# Patient Record
Sex: FEMALE | Race: WHITE | NOT HISPANIC OR LATINO
[De-identification: names, ages, dates, MRNs, and addresses within clinical notes are randomized per-mention and may not be internally consistent; named-entity substitution may affect disease eponyms.]

---

## 2021-11-01 ENCOUNTER — TRANSCRIPTION ENCOUNTER (OUTPATIENT)
Age: 31
End: 2021-11-01

## 2021-11-17 ENCOUNTER — APPOINTMENT (OUTPATIENT)
Dept: ULTRASOUND IMAGING | Facility: CLINIC | Age: 31
End: 2021-11-17
Payer: MEDICAID

## 2021-11-17 ENCOUNTER — OUTPATIENT (OUTPATIENT)
Dept: OUTPATIENT SERVICES | Facility: HOSPITAL | Age: 31
LOS: 1 days | End: 2021-11-17

## 2021-11-17 PROCEDURE — 76856 US EXAM PELVIC COMPLETE: CPT | Mod: 26

## 2021-11-17 PROCEDURE — 76770 US EXAM ABDO BACK WALL COMP: CPT | Mod: 26

## 2021-11-17 PROCEDURE — 76830 TRANSVAGINAL US NON-OB: CPT | Mod: 26

## 2021-12-02 ENCOUNTER — LABORATORY RESULT (OUTPATIENT)
Age: 31
End: 2021-12-02

## 2021-12-03 ENCOUNTER — APPOINTMENT (OUTPATIENT)
Dept: UROLOGY | Facility: CLINIC | Age: 31
End: 2021-12-03
Payer: MEDICAID

## 2021-12-03 VITALS
WEIGHT: 190 LBS | BODY MASS INDEX: 29.82 KG/M2 | HEIGHT: 67 IN | RESPIRATION RATE: 16 BRPM | OXYGEN SATURATION: 95 % | DIASTOLIC BLOOD PRESSURE: 84 MMHG | SYSTOLIC BLOOD PRESSURE: 120 MMHG | TEMPERATURE: 97.6 F | HEART RATE: 108 BPM

## 2021-12-03 DIAGNOSIS — G43.909 MIGRAINE, UNSPECIFIED, NOT INTRACTABLE, W/OUT STATUS MIGRAINOSUS: ICD-10-CM

## 2021-12-03 DIAGNOSIS — R39.9 UNSPECIFIED SYMPTOMS AND SIGNS INVOLVING THE GENITOURINARY SYSTEM: ICD-10-CM

## 2021-12-03 DIAGNOSIS — Z87.19 PERSONAL HISTORY OF OTHER DISEASES OF THE DIGESTIVE SYSTEM: ICD-10-CM

## 2021-12-03 DIAGNOSIS — Z85.9 PERSONAL HISTORY OF MALIGNANT NEOPLASM, UNSPECIFIED: ICD-10-CM

## 2021-12-03 DIAGNOSIS — Z78.9 OTHER SPECIFIED HEALTH STATUS: ICD-10-CM

## 2021-12-03 DIAGNOSIS — Z84.1 FAMILY HISTORY OF DISORDERS OF KIDNEY AND URETER: ICD-10-CM

## 2021-12-03 PROCEDURE — 51798 US URINE CAPACITY MEASURE: CPT

## 2021-12-03 PROCEDURE — 99204 OFFICE O/P NEW MOD 45 MIN: CPT | Mod: 25

## 2021-12-03 RX ORDER — ESCITALOPRAM OXALATE 5 MG/1
TABLET, FILM COATED ORAL
Refills: 0 | Status: ACTIVE | COMMUNITY

## 2021-12-03 NOTE — LETTER BODY
[Dear  ___] : Dear  [unfilled], [Consult Letter:] : I had the pleasure of evaluating your patient, [unfilled]. [Please see my note below.] : Please see my note below. [Consult Closing:] : Thank you very much for allowing me to participate in the care of this patient.  If you have any questions, please do not hesitate to contact me. [Sincerely,] : Sincerely, [FreeTextEntry3] : Nallely Peck MD

## 2021-12-03 NOTE — HISTORY OF PRESENT ILLNESS
[FreeTextEntry1] : 31 year old with history of liver sarcoma s/p resection and chemotherapy, Crohn's disease, presents with 1-2 months of LUTS.\par \par Symptoms first began as urinary frequency and urgency. She noticed that her stream was slightly weaker at times, which may have occurred when urine volumes were lower. With larger urine volumes, stream was normal. About one month ago, she developed RLQ pressure with bladder filling that temporarily relieved with urination. She went to ED, where she received macrobid. Her symptoms improved mildly at that time. Since then, the frequency, urgency and RLQ pressure have gradually been improving, though they are still present. She had a normal renal/bladder ultrasound two weeks ago with no kidney stones or hydronephrosis. She had a pelvic ultrasound that identified a 2.5 cm right hemorrhagic ovarian cyst with no other abnormalities. No fevers, chills, dysuria, gross hematuria, flank pain. No prior kidney stones. She has had multiple negative urine cultures.\par \par PVR 9 cc

## 2021-12-03 NOTE — REVIEW OF SYSTEMS
[Feeling Tired] : feeling tired [Recent Weight Gain (___ Lbs)] : recent [unfilled] ~Ulb weight gain [Palpitations] : palpitations [Shortness Of Breath] : shortness of breath [Diarrhea] : diarrhea [Heartburn] : heartburn [Dizziness] : dizziness [Limb Weakness] : limb weakness [Anxiety] : anxiety [Feelings Of Weakness] : feelings of weakness [Easy Bruising] : a tendency for easy bruising [Negative] : Heme/Lymph

## 2021-12-03 NOTE — ASSESSMENT
[FreeTextEntry1] : 31 year old with history of liver sarcoma s/p resection and chemotherapy, Crohn's disease, presents with 1-2 months of LUTS. Symptoms primarily characterized by frequency, urgency, weak stream and incomplete emptying. She had a few weeks of RLQ pressure with full bladder that may have been caused by a right hemorrhagic ovarian cyst. The pressure has now resolved. Her post-void residual is low, so she is emptying her bladder. The weak stream seems to occur only when the voided volume is low, which can be normal, so my suspicion for detrusor underactivity or urethral obstruction is low. I suspect she does have increased frequency and urgency, leading to low voided volumes. Possible causes include extrinsic compression from the hemorrhagic cyst, overactive bladder, occult malignancy. Overall, symptoms are improving, which is reassuring. I will assess for occult malignancy with urinalysis. If there is blood in the urine, I will plan for further evaluation with cystoscopy. We discussed management options for overactive bladder. We discussed that symptoms of overactive bladder can resolve on their own or typically can be managed with behavioral modifications. If symptoms are persistent, there are medications that can help with overactive bladder symptoms.\par  - F/U UA, UCx, GC/CT\par  - Behavioral modifications: limit fluid intake prior to bedtime, limit caffeine and alcohol intake, elevate legs 2 hours prior to bedtime, double voiding to ensure complete emptying\par  - Anticholinergics if symptoms persist\par  - Possible uroflow if patient notices more consistent weak stream \par  - Possible cystoscopy if evidence of microhematuria

## 2021-12-10 ENCOUNTER — NON-APPOINTMENT (OUTPATIENT)
Age: 31
End: 2021-12-10

## 2022-03-10 NOTE — PHYSICAL EXAM
Speech Therapy    Patient not seen in therapy today.    Additional details:  Per chart review and discussion with nursing, patient was decannulated this AM is NPO for G-tube placement.  Therefore, skilled speech therapy will hold services this date and re-attempt when appropriate following G-tube insertion.                       Documented in the chart in the following areas: Assessment.       [General Appearance - Well Developed] : well developed [General Appearance - Well Nourished] : well nourished [Normal Appearance] : normal appearance [Well Groomed] : well groomed [General Appearance - In No Acute Distress] : no acute distress [Edema] : no peripheral edema [Respiration, Rhythm And Depth] : normal respiratory rhythm and effort [Exaggerated Use Of Accessory Muscles For Inspiration] : no accessory muscle use [Abdomen Soft] : soft [Abdomen Tenderness] : non-tender [Costovertebral Angle Tenderness] : no ~M costovertebral angle tenderness [Urinary Bladder Findings] : the bladder was normal on palpation [Normal Station and Gait] : the gait and station were normal for the patient's age [] : no rash [No Focal Deficits] : no focal deficits [Oriented To Time, Place, And Person] : oriented to person, place, and time [Affect] : the affect was normal [Mood] : the mood was normal [Not Anxious] : not anxious [FreeTextEntry1] : RUQ incision

## 2022-06-20 ENCOUNTER — APPOINTMENT (OUTPATIENT)
Dept: GASTROENTEROLOGY | Facility: CLINIC | Age: 32
End: 2022-06-20

## 2022-06-20 ENCOUNTER — TRANSCRIPTION ENCOUNTER (OUTPATIENT)
Age: 32
End: 2022-06-20

## 2022-06-20 ENCOUNTER — NON-APPOINTMENT (OUTPATIENT)
Age: 32
End: 2022-06-20

## 2022-06-20 VITALS
BODY MASS INDEX: 25.74 KG/M2 | HEART RATE: 102 BPM | SYSTOLIC BLOOD PRESSURE: 123 MMHG | TEMPERATURE: 97.3 F | RESPIRATION RATE: 16 BRPM | WEIGHT: 164 LBS | HEIGHT: 67 IN | OXYGEN SATURATION: 98 % | DIASTOLIC BLOOD PRESSURE: 70 MMHG

## 2022-06-20 DIAGNOSIS — K52.3 INDETERMINATE COLITIS: ICD-10-CM

## 2022-06-20 PROCEDURE — 99204 OFFICE O/P NEW MOD 45 MIN: CPT | Mod: 25

## 2022-06-20 PROCEDURE — 36415 COLL VENOUS BLD VENIPUNCTURE: CPT

## 2022-06-21 ENCOUNTER — TRANSCRIPTION ENCOUNTER (OUTPATIENT)
Age: 32
End: 2022-06-21

## 2022-06-23 LAB
G LAMBLIA AG STL QL: NORMAL
GI PCR PANEL, STOOL: NORMAL

## 2022-06-23 NOTE — ASSESSMENT
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 In the past has been on sulfasalazine, but no prednisone, biologic naive. Currently with flare symptoms (diarrhea with BMs 16-20x/day, rectal bleeding). 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)\par - continue budesonide, hydrocortisone suppostiories, and mesalamine for now\par - check stool studies including GI PCR and cdiff, if cdiff negative start prednisone \par - check fecal calprotectin\par - labwork including C-RP, pre-biologic work up, routine CBC, CMP, IBD panel, TPMT\par - will need to consider CTE/MRE for small bowel evlauation (per patient no prior small bowel dedicated imaging or vce done)\par - discussed that would consider started VDZ or UST (particiuarly in light of prior sarcoma, though it was long time ago)\par - We detailed increased risk of infections (bacterial, fungal, viral) which we will mitigate by immunizing in appropriate fashion (HBV,zoster, flu, pneumovax); risk of malignancy with time spent on lymphoma (HSTCL in young adults) and skin cancers (need for derm annual eval) ; risk of liver injury, bone marrow suppression, CNS disorders, allergic and infusion (if IV admin) reactions, idiosyncratic skin reactions, joint problems, among other rare and unusual manifestations. We discussed the need to notify if fevers or major illness prior to infusions, and the need to maintain follow up and obtain request labs and evaluations. In addition, we have already discusses resources such as [facility] and the manufacturers "patients" page to obtain additional detailed information on the medication.\par \par Follow up in 1 month

## 2022-06-23 NOTE — HISTORY OF PRESENT ILLNESS
[de-identified] : 31 yo F PMH liver cancer at age 16, history of intdeterminate colitis (initially diagnosed with CD and later was told diagnosis was UC; previously on sulfasalazine for many years, recently with flare and started on budesonide with minimal response; now on mesalamine and hydrocortisone suppositories with minimal improvement) who presents for evaluation of rectal bleeding, stool urgency and frequency, diarrhea (16-20 episodes daily). \par \par 10 yrs ago had bleeding, no urgency. Had a colonosocpy and said it was CD. \par For 10 yrs until this Feb was on sulfasalazine 800mg. (had 'chill crohns') and occasional bleeding and would doa  canasa suppository and would get better. \par \par in Feb started having blood with every BM. Was having urgency and still had bleeding every time. Had a colonoscopy on May 9th and told it was UC at [facility] with Dr. Geronimo Obrien (Dewards 2 disease activity). Was on budesonide and hydrocortisone enemas with no improvement for piror to colonoscopy. During the day bad, but could put it in at night and felt ok. Felt depleted and usually could still take subway and live normal life. After colonoscopy Switched her to mesalamine 0.375 2 pills twice a day, continued hydrocortisone enemas. But got to thep oint where put enema in and it wouldn't stay in. Stopped that but stayed on mesalamine. \par \par Urgency getting worse. For the past 2 week going 20x/day. Waking up at night. \par Has no energy. Lying down all day and is in the bathroom. Can't live normally. \par \par She has never been told she had a fistula or a stricture. \par Crohns diagnosed at [facility] with Dr. Ramires (was scattered inflammation throughout). He wrote down diagnosis of UC however. \par \par Since Feb has been having blood every time. \par Recently over the past 2 weeks having tons of BMs, even worse. \par \par Had a period waking up at night feeling feverish. Stopped getting that. Also having "sore eyes". No anterior uveitis, joint pains, rashes. \par \par Has never been on prednisone in the past. \par \par Started cholestyramine because of diarrhea. # Bms went up, did not help. \par \par no abdominal pain right now. Sometimes very uncomfortable feeling. \par Feels queasy often but never thrown up. \par Will oftenonly want to eat pretzels. \par \par Has lost 10-15 lbs since Feb 2022. the past few weeks weightloss accelerated. \par \par BMs are loose but brown. \par Sometimes a little mcuus, sometimes when she wipes sees mucus. \par \par \par PMH: \par liver sarcoma age 16 s/p chemotherapy w/ liver resection (no radiation) \par \par PSH: \par liver surgery \par \par FH: \par pancreatic cancer paternal grandmother and paternal great aunt\par No other IBD\par \par SH: \par never smoker\par social ETOH, not recent\par Works as an actor and manages puppet company\par \par MEDS:\par mesalamine\par lexapro 15mg \par citracal and vitamin D\par MVI \par klonipin 0.25mg every other day at night\par \par ALL: amoxicillin and penicillin hives\par

## 2022-06-24 ENCOUNTER — TRANSCRIPTION ENCOUNTER (OUTPATIENT)
Age: 32
End: 2022-06-24

## 2022-06-27 ENCOUNTER — NON-APPOINTMENT (OUTPATIENT)
Age: 32
End: 2022-06-27

## 2022-06-28 ENCOUNTER — TRANSCRIPTION ENCOUNTER (OUTPATIENT)
Age: 32
End: 2022-06-28

## 2022-07-05 ENCOUNTER — INPATIENT (INPATIENT)
Facility: HOSPITAL | Age: 32
LOS: 6 days | Discharge: HOME | DRG: 387 | End: 2022-07-12
Attending: INTERNAL MEDICINE | Admitting: STUDENT IN AN ORGANIZED HEALTH CARE EDUCATION/TRAINING PROGRAM
Payer: MEDICAID

## 2022-07-05 ENCOUNTER — TRANSCRIPTION ENCOUNTER (OUTPATIENT)
Age: 32
End: 2022-07-05

## 2022-07-05 VITALS
TEMPERATURE: 98 F | OXYGEN SATURATION: 98 % | RESPIRATION RATE: 16 BRPM | WEIGHT: 162.04 LBS | DIASTOLIC BLOOD PRESSURE: 77 MMHG | SYSTOLIC BLOOD PRESSURE: 116 MMHG | HEIGHT: 67 IN | HEART RATE: 100 BPM

## 2022-07-05 DIAGNOSIS — K51.90 ULCERATIVE COLITIS, UNSPECIFIED, WITHOUT COMPLICATIONS: ICD-10-CM

## 2022-07-05 DIAGNOSIS — F41.9 ANXIETY DISORDER, UNSPECIFIED: ICD-10-CM

## 2022-07-05 DIAGNOSIS — R63.8 OTHER SYMPTOMS AND SIGNS CONCERNING FOOD AND FLUID INTAKE: ICD-10-CM

## 2022-07-05 LAB
ALBUMIN SERPL ELPH-MCNC: 3.7 G/DL — SIGNIFICANT CHANGE UP (ref 3.3–5)
ALP SERPL-CCNC: 71 U/L — SIGNIFICANT CHANGE UP (ref 40–120)
ALT FLD-CCNC: 19 U/L — SIGNIFICANT CHANGE UP (ref 10–45)
ANION GAP SERPL CALC-SCNC: 11 MMOL/L — SIGNIFICANT CHANGE UP (ref 5–17)
APPEARANCE UR: ABNORMAL
AST SERPL-CCNC: 15 U/L — SIGNIFICANT CHANGE UP (ref 10–40)
BACTERIA # UR AUTO: PRESENT /HPF
BASOPHILS # BLD AUTO: 0.01 K/UL — SIGNIFICANT CHANGE UP (ref 0–0.2)
BASOPHILS NFR BLD AUTO: 0.1 % — SIGNIFICANT CHANGE UP (ref 0–2)
BILIRUB SERPL-MCNC: 0.2 MG/DL — SIGNIFICANT CHANGE UP (ref 0.2–1.2)
BILIRUB UR-MCNC: NEGATIVE — SIGNIFICANT CHANGE UP
BUN SERPL-MCNC: 20 MG/DL — SIGNIFICANT CHANGE UP (ref 7–23)
C DIFF GDH STL QL: NEGATIVE — SIGNIFICANT CHANGE UP
C DIFF GDH STL QL: SIGNIFICANT CHANGE UP
CALCIUM SERPL-MCNC: 9.1 MG/DL — SIGNIFICANT CHANGE UP (ref 8.4–10.5)
CHLORIDE SERPL-SCNC: 99 MMOL/L — SIGNIFICANT CHANGE UP (ref 96–108)
CO2 SERPL-SCNC: 28 MMOL/L — SIGNIFICANT CHANGE UP (ref 22–31)
COLOR SPEC: YELLOW — SIGNIFICANT CHANGE UP
COMMENT - URINE: SIGNIFICANT CHANGE UP
CREAT SERPL-MCNC: 0.73 MG/DL — SIGNIFICANT CHANGE UP (ref 0.5–1.3)
CRP SERPL-MCNC: 3.7 MG/L — SIGNIFICANT CHANGE UP (ref 0–4)
CULTURE RESULTS: SIGNIFICANT CHANGE UP
DIFF PNL FLD: ABNORMAL
EGFR: 112 ML/MIN/1.73M2 — SIGNIFICANT CHANGE UP
EOSINOPHIL # BLD AUTO: 0.08 K/UL — SIGNIFICANT CHANGE UP (ref 0–0.5)
EOSINOPHIL NFR BLD AUTO: 0.8 % — SIGNIFICANT CHANGE UP (ref 0–6)
EPI CELLS # UR: SIGNIFICANT CHANGE UP /HPF (ref 0–5)
GLUCOSE SERPL-MCNC: 173 MG/DL — HIGH (ref 70–99)
GLUCOSE UR QL: NEGATIVE — SIGNIFICANT CHANGE UP
HCT VFR BLD CALC: 40.9 % — SIGNIFICANT CHANGE UP (ref 34.5–45)
HGB BLD-MCNC: 13 G/DL — SIGNIFICANT CHANGE UP (ref 11.5–15.5)
IMM GRANULOCYTES NFR BLD AUTO: 1 % — SIGNIFICANT CHANGE UP (ref 0–1.5)
KETONES UR-MCNC: NEGATIVE — SIGNIFICANT CHANGE UP
LEUKOCYTE ESTERASE UR-ACNC: NEGATIVE — SIGNIFICANT CHANGE UP
LIDOCAIN IGE QN: 21 U/L — SIGNIFICANT CHANGE UP (ref 7–60)
LYMPHOCYTES # BLD AUTO: 0.9 K/UL — LOW (ref 1–3.3)
LYMPHOCYTES # BLD AUTO: 9.3 % — LOW (ref 13–44)
MCHC RBC-ENTMCNC: 25.8 PG — LOW (ref 27–34)
MCHC RBC-ENTMCNC: 31.8 GM/DL — LOW (ref 32–36)
MCV RBC AUTO: 81.3 FL — SIGNIFICANT CHANGE UP (ref 80–100)
MONOCYTES # BLD AUTO: 0.45 K/UL — SIGNIFICANT CHANGE UP (ref 0–0.9)
MONOCYTES NFR BLD AUTO: 4.6 % — SIGNIFICANT CHANGE UP (ref 2–14)
NEUTROPHILS # BLD AUTO: 8.14 K/UL — HIGH (ref 1.8–7.4)
NEUTROPHILS NFR BLD AUTO: 84.2 % — HIGH (ref 43–77)
NITRITE UR-MCNC: NEGATIVE — SIGNIFICANT CHANGE UP
NRBC # BLD: 0 /100 WBCS — SIGNIFICANT CHANGE UP (ref 0–0)
PH UR: 6 — SIGNIFICANT CHANGE UP (ref 5–8)
PLATELET # BLD AUTO: 510 K/UL — HIGH (ref 150–400)
POTASSIUM SERPL-MCNC: 3.8 MMOL/L — SIGNIFICANT CHANGE UP (ref 3.5–5.3)
POTASSIUM SERPL-SCNC: 3.8 MMOL/L — SIGNIFICANT CHANGE UP (ref 3.5–5.3)
PROT SERPL-MCNC: 6.9 G/DL — SIGNIFICANT CHANGE UP (ref 6–8.3)
PROT UR-MCNC: NEGATIVE MG/DL — SIGNIFICANT CHANGE UP
RBC # BLD: 5.03 M/UL — SIGNIFICANT CHANGE UP (ref 3.8–5.2)
RBC # FLD: 14.1 % — SIGNIFICANT CHANGE UP (ref 10.3–14.5)
RBC CASTS # UR COMP ASSIST: < 5 /HPF — SIGNIFICANT CHANGE UP
SARS-COV-2 RNA SPEC QL NAA+PROBE: NEGATIVE — SIGNIFICANT CHANGE UP
SODIUM SERPL-SCNC: 138 MMOL/L — SIGNIFICANT CHANGE UP (ref 135–145)
SP GR SPEC: 1.02 — SIGNIFICANT CHANGE UP (ref 1–1.03)
SPECIMEN SOURCE: SIGNIFICANT CHANGE UP
UROBILINOGEN FLD QL: 0.2 E.U./DL — SIGNIFICANT CHANGE UP
WBC # BLD: 9.68 K/UL — SIGNIFICANT CHANGE UP (ref 3.8–10.5)
WBC # FLD AUTO: 9.68 K/UL — SIGNIFICANT CHANGE UP (ref 3.8–10.5)
WBC UR QL: < 5 /HPF — SIGNIFICANT CHANGE UP

## 2022-07-05 PROCEDURE — 99233 SBSQ HOSP IP/OBS HIGH 50: CPT

## 2022-07-05 PROCEDURE — 99285 EMERGENCY DEPT VISIT HI MDM: CPT

## 2022-07-05 PROCEDURE — 99254 IP/OBS CNSLTJ NEW/EST MOD 60: CPT

## 2022-07-05 PROCEDURE — 99221 1ST HOSP IP/OBS SF/LOW 40: CPT | Mod: GC

## 2022-07-05 RX ORDER — SODIUM CHLORIDE 9 MG/ML
1000 INJECTION, SOLUTION INTRAVENOUS
Refills: 0 | Status: DISCONTINUED | OUTPATIENT
Start: 2022-07-05 | End: 2022-07-12

## 2022-07-05 RX ORDER — PANTOPRAZOLE SODIUM 20 MG/1
40 TABLET, DELAYED RELEASE ORAL
Refills: 0 | Status: DISCONTINUED | OUTPATIENT
Start: 2022-07-05 | End: 2022-07-12

## 2022-07-05 RX ORDER — DEXTROSE 50 % IN WATER 50 %
15 SYRINGE (ML) INTRAVENOUS ONCE
Refills: 0 | Status: DISCONTINUED | OUTPATIENT
Start: 2022-07-05 | End: 2022-07-12

## 2022-07-05 RX ORDER — INSULIN LISPRO 100/ML
VIAL (ML) SUBCUTANEOUS
Refills: 0 | Status: DISCONTINUED | OUTPATIENT
Start: 2022-07-05 | End: 2022-07-12

## 2022-07-05 RX ORDER — DEXTROSE 50 % IN WATER 50 %
25 SYRINGE (ML) INTRAVENOUS ONCE
Refills: 0 | Status: DISCONTINUED | OUTPATIENT
Start: 2022-07-05 | End: 2022-07-12

## 2022-07-05 RX ORDER — DEXTROSE 50 % IN WATER 50 %
12.5 SYRINGE (ML) INTRAVENOUS ONCE
Refills: 0 | Status: DISCONTINUED | OUTPATIENT
Start: 2022-07-05 | End: 2022-07-12

## 2022-07-05 RX ORDER — GLUCAGON INJECTION, SOLUTION 0.5 MG/.1ML
1 INJECTION, SOLUTION SUBCUTANEOUS ONCE
Refills: 0 | Status: DISCONTINUED | OUTPATIENT
Start: 2022-07-05 | End: 2022-07-12

## 2022-07-05 RX ORDER — ONDANSETRON 8 MG/1
4 TABLET, FILM COATED ORAL EVERY 8 HOURS
Refills: 0 | Status: DISCONTINUED | OUTPATIENT
Start: 2022-07-05 | End: 2022-07-12

## 2022-07-05 RX ORDER — LANOLIN ALCOHOL/MO/W.PET/CERES
3 CREAM (GRAM) TOPICAL AT BEDTIME
Refills: 0 | Status: DISCONTINUED | OUTPATIENT
Start: 2022-07-05 | End: 2022-07-12

## 2022-07-05 RX ORDER — ESCITALOPRAM OXALATE 10 MG/1
15 TABLET, FILM COATED ORAL DAILY
Refills: 0 | Status: DISCONTINUED | OUTPATIENT
Start: 2022-07-06 | End: 2022-07-12

## 2022-07-05 RX ORDER — CLONAZEPAM 1 MG
0.25 TABLET ORAL EVERY 24 HOURS
Refills: 0 | Status: DISCONTINUED | OUTPATIENT
Start: 2022-07-05 | End: 2022-07-11

## 2022-07-05 RX ORDER — ACETAMINOPHEN 500 MG
650 TABLET ORAL EVERY 6 HOURS
Refills: 0 | Status: DISCONTINUED | OUTPATIENT
Start: 2022-07-05 | End: 2022-07-12

## 2022-07-05 RX ADMIN — Medication 20 MILLIGRAM(S): at 19:35

## 2022-07-05 NOTE — ED PROVIDER NOTE - PROGRESS NOTE DETAILS
Pt seen in the ED by GI Dr Mcgee - admit to medicine. NPO after midnight. Pt for flex sig in the morning. No imaging at this time. Requesting GI PCR and stool for C Diff. Once stool sample has been obtained, can start Solumedrol 20 mg IV 8 hours. GI recommended CT a/p, however pt concerned about radiation risks. GI aware, will hold off on CT imaging at this time

## 2022-07-05 NOTE — H&P ADULT - HISTORY OF PRESENT ILLNESS
32 F with PMHx indeterminate colitis (diagnosed as Crohns in 2012 on colonoscopy, then more recently on 2022 colonoscopy with UC), sarcoma s/p resection at 15 y/o s/p chemo, presenting to ED c/o persistent bloody BMs (15-20 episodes/day) and urgency for several months, referred to the ED by GI Dr Roa. Pt reporting ~20 lb weight loss. Initial diagnosis of Crohn's disease made on colonoscopy in 2012, was on Sulfasalazine 800 mg daily x 10 years with no symptoms. However, starting in Feb 2022, noted to have frequent bloody BMs, refractory to budesonide, hydrocortisone enemas/mesalamine and more recently Prednisone 40 mg --> 60 mg daily w/o sig improvement.    In the ED:  Initial vital signs: T: XX F, HR: XX, BP: XX, R: XX, SpO2: XX% on RA  Labs: significant for  Imaging:  CXR:   EKG:   Medications:   Consults: none    32 F with PMHx indeterminate colitis (diagnosed as Crohns in 2012 on colonoscopy, then more recently on 2022 colonoscopy with UC), sarcoma s/p resection at 17 y/o s/p chemo, presenting to ED c/o persistent bloody BMs (15-20 episodes/day) and urgency for several months, referred to the ED by GI Dr Roa. Pt reporting ~20 lb weight loss. When she first developed a flare up of her symptoms in February, she was placed on Budesonide, with no response. Then tried Hydrocortisone enemas/suppositories shortly thereafter, still with no response. In May 2022, then went to see another [facility] physician. 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 Saw Dr Roa on 6/23, recommended continuation of home medications, fecal calprotectin 3449, GI PCR/Cdiff negative at the time; however given worsening symptoms, was started on Prednisone 40 mg daily starting 6/24, then increased dose to 60 mg daily on 6/28. Patient advised to proceed to the ED as patient not responding to higher dose of Prednisone and persistent bloody diarrhea (15-20 BMs/day).     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 Pt reporting ~20 lb weight loss since Feb. When she first developed a flare up of her symptoms in February, she was placed on Budesonide, with no response. Then tried Hydrocortisone enemas/suppositories shortly thereafter, still with no response. In May 2022, then went to see another [facility] physician. 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 Saw Dr Roa on 6/23, recommended continuation of home medications, fecal calprotectin 3449, GI PCR/Cdiff negative at the time; however given worsening symptoms, was started on Prednisone 40 mg daily starting 6/24, then increased dose to 60 mg daily on 6/28. Patient advised to proceed to the ED as patient not responding to higher dose of Prednisone and persistent bloody diarrhea (15-20 BMs/day).     ED:   Vitals: 98.3, 88, 109/62, 16, 99% RA   Labs: , increased % neutrophils with normal total wbc, ESR/CRP wnl, UA wnl  Interventions: 20mg methylprednisolone, 4mg zofran

## 2022-07-05 NOTE — H&P ADULT - PROBLEM SELECTOR PLAN 2
Well controlled, not currently anxious.   - c/w home escitalopram 10mg daily   - c/w home buproprion 75mg daily   - patient takes 0.5mg clonazepam prn, can administer if the patient becomes acutely anxious Well controlled, not currently anxious.   - c/w home escitalopram 10mg daily   - c/w home 0.25mg clonazepam nightly Well controlled, not currently anxious.   - c/w home escitalopram 15mg daily   - c/w home 0.25mg clonazepam nightly

## 2022-07-05 NOTE — H&P ADULT - NSHPLABSRESULTS_GEN_ALL_CORE
LABS:                        13.0   9.68  )-----------( 510      ( 05 Jul 2022 17:25 )             40.9     07-05    138  |  99  |  20  ----------------------------<  173<H>  3.8   |  28  |  0.73    Ca    9.1      05 Jul 2022 17:25    TPro  6.9  /  Alb  3.7  /  TBili  0.2  /  DBili  x   /  AST  15  /  ALT  19  /  AlkPhos  71  07-05      Fingerstick  glucose:       RADIOLOGY & ADDITIONAL TESTS: Reviewed.

## 2022-07-05 NOTE — ED ADULT NURSE NOTE - OBJECTIVE STATEMENT
33 y/o female w/ hx of ulcerative colitis and sarcoma s/p resection at 17 y/o w/ chemo presents to the ED c/o generalized abdominal pain associated w/ bloody bowel movements for several months, worsening over the past couple of days. Denies fever, chills, CP, SOB, nausea, vomiting, urinary sx.

## 2022-07-05 NOTE — H&P ADULT - NSHPPHYSICALEXAM_GEN_ALL_CORE
VITAL SIGNS:  T(C): 36.8 (07-05-22 @ 19:26), Max: 36.8 (07-05-22 @ 19:26)  T(F): 98.3 (07-05-22 @ 19:26), Max: 98.3 (07-05-22 @ 19:26)  HR: 88 (07-05-22 @ 19:26) (88 - 100)  BP: 109/62 (07-05-22 @ 19:26) (109/62 - 116/77)  BP(mean): --  RR: 16 (07-05-22 @ 19:26) (16 - 16)  SpO2: 99% (07-05-22 @ 19:26) (98% - 99%)  Wt(kg): --    PHYSICAL EXAM:  Constitutional: WDWN resting comfortably in bed; NAD  Head: NC/AT  Eyes: PERRL, EOMI, anicteric sclera  ENT: no nasal discharge; uvula midline, no oropharyngeal erythema or exudates; MMM  Neck: supple; no JVD or thyromegaly  Respiratory: CTA B/L; no W/R/R, no retractions  Cardiac: +S1/S2; RRR; no M/R/G; PMI non-displaced  Gastrointestinal: abdomen soft, NT/ND; no rebound or guarding  Back: spine midline, no bony tenderness or step-offs; no CVAT B/L  Extremities: WWP, no clubbing or cyanosis; no peripheral edema  Musculoskeletal: NROM x4; no joint swelling, tenderness or erythema  Vascular: 2+ radial, DP pulses B/L  Dermatologic: skin warm, dry and intact; no rashes, wounds, or scars  Neurologic: AAOx3; CNII-XII grossly intact; no focal deficits  Psychiatric: affect and characteristics of appearance, verbalizations, behaviors are appropriate VITAL SIGNS:  T(C): 36.8 (07-05-22 @ 19:26), Max: 36.8 (07-05-22 @ 19:26)  T(F): 98.3 (07-05-22 @ 19:26), Max: 98.3 (07-05-22 @ 19:26)  HR: 88 (07-05-22 @ 19:26) (88 - 100)  BP: 109/62 (07-05-22 @ 19:26) (109/62 - 116/77)  BP(mean): --  RR: 16 (07-05-22 @ 19:26) (16 - 16)  SpO2: 99% (07-05-22 @ 19:26) (98% - 99%)  Wt(kg): --    PHYSICAL EXAM:  Constitutional:  NAD  Head: NC/AT  Eyes: PERRL, EOMI, anicteric sclera  ENT: no nasal discharge; uvula midline, no oropharyngeal erythema or exudates; MMM  Respiratory: CTA B/L; no W/R/R, no retractions  Cardiac: +S1/S2; RRR; no M/R/G  Gastrointestinal: abdomen soft, NT/ND; no rebound or guarding    Extremities: WWP, no clubbing or cyanosis; no peripheral edema    Musculoskeletal: NROM x4; no joint swelling, tenderness or erythema   Vascular: 2+ radial, DP pulses B/L    Dermatologic: skin warm, dry and intact; no rashes, wounds, or scars   Neurologic: AAOx3; CNII-XII grossly intact; no focal deficits   Psychiatric: affect and characteristics of appearance, verbalizations, behaviors are appropriate

## 2022-07-05 NOTE — H&P ADULT - ATTENDING COMMENTS
#UC flare: c/w solumedrol per GI. Cdiff neg, GI pcr pending. Abd soft/non tender. NPO after MN for tentative plan for flexible sigmoidoscopy tomorrow.

## 2022-07-05 NOTE — CONSULT NOTE ADULT - ASSESSMENT
32F PMHx sarcoma s/p resection, hx indeterminate colitis (diagnosed as Crohns in 2012 on colonoscopy, then more recently on 2022 colonoscopy with UC) presenting to ED with complaints of persistent bloody BMs, urgency for several months, refractory to PO steroids as an outpatient,  GI consulted for flare of indeterminate colitis.     #Indeterminate Colitis Flare  Patient presenting with several month history of bloody BMs, high frequency (15-20 episodes/day), with urgency, ~20 lb weight loss. With initial diagnosis of Crohn's disease on colonoscopy in 2012, was on Sulfasalazine 800 mg daily x 10 years with no symptoms, then starting in Feb 2022, noted to have frequent bloody BMs, refractory to budesonide, hydrocortisone enemas/mesalamine and more recently Prednisone 40 mg --> 60 mg daily.  Most recent colonoscopy in may 2022 suggestive of UC, noted to have ochoa 2 disease. Fecal calprotectin 3449 (6/21/22), Cdiff/GI PCR negative as an outpatient. Unclear etiology for current flare up of symptoms.   -Recommend repeat GI PCR & Cdiff   -Start SoluMedrol 20 mg IVP q8h   -NPO after MN, except for meds with sips of water with tentative plan for flexible sigmoidoscopy tomorrow  -Obtain AM coags (7/6)  -2 tap water enemas in AM on 7/6  -Obtain CT A/P with IV/PO contrast to further evaluate extent of inflammation    Case discussed with c attending and primary team.     Amber Kaur DO  Gastroenterology Fellow  Pager: [phone]

## 2022-07-05 NOTE — ED PROVIDER NOTE - CLINICAL SUMMARY MEDICAL DECISION MAKING FREE TEXT BOX
Pt referred to the ED by GI for admission for colitis flare, further evaluation. Stool studies, NPO after mn, admit to medicine

## 2022-07-05 NOTE — ED PROVIDER NOTE - OBJECTIVE STATEMENT
32F PMHx sarcoma s/p resection at 15 y/o s/p chemo, hx indeterminate colitis (diagnosed as Crohns in 2012 on colonoscopy, then more recently on 2022 colonoscopy with UC) presenting to ED with complaints of persistent bloody BMs, urgency for several months, referred to the ED by GI Dr Roa.  Patient presenting with several month history of bloody BMs, high frequency (15-20 episodes/day), with urgency, ~20 lb weight loss. With initial diagnosis of Crohn's disease on colonoscopy in 2012, was on Sulfasalazine 800 mg daily x 10 years with no symptoms, then starting in Feb 2022, noted to have frequent bloody BMs, refractory to budesonide, hydrocortisone enemas/mesalamine and more recently Prednisone 40 mg --> 60 mg daily w/o sig improvement.  She reports stool soft to watery today w/ minimal blood. No f/c. No abd pain.

## 2022-07-05 NOTE — CONSULT NOTE ADULT - ATTENDING COMMENTS
31 yo F w/ PMHx sarcoma s/p resection and hx of indeterminate colitis, diagnosed as Crohns in 2012, then changed to UC on recent colonoscopy '22 p/w bloody BMs, urgency for several months, refractory to PO steroids as an outpatient consistent with flare of indeterminate colitis. Start solumedrol 20 mg q8h and stool studies.  Plan for flex sig tomorrow

## 2022-07-05 NOTE — ED ADULT NURSE NOTE - NS ED NURSE LEVEL OF CONSCIOUSNESS SPEECH
No Size Of Lesion In Cm: 0 Render Path Notes In Note?: No Was A Bandage Applied: Yes Post-Care Instructions: I reviewed with the patient in detail post-care instructions. Patient is to keep the biopsy site dry overnight, and then apply vaseline twice daily until healed. Electrodesiccation Text: The wound bed was treated with electrodesiccation after the biopsy was performed. Electrodesiccation And Curettage Text: The wound bed was treated with electrodesiccation and curettage after the biopsy was performed. Curettage Text: The wound bed was treated with curettage after the biopsy was performed. Consent: Verbal consent was obtained and risks were reviewed including but not limited to scarring, infection, bleeding, scabbing, incomplete removal, nerve damage and allergy to anesthesia. Biopsy Type: H and E Hemostasis: Electrocautery Anesthesia Volume In Cc: 1.2 Silver Nitrate Text: The wound bed was treated with silver nitrate after the biopsy was performed. Wound Care: Petrolatum Cryotherapy Text: The wound bed was treated with cryotherapy after the biopsy was performed. Anesthesia Type: 1% lidocaine with epinephrine Detail Level: Detailed Biopsy Method: Personna blade Type Of Destruction Used: Curettage Depth Of Biopsy: dermis Dressing: bandage Notification Instructions: Patient will be notified of biopsy results. However, patient instructed to call the office if not contacted within 2 weeks. Billing Type: Third-Party Bill Speaking Coherently

## 2022-07-05 NOTE — ED PROVIDER NOTE - PHYSICAL EXAMINATION
Constitutional: Well appearing, awake, alert, oriented to person, place, time/situation and in no apparent distress.  ENMT: Airway patent. Normal MM  Eyes: Clear bilaterally. no conjunctival pallor  Cardiac: Normal rate, regular rhythm.  Heart sounds S1, S2.  No murmurs, rubs or gallops.  Respiratory: Breaths sounds equal and clear b/l. No increased WOB, tachypnea, hypoxia, or accessory mm use. Pt speaks in full sentences.   Gastrointestinal: Abd soft, NT, ND, NABS. No guarding, rebound, or rigidity. No pulsatile abdominal masses. No organomegaly appreciated.   Musculoskeletal: Range of motion is not limited  Neuro: Alert and oriented x 3, face symmetric and speech fluent. Strength 5/5 x 4 ext and symmetric, nml gross motor movement, nml gait. No focal deficits noted.  Skin: Skin normal color for race, warm, dry and intact. No evidence of rash.  Psych: Alert and oriented to person, place, time/situation. normal mood and affect. no apparent risk to self or others.

## 2022-07-05 NOTE — CONSULT NOTE ADULT - SUBJECTIVE AND OBJECTIVE BOX
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 Notes a flare up of her symptoms starting in February 2022. At the time, had been on Sulfasalazine for almost 10 years with no problems. When she first developed a flare up of her symptoms in February, she was placed on Budesonide, with no response. Then tried Hydrocortisone enemas/suppositories shortly thereafter, still with no response. In May 2022, then went to see another [facility] physician. 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 Saw Dr Roa on 6/23, recommended continuation of home medications, fecal calprotectin [id number], GI PCR/Cdiff negative at the time; however given worsening symptoms, was started on Prednisone 40 mg daily starting 6/24, then increased dose to 60 mg daily on 6/28. Patient advised to proceed to the ED as patient not responding to higher dose of Prednisone and persistent bloody diarrhea (15-20 BMs/day).     ROS negative for rash, oral sores, arthralgias.      GI consulted for indeterminate colitis flare.     Allergies    penicillins (Hives)    Intolerances      MEDICATIONS:  MEDICATIONS  (STANDING):    MEDICATIONS  (PRN):    PAST MEDICAL & SURGICAL HISTORY:    FAMILY HISTORY:    SOCIAL HISTORY:  [place]: [ ] Current, [ ] Former, [ ] Never; Pack Years:  Alcohol:  Illicit Drugs:    REVIEW OF SYSTEMS:  Constitutional: No fever, chills, weight loss, or fatigue  ENMT:  No visual changes; No difficulty hearing, tinnitus, vertigo; No sinus or throat pain  Neck: No pain or stiffness  Respiratory: No cough, wheezing, or hemoptysis; No shortness of breath  Cardiovascular: No chest pain, palpitations, dizziness, orthopnea, PND, or leg swelling  Gastrointestinal: No abdominal or epigastric pain. No  nausea, vomiting, or hematemesis. No diarrhea, constipation, or steatorrhea. No melena or hematochezia  Genitourinary: No dysuria, urinary frequency/hesitancy, or hematuria  Skin: No itching, burning, rashes or lesions   Musculoskeletal: No joint pain or swelling; No muscle, back or extremity pain    Vital Signs Last 24 Hrs  T(C): 36.7 (05 Jul 2022 16:28), Max: 36.7 (05 Jul 2022 16:28)  T(F): 98 (05 Jul 2022 16:28), Max: 98 (05 Jul 2022 16:28)  HR: 100 (05 Jul 2022 16:28) (100 - 100)  BP: 116/77 (05 Jul 2022 16:28) (116/77 - 116/77)  BP(mean): --  RR: 16 (05 Jul 2022 16:28) (16 - 16)  SpO2: 98% (05 Jul 2022 16:28) (98% - 98%)      PHYSICAL EXAM:    General: female, sitting in a chair; in no acute distress; mother at bedside  HEENT: MMM, conjunctiva and sclera clear  Gastrointestinal: Soft, non-tender non-distended; Normal bowel sounds; No rebound or guarding; 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 No obvious rash    LABS:              RADIOLOGY & ADDITIONAL STUDIES:

## 2022-07-05 NOTE — H&P ADULT - PROBLEM SELECTOR PLAN 3
F: Tolerating PO   E: replete prn   N: NPO for flex-sig   DVT: SCD's   Dispo: F F: Tolerating PO   E: replete prn   N: NPO for flex-sig   DVT: SCD's   GI: PPI   Dispo: [facility]

## 2022-07-05 NOTE — H&P ADULT - ASSESSMENT
32 yo F w PMHx of indeterminate colitis and sarcoma*** (which type), p/w persistent bloody BMs with increased urgency and frequency, admitted for *** 32 yo F w PMHx of UC, p/w persistent bloody BMs with increased urgency and frequency, admitted for UC flair.

## 2022-07-05 NOTE — H&P ADULT - PROBLEM SELECTOR PLAN 1
Hx of UC with last colonoscopy 2022. Previously, condition was well controlled with Sulfasalazine 800 mg daily x 10 years. However, of recent, pts condition refractory to budesonide, hydrocortisone enemas/mesalamine and more recently prednisone 60 mg. Hx of UC with last colonoscopy 2022. Previously, condition was well controlled with Sulfasalazine 800 mg daily x 10 years. However, of recent, pts condition refractory to budesonide, hydrocortisone enemas/mesalamine and more recently prednisone 60 mg.  - GI following   - GI PCR & Cdiff   - c/w SoluMedrol 20 mg IVP q8h   - NPO after MN, except for meds with sips of water with tentative plan for flexible sigmoidoscopy tomorrow Hx of UC with last colonoscopy 2022. Previously, condition was well controlled with Sulfasalazine 800 mg daily x 10 years. However, of recent, pts condition refractory to budesonide, hydrocortisone enemas/mesalamine and more recently prednisone 60 mg.  - OK to hold mesalamine 2 pills of .375mg BID while inpatient   - GI following   - GI PCR & Cdiff   - c/w SoluMedrol 20 mg IVP q8h   - NPO after MN, except for meds with sips of water with tentative plan for flexible sigmoidoscopy tomorrow Hx of UC with last colonoscopy 2022. Previously, condition was well controlled with Sulfasalazine 800 mg daily x 10 years. However, of recent, pts condition refractory to budesonide, hydrocortisone enemas/mesalamine and more recently prednisone 60 mg.  - OK to hold mesalamine 2 pills of .375mg BID while inpatient   - GI following   - GI PCR & Cdiff   - c/w SoluMedrol 20 mg IVP q8h (sliding scale while on steroids)   - NPO after MN, except for meds with sips of water with tentative plan for flexible sigmoidoscopy tomorrow

## 2022-07-06 ENCOUNTER — TRANSCRIPTION ENCOUNTER (OUTPATIENT)
Age: 32
End: 2022-07-06

## 2022-07-06 ENCOUNTER — RESULT REVIEW (OUTPATIENT)
Age: 32
End: 2022-07-06

## 2022-07-06 LAB
A1C WITH ESTIMATED AVERAGE GLUCOSE RESULT: 5.5 % — SIGNIFICANT CHANGE UP (ref 4–5.6)
ALBUMIN SERPL ELPH-MCNC: 3.3 G/DL — SIGNIFICANT CHANGE UP (ref 3.3–5)
ALP SERPL-CCNC: 57 U/L — SIGNIFICANT CHANGE UP (ref 40–120)
ALT FLD-CCNC: 15 U/L — SIGNIFICANT CHANGE UP (ref 10–45)
ANION GAP SERPL CALC-SCNC: 11 MMOL/L — SIGNIFICANT CHANGE UP (ref 5–17)
APTT BLD: 27.4 SEC — LOW (ref 27.5–35.5)
AST SERPL-CCNC: 13 U/L — SIGNIFICANT CHANGE UP (ref 10–40)
BASOPHILS # BLD AUTO: 0.02 K/UL — SIGNIFICANT CHANGE UP (ref 0–0.2)
BASOPHILS NFR BLD AUTO: 0.2 % — SIGNIFICANT CHANGE UP (ref 0–2)
BILIRUB SERPL-MCNC: 0.2 MG/DL — SIGNIFICANT CHANGE UP (ref 0.2–1.2)
BUN SERPL-MCNC: 13 MG/DL — SIGNIFICANT CHANGE UP (ref 7–23)
CALCIUM SERPL-MCNC: 9 MG/DL — SIGNIFICANT CHANGE UP (ref 8.4–10.5)
CHLORIDE SERPL-SCNC: 102 MMOL/L — SIGNIFICANT CHANGE UP (ref 96–108)
CO2 SERPL-SCNC: 26 MMOL/L — SIGNIFICANT CHANGE UP (ref 22–31)
CREAT SERPL-MCNC: 0.68 MG/DL — SIGNIFICANT CHANGE UP (ref 0.5–1.3)
EGFR: 119 ML/MIN/1.73M2 — SIGNIFICANT CHANGE UP
EOSINOPHIL # BLD AUTO: 0 K/UL — SIGNIFICANT CHANGE UP (ref 0–0.5)
EOSINOPHIL NFR BLD AUTO: 0 % — SIGNIFICANT CHANGE UP (ref 0–6)
ERYTHROCYTE [SEDIMENTATION RATE] IN BLOOD: 12 MM/HR — SIGNIFICANT CHANGE UP
ESTIMATED AVERAGE GLUCOSE: 111 MG/DL — SIGNIFICANT CHANGE UP (ref 68–114)
GLUCOSE BLDC GLUCOMTR-MCNC: 128 MG/DL — HIGH (ref 70–99)
GLUCOSE BLDC GLUCOMTR-MCNC: 139 MG/DL — HIGH (ref 70–99)
GLUCOSE BLDC GLUCOMTR-MCNC: 147 MG/DL — HIGH (ref 70–99)
GLUCOSE BLDC GLUCOMTR-MCNC: 191 MG/DL — HIGH (ref 70–99)
GLUCOSE SERPL-MCNC: 139 MG/DL — HIGH (ref 70–99)
HCG UR QL: NEGATIVE — SIGNIFICANT CHANGE UP
HCT VFR BLD CALC: 35.8 % — SIGNIFICANT CHANGE UP (ref 34.5–45)
HGB BLD-MCNC: 11.6 G/DL — SIGNIFICANT CHANGE UP (ref 11.5–15.5)
IMM GRANULOCYTES NFR BLD AUTO: 1.4 % — SIGNIFICANT CHANGE UP (ref 0–1.5)
INR BLD: 1.07 — SIGNIFICANT CHANGE UP (ref 0.88–1.16)
LYMPHOCYTES # BLD AUTO: 0.82 K/UL — LOW (ref 1–3.3)
LYMPHOCYTES # BLD AUTO: 9.5 % — LOW (ref 13–44)
MCHC RBC-ENTMCNC: 26.2 PG — LOW (ref 27–34)
MCHC RBC-ENTMCNC: 32.4 GM/DL — SIGNIFICANT CHANGE UP (ref 32–36)
MCV RBC AUTO: 81 FL — SIGNIFICANT CHANGE UP (ref 80–100)
MONOCYTES # BLD AUTO: 0.48 K/UL — SIGNIFICANT CHANGE UP (ref 0–0.9)
MONOCYTES NFR BLD AUTO: 5.6 % — SIGNIFICANT CHANGE UP (ref 2–14)
NEUTROPHILS # BLD AUTO: 7.16 K/UL — SIGNIFICANT CHANGE UP (ref 1.8–7.4)
NEUTROPHILS NFR BLD AUTO: 83.3 % — HIGH (ref 43–77)
NRBC # BLD: 0 /100 WBCS — SIGNIFICANT CHANGE UP (ref 0–0)
PLATELET # BLD AUTO: 418 K/UL — HIGH (ref 150–400)
POTASSIUM SERPL-MCNC: 4.2 MMOL/L — SIGNIFICANT CHANGE UP (ref 3.5–5.3)
POTASSIUM SERPL-SCNC: 4.2 MMOL/L — SIGNIFICANT CHANGE UP (ref 3.5–5.3)
PROT SERPL-MCNC: 6.2 G/DL — SIGNIFICANT CHANGE UP (ref 6–8.3)
PROTHROM AB SERPL-ACNC: 12.8 SEC — SIGNIFICANT CHANGE UP (ref 10.5–13.4)
RBC # BLD: 4.42 M/UL — SIGNIFICANT CHANGE UP (ref 3.8–5.2)
RBC # FLD: 14 % — SIGNIFICANT CHANGE UP (ref 10.3–14.5)
SODIUM SERPL-SCNC: 139 MMOL/L — SIGNIFICANT CHANGE UP (ref 135–145)
WBC # BLD: 8.6 K/UL — SIGNIFICANT CHANGE UP (ref 3.8–10.5)
WBC # FLD AUTO: 8.6 K/UL — SIGNIFICANT CHANGE UP (ref 3.8–10.5)

## 2022-07-06 PROCEDURE — 99222 1ST HOSP IP/OBS MODERATE 55: CPT | Mod: GC

## 2022-07-06 PROCEDURE — 88305 TISSUE EXAM BY PATHOLOGIST: CPT | Mod: 26

## 2022-07-06 PROCEDURE — 99232 SBSQ HOSP IP/OBS MODERATE 35: CPT | Mod: GC

## 2022-07-06 PROCEDURE — 45331 SIGMOIDOSCOPY AND BIOPSY: CPT

## 2022-07-06 RX ADMIN — Medication 0.25 MILLIGRAM(S): at 23:31

## 2022-07-06 RX ADMIN — Medication 20 MILLIGRAM(S): at 11:07

## 2022-07-06 RX ADMIN — ESCITALOPRAM OXALATE 15 MILLIGRAM(S): 10 TABLET, FILM COATED ORAL at 17:02

## 2022-07-06 RX ADMIN — Medication 20 MILLIGRAM(S): at 02:04

## 2022-07-06 RX ADMIN — PANTOPRAZOLE SODIUM 40 MILLIGRAM(S): 20 TABLET, DELAYED RELEASE ORAL at 06:07

## 2022-07-06 RX ADMIN — Medication 0.25 MILLIGRAM(S): at 00:27

## 2022-07-06 RX ADMIN — Medication 20 MILLIGRAM(S): at 19:20

## 2022-07-06 NOTE — PROGRESS NOTE ADULT - PROBLEM SELECTOR PLAN 3
F: Tolerating PO   E: replete prn   N: NPO for flex-sig   DVT: SCD's   GI: PPI   Dispo: [facility] F: Tolerating PO   E: replete prn   N: Regular diet (no dairy)   DVT: SCD's   GI: PPI   Dispo: F

## 2022-07-06 NOTE — DISCHARGE NOTE PROVIDER - HOSPITAL COURSE
30 yo F w PMHx of indeterminate Crohn's vs UC, p/w persistent bloody BMs with increased urgency and frequency of 5 months duration, admitted for ulcerative colitis flare and found to have EDWARDS 3 UC on flex sig.    #Exacerbation of ulcerative colitis without complication  Hx of UC with last colonoscopy 2022. Previously, condition was well controlled with Sulfasalazine 800 mg daily x 10 years. However, of recent, pts condition refractory to budesonide, hydrocortisone enemas/mesalamine and more recently prednisone 60 mg. 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 Pt asymptomatic and lizz regular diet   - GI followed with recommendation for ??????????  - Resume mesalamine 2 pills of .375mg BID while inpatient ?????????  - c/w SoluMedrol 20 mg IVP q8h. Taper ?????????  - Resume regular diet (no dairy)    #Anxiety  Well controlled, not currently anxious.   - c/w home escitalopram 15mg daily   - c/w home 0.25mg clonazepam nightly.       30 yo F w PMHx of indeterminate Crohn's vs UC, p/w persistent bloody BMs with increased urgency and frequency of 5 months duration, admitted for ulcerative colitis flare and found to have EDWARDS 3 UC on flex sig.    #Exacerbation of ulcerative colitis without complication  Hx of UC with last colonoscopy 2022. Previously, condition was well controlled with Sulfasalazine 800 mg daily x 10 years. However, of recent, pts condition refractory to budesonide, hydrocortisone enemas/mesalamine and more recently prednisone 60 mg. 7/6 Flex sig revealed continuous erythematous,edematous mucosa with exudates and sponatenous bleeding most prominent in the rectum, consistent with Edwards 3 UC.    - Held mesalamine 2 pills of .375mg BID while inpatient ?????????  - c/w SoluMedrol 20 mg IVP q8h. Taper ?????????  - Was NPO for flex sig and then resumed and tolerated regular diet (no dairy)  - Was started on DVT ppx     #Anxiety  Well controlled, not currently anxious.   - c/w home escitalopram 15mg daily   - c/w home 0.25mg clonazepam nightly.       32 yo F w PMHx of indeterminate Crohn's vs UC, p/w persistent 15-20 bloody BMs with increased urgency and frequency of 5 months duration, admitted for ulcerative colitis flare.    Problem List/Main Diagnoses:     #Exacerbation of ulcerative colitis without complication  Hx of UC with last colonoscopy 2022. Previously, condition was well controlled with Sulfasalazine 800 mg daily x 10 years., now refractory to budesonide, hydrocortisone enemas/mesalamine and now prednisone 60 mg. 7/6 Flex sig revealed continuous erythematous,edematous mucosa with exudates and sponatenous bleeding most prominent in the rectum, consistent with Edwards 3 UC.  Clinically improved on Solumedrol 20 mg Q8H for 48 hours followed by one day surveillance of Prednisone PO 60 mg.   -GI followed with recommendations below   - Prednisone taper as follows:  ?????????  - Continue normal diet   -Tentative plan for initiation of Vedolizumab on outpatient follow-up   -Follow-up with PCP within 2 wks  -Follow-up with GI within 2-4 weeks     #Anxiety  Well controlled, not currently anxious.   - c/w home escitalopram 15mg daily   - c/w home 0.25mg clonazepam nightly.    New medications/therapies: Prednisone taper ???????????  New lines/hardware: None  Labs to be followed outpatient: None  Exam to be followed outpatient: None    Discharge plan: discharge to home    PHYSICAL EXAM ON DISCHARGE:           32 yo F w PMHx of indeterminate Crohn's vs UC, p/w persistent 15-20 bloody BMs with increased urgency and frequency of 5 months duration, admitted for ulcerative colitis flare.    Problem List/Main Diagnoses:     #Exacerbation of ulcerative colitis without complication  Hx of UC with last colonoscopy 2022. Previously, condition was well controlled with Sulfasalazine 800 mg daily x 10 years., now refractory to budesonide, hydrocortisone enemas/mesalamine and now prednisone 60 mg. 7/6 Flex sig revealed continuous erythematous,edematous mucosa with exudates and sponatenous bleeding most prominent in the rectum, consistent with Edwards 3 UC.  Clinically improved on Solumedrol 20 mg Q8H for 48 hours followed by one day surveillance of Prednisone PO 60 mg.   -GI followed with recommendations below   - Prednisone taper as follows:  ?????????  - Continue normal diet   - Tentative plan for initiation of Vedolizumab on outpatient follow-up   - Follow-up with PCP within 2 wks  - Follow-up with GI within 2-4 weeks     #Anxiety  Well controlled, not currently anxious.   - c/w home escitalopram 15mg daily   - c/w home 0.25mg clonazepam nightly.    New medications/therapies: Prednisone taper ???????????  New lines/hardware: None  Labs to be followed outpatient: None  Exam to be followed outpatient: None    Discharge plan: discharge to home    PHYSICAL EXAM ON DISCHARGE:           32 yo F w PMHx of indeterminate Crohn's vs UC, p/w persistent 15-20 bloody BMs with increased urgency and frequency of 5 months duration, admitted for ulcerative colitis flare.    Problem List/Main Diagnoses:     #Exacerbation of ulcerative colitis without complication  Hx of UC with last colonoscopy 2022. Previously, condition was well controlled with Sulfasalazine 800 mg daily x 10 years., now refractory to budesonide, hydrocortisone enemas/mesalamine and now prednisone 60 mg. 7/6 Flex sig revealed continuous erythematous,edematous mucosa with exudates and sponatenous bleeding most prominent in the rectum, consistent with Edwards 3 UC.  Clinically improved on Solumedrol 20 mg Q8H   -GI followed with recommendations below   - Prednisone taper as follows:  ?????????  - Continue normal diet   - Tentative plan for initiation of Vedolizumab on outpatient follow-up   - Follow-up with PCP within 2 wks  - Follow-up with GI within 2-4 weeks     #Anxiety  Well controlled, not currently anxious.   - c/w home escitalopram 15mg daily   - c/w home 0.25mg clonazepam nightly.    New medications/therapies: Prednisone taper ???????????  New lines/hardware: None  Labs to be followed outpatient: None  Exam to be followed outpatient: None    Discharge plan: discharge to home    PHYSICAL EXAM ON DISCHARGE:           30 yo F w PMHx of indeterminate Crohn's vs UC, p/w persistent 15-20 bloody BMs with increased urgency and frequency of 5 months duration, admitted for ulcerative colitis flare.    Problem List/Main Diagnoses:     #Exacerbation of ulcerative colitis without complication  Hx of UC with last colonoscopy 2022. Previously, condition was well controlled with Sulfasalazine 800 mg daily x 10 years., now refractory to budesonide, hydrocortisone enemas/mesalamine and now prednisone 60 mg. 7/6 Flex sig revealed continuous erythematous,edematous mucosa with exudates and sponatenous bleeding most prominent in the rectum, consistent with Edwards 3 UC.  Clinically improved on Solumedrol 20 mg Q8H   -GI followed with recommendations below   - Prednisone taper as follows:  60 mg PO 7 days (7/11-7/17), 55 mg PO (7/18-7/24), 50 mg PO (7/25-7/31), 45 mg PO (8/1-8/7). Continue 5 mg taper every 7 days, unless flare up symptoms recur. In this case, please return to your previous dose of PO prednisone   - Continue normal diet (no dairy)   - Follow-up with PCP within 2 wks  - Follow-up with GI on 7/15 for outpatient follow up for potential Vedolizumab infusion     #Anxiety  Well controlled, not currently anxious.   - c/w home escitalopram 15mg daily   - c/w home 0.25mg clonazepam nightly.    New medications/therapies: 60 mg PO 7 days (7/11-7/17), 55 mg PO (7/18-7/24), 50 mg PO (7/25-7/31), 45 mg PO (8/1-8/7). Continue 5 mg taper every 7 days, unless flare up symptoms recur. In this case, please return to your previous dose of PO prednisone   New lines/hardware: None  Labs to be followed outpatient: None  Exam to be followed outpatient: None    Discharge plan: discharge to home    PHYSICAL EXAM ON DISCHARGE:    General: Lying comfortably in bed; NAD; speaking in full sentences  HEENT: NC/AT; EOMI; MMM  Neck: supple  Cardiac: RRR; +S1/S2  Pulm: CTA B/L; no W/R/R  GI: Abd soft, nondistended. Very mild tenderness to deep palpation to LLQ. No tenderness to palpation to other quadrants. Large healed abdominal scar from previous hepatic resection from sarcoma when 15 y/o.  Extremities: no edema, clubbing or cyanosis  Vasc: 2+ radial pulses B/L  Neuro: AAOx3; no focal deficits       30 yo F w PMHx of indeterminate Crohn's vs UC, p/w persistent 15-20 bloody BMs with increased urgency and frequency of 5 months duration, admitted for ulcerative colitis flare.    Problem List/Main Diagnoses:     #Exacerbation of ulcerative colitis without complication  Hx of UC with last colonoscopy 2022. Previously, condition was well controlled with Sulfasalazine 800 mg daily x 10 years., now refractory to budesonide, hydrocortisone enemas/mesalamine and now prednisone 60 mg. 7/6 Flex sig revealed continuous erythematous,edematous mucosa with exudates and sponatenous bleeding most prominent in the rectum, consistent with Edwards 3 UC.  Clinically improved on Solumedrol 20 mg Q8H   -GI followed with recommendations below   - Prednisone taper as follows:  60 mg PO 7 days (7/11-7/17), 55 mg PO (7/18-7/24), 50 mg PO (7/25-7/31), 45 mg PO (8/1-8/7). Continue 5 mg taper every 7 days, unless flare up symptoms recur. In this case, please return to your previous dose of PO prednisone   - Continue normal diet (no dairy)   - Follow-up with PCP within 2 wks  - Follow-up with GI on 7/15 for outpatient follow up for potential Vedolizumab infusion     #Anxiety  Well controlled, not currently anxious.   - c/w home escitalopram 15mg daily   - c/w home 0.25mg clonazepam nightly.    New medications/therapies: 60 mg PO 7 days (7/11-7/17), 55 mg PO (7/18-7/24), 50 mg PO (7/25-7/31), 45 mg PO (8/1-8/7). Continue 5 mg taper every 7 days, unless flare up symptoms recur. In this case, please return to your previous dose of PO prednisone   New lines/hardware: None  Labs to be followed outpatient: None  Exam to be followed outpatient: None    Discharge plan: discharge to home    PHYSICAL EXAM ON DISCHARGE:    General: Lying comfortably in bed; NAD; speaking in full sentences  HEENT: NC/AT; EOMI; MMM  Neck: supple  Cardiac: RRR; +S1/S2  Pulm: CTA B/L; no W/R/R  GI: Abd soft, nondistended. Very mild tenderness to deep palpation to LLQ. No tenderness to palpation to other quadrants. 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  Extremities: no edema, clubbing or cyanosis  Vasc: 2+ radial pulses B/L  Neuro: AAOx3; no focal deficits

## 2022-07-06 NOTE — DISCHARGE NOTE PROVIDER - PROVIDER TOKENS
PROVIDER:[TOKEN:[[id number]]] PROVIDER:[TOKEN:[[id number]]],PROVIDER:[TOKEN:[[id number]]] PROVIDER:[TOKEN:[[id number]]],PROVIDER:[TOKEN:[[id number]],SCHEDULEDAPPT:[07/15/2022]] PROVIDER:[TOKEN:[[id number]],SCHEDULEDAPPT:[07/18/2022],SCHEDULEDAPPTTIME:[02:00 PM]],PROVIDER:[TOKEN:[[id number]],SCHEDULEDAPPT:[07/15/2022]]

## 2022-07-06 NOTE — PROGRESS NOTE ADULT - SUBJECTIVE AND OBJECTIVE BOX
** INCOMPLETE **    OVERNIGHT EVENTS:     SUBJECTIVE:    VITAL SIGNS:  Vital Signs Last 24 Hrs  T(C): 36.6 (06 Jul 2022 05:47), Max: 36.9 (05 Jul 2022 22:20)  T(F): 97.8 (06 Jul 2022 05:47), Max: 98.4 (05 Jul 2022 22:20)  HR: 85 (06 Jul 2022 05:47) (74 - 100)  BP: 102/64 (06 Jul 2022 05:47) (102/64 - 116/77)  BP(mean): --  RR: 18 (06 Jul 2022 05:47) (16 - 19)  SpO2: 96% (06 Jul 2022 05:47) (95% - 99%)    PHYSICAL EXAM:  General: NAD; speaking in full sentences  HEENT: NC/AT; PERRL; EOMI; MMM  Neck: supple; no JVD  Cardiac: RRR; +S1/S2  Pulm: CTA B/L; no W/R/R  GI: soft, NT/ND, +BS  Extremities: WWP; no edema, clubbing or cyanosis  Vasc: 2+ radial, DP pulses B/L  Neuro: AAOx3; no focal deficits    MEDICATIONS:  MEDICATIONS  (STANDING):  clonazePAM  Tablet 0.25 milliGRAM(s) Oral every 24 hours  dextrose 5%. 1000 milliLiter(s) (100 mL/Hr) IV Continuous <Continuous>  dextrose 5%. 1000 milliLiter(s) (50 mL/Hr) IV Continuous <Continuous>  dextrose 50% Injectable 25 Gram(s) IV Push once  dextrose 50% Injectable 12.5 Gram(s) IV Push once  dextrose 50% Injectable 25 Gram(s) IV Push once  escitalopram 15 milliGRAM(s) Oral daily  glucagon  Injectable 1 milliGRAM(s) IntraMuscular once  insulin lispro (ADMELOG) corrective regimen sliding scale   SubCutaneous three times a day before meals  methylPREDNISolone sodium succinate Injectable 20 milliGRAM(s) IV Push every 8 hours  pantoprazole    Tablet 40 milliGRAM(s) Oral before breakfast    MEDICATIONS  (PRN):  acetaminophen     Tablet .. 650 milliGRAM(s) Oral every 6 hours PRN Temp greater or equal to 38C (100.4F), Mild Pain (1 - 3)  aluminum hydroxide/magnesium hydroxide/simethicone Suspension 30 milliLiter(s) Oral every 4 hours PRN Dyspepsia  dextrose Oral Gel 15 Gram(s) Oral once PRN Blood Glucose LESS THAN 70 milliGRAM(s)/deciliter  melatonin 3 milliGRAM(s) Oral at bedtime PRN Insomnia  ondansetron Injectable 4 milliGRAM(s) IV Push every 8 hours PRN Nausea and/or Vomiting      ALLERGIES:  Allergies    penicillins (Hives)    Intolerances        LABS:                        13.0   9.68  )-----------( 510      ( 05 Jul 2022 17:25 )             40.9     07-05    138  |  99  |  20  ----------------------------<  173<H>  3.8   |  28  |  0.73    Ca    9.1      05 Jul 2022 17:25    TPro  6.9  /  Alb  3.7  /  TBili  0.2  /  DBili  x   /  AST  15  /  ALT  19  /  AlkPhos  71  07-05        RADIOLOGY & ADDITIONAL TESTS: Reviewed. OVERNIGHT EVENTS: No overnight events.    SUBJECTIVE: Reports feeling fine. No abdominal complaints. Mentions bowel movements have improved and only had 2 BM over night. Was able to sleep okay.    VITAL SIGNS:  Vital Signs Last 24 Hrs  T(C): 36.6 (06 Jul 2022 05:47), Max: 36.9 (05 Jul 2022 22:20)  T(F): 97.8 (06 Jul 2022 05:47), Max: 98.4 (05 Jul 2022 22:20)  HR: 85 (06 Jul 2022 05:47) (74 - 100)  BP: 102/64 (06 Jul 2022 05:47) (102/64 - 116/77)  BP(mean): --  RR: 18 (06 Jul 2022 05:47) (16 - 19)  SpO2: 96% (06 Jul 2022 05:47) (95% - 99%)    PHYSICAL EXAM:  General: NAD; speaking in full sentences  HEENT: NC/AT; PERRL; EOMI; MMM  Neck: supple; no JVD  Cardiac: RRR; +S1/S2  Pulm: CTA B/L; no W/R/R  GI: soft, NT/ND, +BS, slight tenderness to palpation in LLQ  Extremities: WWP; no edema, clubbing or cyanosis  Vasc: 2+ radial, DP pulses B/L  Neuro: AAOx3; no focal deficits    MEDICATIONS:  MEDICATIONS  (STANDING):  clonazePAM  Tablet 0.25 milliGRAM(s) Oral every 24 hours  dextrose 5%. 1000 milliLiter(s) (100 mL/Hr) IV Continuous <Continuous>  dextrose 5%. 1000 milliLiter(s) (50 mL/Hr) IV Continuous <Continuous>  dextrose 50% Injectable 25 Gram(s) IV Push once  dextrose 50% Injectable 12.5 Gram(s) IV Push once  dextrose 50% Injectable 25 Gram(s) IV Push once  escitalopram 15 milliGRAM(s) Oral daily  glucagon  Injectable 1 milliGRAM(s) IntraMuscular once  insulin lispro (ADMELOG) corrective regimen sliding scale   SubCutaneous three times a day before meals  methylPREDNISolone sodium succinate Injectable 20 milliGRAM(s) IV Push every 8 hours  pantoprazole    Tablet 40 milliGRAM(s) Oral before breakfast    MEDICATIONS  (PRN):  acetaminophen     Tablet .. 650 milliGRAM(s) Oral every 6 hours PRN Temp greater or equal to 38C (100.4F), Mild Pain (1 - 3)  aluminum hydroxide/magnesium hydroxide/simethicone Suspension 30 milliLiter(s) Oral every 4 hours PRN Dyspepsia  dextrose Oral Gel 15 Gram(s) Oral once PRN Blood Glucose LESS THAN 70 milliGRAM(s)/deciliter  melatonin 3 milliGRAM(s) Oral at bedtime PRN Insomnia  ondansetron Injectable 4 milliGRAM(s) IV Push every 8 hours PRN Nausea and/or Vomiting      ALLERGIES:  Allergies    penicillins (Hives)    Intolerances        LABS:                        13.0   9.68  )-----------( 510      ( 05 Jul 2022 17:25 )             40.9     07-05    138  |  99  |  20  ----------------------------<  173<H>  3.8   |  28  |  0.73    Ca    9.1      05 Jul 2022 17:25    TPro  6.9  /  Alb  3.7  /  TBili  0.2  /  DBili  x   /  AST  15  /  ALT  19  /  AlkPhos  71  07-05        RADIOLOGY & ADDITIONAL TESTS: Reviewed. OVERNIGHT EVENTS: No overnight events.    SUBJECTIVE: Reports feeling fine. No abdominal complaints. Mentions bowel movements have improved and only had 2 BM over night with small amount of blood mixed with stool. Was able to sleep okay. Denies proctitis, tenesmus, fecal urgency,     VITAL SIGNS:  Vital Signs Last 24 Hrs  T(C): 36.6 (06 Jul 2022 05:47), Max: 36.9 (05 Jul 2022 22:20)  T(F): 97.8 (06 Jul 2022 05:47), Max: 98.4 (05 Jul 2022 22:20)  HR: 85 (06 Jul 2022 05:47) (74 - 100)  BP: 102/64 (06 Jul 2022 05:47) (102/64 - 116/77)  BP(mean): --  RR: 18 (06 Jul 2022 05:47) (16 - 19)  SpO2: 96% (06 Jul 2022 05:47) (95% - 99%)    PHYSICAL EXAM:  General: NAD; speaking in full sentences  HEENT: NC/AT; PERRL; EOMI; MMM  Neck: supple; no JVD  Cardiac: RRR; +S1/S2  Pulm: CTA B/L; no W/R/R  GI: soft, NT/ND, +BS, slight tenderness to palpation in LLQ, upper abdominal scar present 2/2 previous surgery   Extremities: WWP; no edema, clubbing or cyanosis  Vasc: 2+ radial, DP pulses B/L  Neuro: AAOx3; no focal deficits    MEDICATIONS:  MEDICATIONS  (STANDING):  clonazePAM  Tablet 0.25 milliGRAM(s) Oral every 24 hours  dextrose 5%. 1000 milliLiter(s) (100 mL/Hr) IV Continuous <Continuous>  dextrose 5%. 1000 milliLiter(s) (50 mL/Hr) IV Continuous <Continuous>  dextrose 50% Injectable 25 Gram(s) IV Push once  dextrose 50% Injectable 12.5 Gram(s) IV Push once  dextrose 50% Injectable 25 Gram(s) IV Push once  escitalopram 15 milliGRAM(s) Oral daily  glucagon  Injectable 1 milliGRAM(s) IntraMuscular once  insulin lispro (ADMELOG) corrective regimen sliding scale   SubCutaneous three times a day before meals  methylPREDNISolone sodium succinate Injectable 20 milliGRAM(s) IV Push every 8 hours  pantoprazole    Tablet 40 milliGRAM(s) Oral before breakfast    MEDICATIONS  (PRN):  acetaminophen     Tablet .. 650 milliGRAM(s) Oral every 6 hours PRN Temp greater or equal to 38C (100.4F), Mild Pain (1 - 3)  aluminum hydroxide/magnesium hydroxide/simethicone Suspension 30 milliLiter(s) Oral every 4 hours PRN Dyspepsia  dextrose Oral Gel 15 Gram(s) Oral once PRN Blood Glucose LESS THAN 70 milliGRAM(s)/deciliter  melatonin 3 milliGRAM(s) Oral at bedtime PRN Insomnia  ondansetron Injectable 4 milliGRAM(s) IV Push every 8 hours PRN Nausea and/or Vomiting      ALLERGIES:  Allergies    penicillins (Hives)    Intolerances        LABS:                        13.0   9.68  )-----------( 510      ( 05 Jul 2022 17:25 )             40.9     07-05    138  |  99  |  20  ----------------------------<  173<H>  3.8   |  28  |  0.73    Ca    9.1      05 Jul 2022 17:25    TPro  6.9  /  Alb  3.7  /  TBili  0.2  /  DBili  x   /  AST  15  /  ALT  19  /  AlkPhos  71  07-05        RADIOLOGY & ADDITIONAL TESTS: Reviewed. OVERNIGHT EVENTS: No overnight events.    SUBJECTIVE: Reports feeling fine. No abdominal complaints. Mentions bowel movements have improved and only had 2 BM over night with small amount of blood mixed with stool. Was able to sleep okay. Denies proctitis, tenesmus, fecal urgency, N/V, fever/chills.     VITAL SIGNS:  Vital Signs Last 24 Hrs  T(C): 36.6 (06 Jul 2022 05:47), Max: 36.9 (05 Jul 2022 22:20)  T(F): 97.8 (06 Jul 2022 05:47), Max: 98.4 (05 Jul 2022 22:20)  HR: 85 (06 Jul 2022 05:47) (74 - 100)  BP: 102/64 (06 Jul 2022 05:47) (102/64 - 116/77)  BP(mean): --  RR: 18 (06 Jul 2022 05:47) (16 - 19)  SpO2: 96% (06 Jul 2022 05:47) (95% - 99%)    PHYSICAL EXAM:  General: NAD; speaking in full sentences  HEENT: NC/AT; PERRL; EOMI; MMM  Neck: supple; no JVD  Cardiac: RRR; +S1/S2  Pulm: CTA B/L; no W/R/R  GI: soft, NT/ND, +BS, slight tenderness to palpation in LLQ, upper abdominal scar present 2/2 previous surgery   Extremities: WWP; no edema, clubbing or cyanosis  Vasc: 2+ radial, DP pulses B/L  Neuro: AAOx3; no focal deficits    MEDICATIONS:  MEDICATIONS  (STANDING):  clonazePAM  Tablet 0.25 milliGRAM(s) Oral every 24 hours  dextrose 5%. 1000 milliLiter(s) (100 mL/Hr) IV Continuous <Continuous>  dextrose 5%. 1000 milliLiter(s) (50 mL/Hr) IV Continuous <Continuous>  dextrose 50% Injectable 25 Gram(s) IV Push once  dextrose 50% Injectable 12.5 Gram(s) IV Push once  dextrose 50% Injectable 25 Gram(s) IV Push once  escitalopram 15 milliGRAM(s) Oral daily  glucagon  Injectable 1 milliGRAM(s) IntraMuscular once  insulin lispro (ADMELOG) corrective regimen sliding scale   SubCutaneous three times a day before meals  methylPREDNISolone sodium succinate Injectable 20 milliGRAM(s) IV Push every 8 hours  pantoprazole    Tablet 40 milliGRAM(s) Oral before breakfast    MEDICATIONS  (PRN):  acetaminophen     Tablet .. 650 milliGRAM(s) Oral every 6 hours PRN Temp greater or equal to 38C (100.4F), Mild Pain (1 - 3)  aluminum hydroxide/magnesium hydroxide/simethicone Suspension 30 milliLiter(s) Oral every 4 hours PRN Dyspepsia  dextrose Oral Gel 15 Gram(s) Oral once PRN Blood Glucose LESS THAN 70 milliGRAM(s)/deciliter  melatonin 3 milliGRAM(s) Oral at bedtime PRN Insomnia  ondansetron Injectable 4 milliGRAM(s) IV Push every 8 hours PRN Nausea and/or Vomiting      ALLERGIES:  Allergies    penicillins (Hives)    Intolerances        LABS:                        13.0   9.68  )-----------( 510      ( 05 Jul 2022 17:25 )             40.9     07-05    138  |  99  |  20  ----------------------------<  173<H>  3.8   |  28  |  0.73    Ca    9.1      05 Jul 2022 17:25    TPro  6.9  /  Alb  3.7  /  TBili  0.2  /  DBili  x   /  AST  15  /  ALT  19  /  AlkPhos  71  07-05        RADIOLOGY & ADDITIONAL TESTS: Reviewed.

## 2022-07-06 NOTE — DISCHARGE NOTE PROVIDER - NSDCFUSCHEDAPPT_GEN_ALL_CORE_FT
Celena Roa  [facility]  GASTR[address] [address] [facility]  Scheduled Appointment: 07/27/2022

## 2022-07-06 NOTE — CHART NOTE - NSCHARTNOTEFT_GEN_A_CORE
Flexible Sigmoidoscopy performed in Endoscopy Suite with Dr Hernandez and myself, findings as noted below:    Impression:  -Continuous erythematous, edematous mucosa with exudates/ulceration, loss of vascularity and spontaneous bleeding, most prominent in rectum, consistent with DAVIDSON 3 disease. Advanced gastroscope to 25 cm before withdrawing scope. No retroflexion performed 2/2 erythema/edema (Biopsy).  -External hemorrhoids.    Plan:  -Resume diet (NO DAIRY)      -c/w SoluMedrol 20 mg IVP q8h  -Daily CRPs

## 2022-07-06 NOTE — DISCHARGE NOTE PROVIDER - NSDCMRMEDTOKEN_GEN_ALL_CORE_FT
BUPROPION HYDROCHLORIDE 75MG TAB:   CHOLESTYRAMINE 4GM PACK: DISSOLVE AND TAKE 1 PACKET THREE TIMES A DAY  ESCITALOPRAM OXALATE 10MG TAB:   HYDROCORTISONE 7X60ML NANI:   MESALAMINE ER 0.375GM CAP:   PREDNISONE 20MG TAB:    BUPROPION HYDROCHLORIDE 75MG TAB:   CHOLESTYRAMINE 4GM PACK: DISSOLVE AND TAKE 1 PACKET THREE TIMES A DAY  ESCITALOPRAM OXALATE 10MG TAB:   MESALAMINE ER 0.375GM CAP:   PREDNISONE 20MG TAB:    BUPROPION HYDROCHLORIDE 75MG TAB:   ESCITALOPRAM OXALATE 10MG TAB:   PREDNISONE 20MG TAB:    BUPROPION HYDROCHLORIDE 75MG TAB: 1 tab(s) orally once a day  ESCITALOPRAM OXALATE 10MG TAB: 1 tab(s) orally once a day  predniSONE 10 mg oral tablet: 6 tab(s) orally once a day x 5 days  5.5 tab(s) orally once a day x 7 days  5 tab(s) orally once a day x 7 days  4.5 tab(s) orally once a day x 7 days  4 tab(s) orally once a day x 7 days  3.5 tab(s) orally once a day x 7 days  3 tab(s) orally once a day x 7 days  2.5 tab(s) orally once a day x 7 days  2 tab(s) orally once a day x 7 days  1.5 tab(s) orally once a day x 7 days  1 tab(s) orally once a day x 7 days  0.5 tab(s) orally once a day x 7 days

## 2022-07-06 NOTE — PROGRESS NOTE ADULT - PROBLEM SELECTOR PLAN 1
Hx of UC with last colonoscopy 2022. Previously, condition was well controlled with Sulfasalazine 800 mg daily x 10 years. However, of recent, pts condition refractory to budesonide, hydrocortisone enemas/mesalamine and more recently prednisone 60 mg.  - OK to hold mesalamine 2 pills of .375mg BID while inpatient   - GI following   - GI PCR & Cdiff   - c/w SoluMedrol 20 mg IVP q8h (sliding scale while on steroids)   - NPO after MN, except for meds with sips of water with tentative plan for flexible sigmoidoscopy tomorrow Hx of UC with last colonoscopy 2022. Previously, condition was well controlled with Sulfasalazine 800 mg daily x 10 years. However, of recent, pts condition refractory to budesonide, hydrocortisone enemas/mesalamine and more recently prednisone 60 mg.  - OK to hold mesalamine 2 pills of .375mg BID while inpatient   - GI following   - GI PCR & Cdiff negative   - c/w SoluMedrol 20 mg IVP q8h (sliding scale while on steroids)   - NPO after MN, except for meds with sips of water  - f/u on results scheduled flexible sigmoidoscopy today at 1pm. Hx of UC with last colonoscopy 2022. Previously, condition was well controlled with Sulfasalazine 800 mg daily x 10 years. However, of recent, pts condition refractory to budesonide, hydrocortisone enemas/mesalamine and more recently prednisone 60 mg. 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   - hold mesalamine 2 pills of .375mg BID while inpatient   - GI PCR & Cdiff negative   -GI consulted and following   - c/w SoluMedrol 20 mg IVP q8h (sliding scale while on steroids)   - Resume regular diet (no dairy)

## 2022-07-06 NOTE — PROGRESS NOTE ADULT - ASSESSMENT
30 yo F w PMHx of UC, p/w persistent bloody BMs with increased urgency and frequency, admitted for UC flair.  32 yo F w PMHx of indeterminate UC vs Crohns who p/w persistent bloody BMs with increased urgency and frequency of 5 months duration, admitted for UC/Crohn's flair and further workup. Pending flex sigmoidoscopy today. 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

## 2022-07-06 NOTE — DISCHARGE NOTE PROVIDER - NSDCFUADDAPPT_GEN_ALL_CORE_FT
(1) As per office of your PCP, Dr. Carolyn Franco, this provider will be out office until 07/11/2022. Please call the office following your hospital discharge to schedule an appointment within 2 weeks.    Appointment was facilitated by Ms. TIFFANI Barton, Referral Coordinator. (1) As per office of your PCP, Dr. Carolyn Franco will be out office until 07/11/2022. Please call the office following your hospital discharge to schedule an appointment within 2 weeks.    Appointment was facilitated by Ms. TIFFANI Barton, Referral Coordinator. (1) Please follow up with your Primary Care Provider, Dr. Carolyn Franco via televisit on 07/18/2022 at 2:00pm.    The office will confirm this appointment.    Appointment was scheduled by Ms. TIFFANI Barton, Referral Coordinator.

## 2022-07-06 NOTE — DISCHARGE NOTE PROVIDER - CARE PROVIDER_API CALL
Carolyn Franco  Internal Medicine  [address] [place] [address]  Phone: [phone]  Fax: [phone]  Follow Up Time:    Carolyn Franco  Internal Medicine  [address] [place] [address]  Phone: [phone]  Fax: [phone]  Follow Up Time:     Celena Roa)  Gastroenterology; Internal Medicine  [address] [place] [address]  Phone: [phone]  Fax: [phone]  Follow Up Time:    Carolyn Franco  Internal Medicine  [address] [place] [address]  Phone: [phone]  Fax: [phone]  Follow Up Time:     Celena Roa)  Gastroenterology; Internal Medicine  [address] [place] [address]  Phone: [phone]  Fax: [phone]  Scheduled Appointment: 07/15/2022   Echo Francoth  Internal Medicine  [address] [place] [address]  Phone: [phone]  Fax: [phone]  Scheduled Appointment: 07/18/2022 02:00 PM    Celena Roa)  Gastroenterology; Internal Medicine  [address] [place] [address]  Phone: [phone]  Fax: [phone]  Scheduled Appointment: 07/15/2022

## 2022-07-06 NOTE — DISCHARGE NOTE PROVIDER - NSDCCPCAREPLAN_GEN_ALL_CORE_FT
PRINCIPAL DISCHARGE DIAGNOSIS  Diagnosis: Exacerbation of ulcerative colitis  Assessment and Plan of Treatment:        PRINCIPAL DISCHARGE DIAGNOSIS  Diagnosis: Exacerbation of ulcerative colitis  Assessment and Plan of Treatment: Ulcerative colitis is a condition that causes diarrhea, belly pain, and bloody bowel movements. These symptoms happen because the large intestine becomes inflamed and gets sores, called "ulcers."         PRINCIPAL DISCHARGE DIAGNOSIS  Diagnosis: Exacerbation of ulcerative colitis  Assessment and Plan of Treatment: Ulcerative colitis (UC) is a disease in which the lining fo the colon (large intestine) becomes inflamed and declopes sores leading to bleeding and diarrhea. The inflammation most commonly affects the lower part of the colon but it can involve the entire colon.  The main symptom of ulcerative colitis is diarrhea which may contain blood or mucus. People may also experience abdominal pain and pain in the rectal area.  Additionally, for reasons that are not well understood people can also develop inflammation outside of the colon that can affect large joints, the eyes, the skin, for example. Symptoms can be mild, moderate, or severe and can flucutate over time. Periods of active symptoms are called "flares". When symptoms are under control and the colon is not inflamed the ulcrativce colitis is considered to be in remission.   You presented with a history of abdominal pain and bloody bowel movements of 5 months duration with a recent history of a colonoscopy in May, in which a diagnosis of UC was given. A flex sigmoidoscopy was done to better visualize your large intestines, in which confirmed the diagnosis. While you were admitted for flare management, you recieved intravenous steroids to help decrease the inflammation in the colon. P  Please follow with your GI doctor and primary care physician once discharged for further management.          PRINCIPAL DISCHARGE DIAGNOSIS  Diagnosis: Exacerbation of ulcerative colitis  Assessment and Plan of Treatment: Ulcerative colitis (UC) is a disease in which the lining fo the colon (large intestine) becomes inflamed and declopes sores leading to bleeding and diarrhea. The main symptom of ulcerative colitis is diarrhea which may contain blood or mucus. Additionally, for reasons that are not well understood people can also develop inflammation outside of the colon that can affect large joints, the eyes, the skin, for example. Periods of active symptoms are called "flares". When symptoms are under control and the colon is not inflamed the ulcerative colitis is considered to be in remission.    A test was done to better visualize your large intestines, in which confirmed the diagnosis of ulceratve colitis. While you were admitted for flare management, you recieved IV steroids to help decrease the inflammation and were switched to oral steroids once it was safe. You will be slowly decreasing the strength of oral prednisone so your body can get used to it. At home, you will be taking predisone ??? mg for ???? days (7/?-7/?), followed by prednisone ??? mg for ??? days (7?-7?).   Please follow with your GI doctor and primary care physician once discharged for further management.         SECONDARY DISCHARGE DIAGNOSES  Diagnosis: Anxiety  Assessment and Plan of Treatment:      PRINCIPAL DISCHARGE DIAGNOSIS  Diagnosis: Exacerbation of ulcerative colitis  Assessment and Plan of Treatment: Ulcerative colitis (UC) is a disease in which the lining fo the colon (large intestine) becomes inflamed and declopes sores leading to bleeding and diarrhea. The main symptom of ulcerative colitis is diarrhea which may contain blood or mucus.  Periods of active symptoms are called "flares". When symptoms are under control and the colon is not inflamed the ulcerative colitis is considered to be in remission.    A test was done to better visualize your large intestines, in which confirmed the diagnosis of ulceratve colitis. While you were admitted for flare management, you recieved IV steroids to help decrease the inflammation and were switched to oral steroids once it was safe. You will be slowly decreasing the strength of oral prednisone so your body can get used to it. At home, you will be taking Predisone starting at 60 mg and tapering 5 mg down every 7 days. Please see below for dosing and dates. If flare up symptoms occur again, please increase to the dose you were taking before you had a flare (example: if you started having symptoms at 45 mg, please go back up to 50 mg until they resolve).    Prednisone taper: 60 mg PO 7 days (7/11-7/17), 55 mg PO (7/18-7/24), 50 mg PO (7/25-7/31), 45 mg PO (8/1-8/7). Continue 5 mg taper every 7 days, until your prednisone is finished tapering.   Please follow with your GI doctor on Friday, 7/15 and primary care physician once discharged for further management.         SECONDARY DISCHARGE DIAGNOSES  Diagnosis: Anxiety  Assessment and Plan of Treatment: Please continue taking your home medications for anxiety that you were on prior to your hospital admission.

## 2022-07-07 PROBLEM — K51.90 ULCERATIVE COLITIS, UNSPECIFIED, WITHOUT COMPLICATIONS: Chronic | Status: ACTIVE | Noted: 2022-07-05

## 2022-07-07 PROBLEM — K50.90 CROHN'S DISEASE, UNSPECIFIED, WITHOUT COMPLICATIONS: Chronic | Status: ACTIVE | Noted: 2022-07-05

## 2022-07-07 LAB
ALBUMIN SERPL ELPH-MCNC: 3.2 G/DL — LOW (ref 3.3–5)
ALP SERPL-CCNC: 73 U/L — SIGNIFICANT CHANGE UP (ref 40–120)
ALT FLD-CCNC: 16 U/L — SIGNIFICANT CHANGE UP (ref 10–45)
ANION GAP SERPL CALC-SCNC: 9 MMOL/L — SIGNIFICANT CHANGE UP (ref 5–17)
AST SERPL-CCNC: 10 U/L — SIGNIFICANT CHANGE UP (ref 10–40)
BASOPHILS # BLD AUTO: 0.02 K/UL — SIGNIFICANT CHANGE UP (ref 0–0.2)
BASOPHILS NFR BLD AUTO: 0.2 % — SIGNIFICANT CHANGE UP (ref 0–2)
BILIRUB SERPL-MCNC: 0.2 MG/DL — SIGNIFICANT CHANGE UP (ref 0.2–1.2)
BUN SERPL-MCNC: 14 MG/DL — SIGNIFICANT CHANGE UP (ref 7–23)
CALCIUM SERPL-MCNC: 8.4 MG/DL — SIGNIFICANT CHANGE UP (ref 8.4–10.5)
CHLORIDE SERPL-SCNC: 100 MMOL/L — SIGNIFICANT CHANGE UP (ref 96–108)
CO2 SERPL-SCNC: 26 MMOL/L — SIGNIFICANT CHANGE UP (ref 22–31)
CREAT SERPL-MCNC: 0.72 MG/DL — SIGNIFICANT CHANGE UP (ref 0.5–1.3)
CRP SERPL-MCNC: <3 MG/L — SIGNIFICANT CHANGE UP (ref 0–4)
EGFR: 114 ML/MIN/1.73M2 — SIGNIFICANT CHANGE UP
EOSINOPHIL # BLD AUTO: 0.01 K/UL — SIGNIFICANT CHANGE UP (ref 0–0.5)
EOSINOPHIL NFR BLD AUTO: 0.1 % — SIGNIFICANT CHANGE UP (ref 0–6)
GLUCOSE BLDC GLUCOMTR-MCNC: 123 MG/DL — HIGH (ref 70–99)
GLUCOSE BLDC GLUCOMTR-MCNC: 123 MG/DL — HIGH (ref 70–99)
GLUCOSE BLDC GLUCOMTR-MCNC: 131 MG/DL — HIGH (ref 70–99)
GLUCOSE BLDC GLUCOMTR-MCNC: 199 MG/DL — HIGH (ref 70–99)
GLUCOSE SERPL-MCNC: 148 MG/DL — HIGH (ref 70–99)
HCT VFR BLD CALC: 34.6 % — SIGNIFICANT CHANGE UP (ref 34.5–45)
HGB BLD-MCNC: 11.1 G/DL — LOW (ref 11.5–15.5)
IMM GRANULOCYTES NFR BLD AUTO: 1.9 % — HIGH (ref 0–1.5)
LYMPHOCYTES # BLD AUTO: 1.16 K/UL — SIGNIFICANT CHANGE UP (ref 1–3.3)
LYMPHOCYTES # BLD AUTO: 12.2 % — LOW (ref 13–44)
MAGNESIUM SERPL-MCNC: 2.1 MG/DL — SIGNIFICANT CHANGE UP (ref 1.6–2.6)
MCHC RBC-ENTMCNC: 25.9 PG — LOW (ref 27–34)
MCHC RBC-ENTMCNC: 32.1 GM/DL — SIGNIFICANT CHANGE UP (ref 32–36)
MCV RBC AUTO: 80.8 FL — SIGNIFICANT CHANGE UP (ref 80–100)
MONOCYTES # BLD AUTO: 0.94 K/UL — HIGH (ref 0–0.9)
MONOCYTES NFR BLD AUTO: 9.9 % — SIGNIFICANT CHANGE UP (ref 2–14)
NEUTROPHILS # BLD AUTO: 7.18 K/UL — SIGNIFICANT CHANGE UP (ref 1.8–7.4)
NEUTROPHILS NFR BLD AUTO: 75.7 % — SIGNIFICANT CHANGE UP (ref 43–77)
NRBC # BLD: 0 /100 WBCS — SIGNIFICANT CHANGE UP (ref 0–0)
PHOSPHATE SERPL-MCNC: 3.3 MG/DL — SIGNIFICANT CHANGE UP (ref 2.5–4.5)
PLATELET # BLD AUTO: 389 K/UL — SIGNIFICANT CHANGE UP (ref 150–400)
POTASSIUM SERPL-MCNC: 3.8 MMOL/L — SIGNIFICANT CHANGE UP (ref 3.5–5.3)
POTASSIUM SERPL-SCNC: 3.8 MMOL/L — SIGNIFICANT CHANGE UP (ref 3.5–5.3)
PROT SERPL-MCNC: 5.7 G/DL — LOW (ref 6–8.3)
RBC # BLD: 4.28 M/UL — SIGNIFICANT CHANGE UP (ref 3.8–5.2)
RBC # FLD: 14 % — SIGNIFICANT CHANGE UP (ref 10.3–14.5)
SODIUM SERPL-SCNC: 135 MMOL/L — SIGNIFICANT CHANGE UP (ref 135–145)
SURGICAL PATHOLOGY STUDY: SIGNIFICANT CHANGE UP
WBC # BLD: 9.49 K/UL — SIGNIFICANT CHANGE UP (ref 3.8–10.5)
WBC # FLD AUTO: 9.49 K/UL — SIGNIFICANT CHANGE UP (ref 3.8–10.5)

## 2022-07-07 PROCEDURE — 99233 SBSQ HOSP IP/OBS HIGH 50: CPT

## 2022-07-07 PROCEDURE — 99232 SBSQ HOSP IP/OBS MODERATE 35: CPT | Mod: GC

## 2022-07-07 RX ORDER — CHOLESTYRAMINE 4 G/9G
0 POWDER, FOR SUSPENSION ORAL
Qty: 0 | Refills: 8 | DISCHARGE

## 2022-07-07 RX ORDER — POTASSIUM CHLORIDE 20 MEQ
20 PACKET (EA) ORAL ONCE
Refills: 0 | Status: COMPLETED | OUTPATIENT
Start: 2022-07-07 | End: 2022-07-07

## 2022-07-07 RX ORDER — HYDROCORTISONE 20 MG
0 TABLET ORAL
Qty: 0 | Refills: 0 | DISCHARGE

## 2022-07-07 RX ORDER — BUPROPION HYDROCHLORIDE 150 MG/1
150 TABLET, EXTENDED RELEASE ORAL DAILY
Refills: 0 | Status: DISCONTINUED | OUTPATIENT
Start: 2022-07-07 | End: 2022-07-12

## 2022-07-07 RX ORDER — ENOXAPARIN SODIUM 100 MG/ML
30 INJECTION SUBCUTANEOUS EVERY 24 HOURS
Refills: 0 | Status: DISCONTINUED | OUTPATIENT
Start: 2022-07-07 | End: 2022-07-12

## 2022-07-07 RX ORDER — MESALAMINE 400 MG
0 TABLET, DELAYED RELEASE (ENTERIC COATED) ORAL
Qty: 0 | Refills: 0 | DISCHARGE

## 2022-07-07 RX ADMIN — Medication 20 MILLIGRAM(S): at 12:25

## 2022-07-07 RX ADMIN — Medication 20 MILLIGRAM(S): at 04:43

## 2022-07-07 RX ADMIN — BUPROPION HYDROCHLORIDE 150 MILLIGRAM(S): 150 TABLET, EXTENDED RELEASE ORAL at 12:31

## 2022-07-07 RX ADMIN — PANTOPRAZOLE SODIUM 40 MILLIGRAM(S): 20 TABLET, DELAYED RELEASE ORAL at 06:32

## 2022-07-07 RX ADMIN — ENOXAPARIN SODIUM 30 MILLIGRAM(S): 100 INJECTION SUBCUTANEOUS at 12:25

## 2022-07-07 RX ADMIN — Medication 1: at 17:03

## 2022-07-07 RX ADMIN — Medication 20 MILLIGRAM(S): at 17:50

## 2022-07-07 RX ADMIN — Medication 20 MILLIEQUIVALENT(S): at 09:03

## 2022-07-07 RX ADMIN — ESCITALOPRAM OXALATE 15 MILLIGRAM(S): 10 TABLET, FILM COATED ORAL at 12:25

## 2022-07-07 NOTE — PROGRESS NOTE ADULT - SUBJECTIVE AND OBJECTIVE BOX
SUBJECTIVE/OVERNIGHT EVENTS: No acute overnight events. Pt seen in AM at bedside, resting comfortably in bed, and does not appear to be in any acute distress. When asked, pt denies any recent or active fever, chills, nausea, vomiting, headache, acute sob, chest pain, abdominal pain, genitourinary sx, extremity pain or swelling. She is tolerating diet. Has had 2 bowel movements over night, both non bloody and more formed. Feels she is improving and is feeling much better.     VITAL SIGNS:  Vital Signs Last 24 Hrs  T(C): 36.9 (07 Jul 2022 04:33), Max: 37.3 (06 Jul 2022 20:36)  T(F): 98.5 (07 Jul 2022 04:33), Max: 99.1 (06 Jul 2022 20:36)  HR: 58 (07 Jul 2022 04:33) (55 - 68)  BP: 101/66 (07 Jul 2022 04:33) (100/63 - 109/71)  BP(mean): --  RR: 18 (07 Jul 2022 04:33) (18 - 18)  SpO2: 96% (07 Jul 2022 04:33) (96% - 97%)    PHYSICAL EXAM:  General: NAD; speaking in full sentences  HEENT: NC/AT; PERRL; EOMI; MMM  Neck: supple; no JVD  Cardiac: RRR; +S1/S2  Pulm: CTA B/L; no W/R/R  GI: soft, NT/ND, +BS  Extremities: WWP; no edema, clubbing or cyanosis  Vasc: 2+ radial, DP pulses B/L  Neuro: AAOx3; no focal deficits    MEDICATIONS:  MEDICATIONS  (STANDING):  clonazePAM  Tablet 0.25 milliGRAM(s) Oral every 24 hours  dextrose 5%. 1000 milliLiter(s) (100 mL/Hr) IV Continuous <Continuous>  dextrose 5%. 1000 milliLiter(s) (50 mL/Hr) IV Continuous <Continuous>  dextrose 50% Injectable 25 Gram(s) IV Push once  dextrose 50% Injectable 12.5 Gram(s) IV Push once  dextrose 50% Injectable 25 Gram(s) IV Push once  escitalopram 15 milliGRAM(s) Oral daily  glucagon  Injectable 1 milliGRAM(s) IntraMuscular once  insulin lispro (ADMELOG) corrective regimen sliding scale   SubCutaneous Before meals and at bedtime  methylPREDNISolone sodium succinate Injectable 20 milliGRAM(s) IV Push every 8 hours  pantoprazole    Tablet 40 milliGRAM(s) Oral before breakfast    MEDICATIONS  (PRN):  acetaminophen     Tablet .. 650 milliGRAM(s) Oral every 6 hours PRN Temp greater or equal to 38C (100.4F), Mild Pain (1 - 3)  aluminum hydroxide/magnesium hydroxide/simethicone Suspension 30 milliLiter(s) Oral every 4 hours PRN Dyspepsia  dextrose Oral Gel 15 Gram(s) Oral once PRN Blood Glucose LESS THAN 70 milliGRAM(s)/deciliter  melatonin 3 milliGRAM(s) Oral at bedtime PRN Insomnia  ondansetron Injectable 4 milliGRAM(s) IV Push every 8 hours PRN Nausea and/or Vomiting      ALLERGIES:  Allergies    penicillins (Hives)    Intolerances        LABS:                        11.1   9.49  )-----------( 389      ( 07 Jul 2022 07:00 )             34.6     07-07    135  |  100  |  x   ----------------------------<  x   3.8   |  x   |  x     Ca    9.0      06 Jul 2022 05:30  Phos  3.3     07-07  Mg     2.1     07-07    TPro  6.2  /  Alb  3.3  /  TBili  0.2  /  DBili  x   /  AST  13  /  ALT  15  /  AlkPhos  57  07-06    PT/INR - ( 06 Jul 2022 05:30 )   PT: 12.8 sec;   INR: 1.07          PTT - ( 06 Jul 2022 05:30 )  PTT:27.4 sec    RADIOLOGY & ADDITIONAL TESTS: Reviewed.

## 2022-07-07 NOTE — PROGRESS NOTE ADULT - SUBJECTIVE AND OBJECTIVE BOX
GASTROENTEROLOGY PROGRESS NOTE  Patient seen and examined at bedside.  -Flexible Sigmoidoscopy (): Continuous erythematous, edematous mucosa with exudates/ulceration, loss of vascularity and spontaneous bleeding, most prominent in rectum, consistent with DAVIDSON 3 disease. Advanced gastroscope to 25 cm before withdrawing scope. No retroflexion performed 2/2 erythema/edema (Biopsy). External hemorrhoids.  -Remains on SoluMedrol 20 mg q8h   -With 3 small volume BMs overnight, scant blood    ROS: Patient reports 3 BMs overnight, with very small amount of blood. No abdominal pain, nausea/vomiting, fevers, chills.     PERTINENT REVIEW OF SYSTEMS:  CONSTITUTIONAL: No weakness, fevers or chills  HEENT: No visual changes; No vertigo or throat pain   GASTROINTESTINAL: As above.  NEUROLOGICAL: No numbness or weakness  SKIN: No itching, burning, rashes, or lesions     Allergies    penicillins (Hives)    Intolerances      MEDICATIONS:  MEDICATIONS  (STANDING):  buPROPion XL (24-Hour) . 150 milliGRAM(s) Oral daily  clonazePAM  Tablet 0.25 milliGRAM(s) Oral every 24 hours  dextrose 5%. 1000 milliLiter(s) (100 mL/Hr) IV Continuous <Continuous>  dextrose 5%. 1000 milliLiter(s) (50 mL/Hr) IV Continuous <Continuous>  dextrose 50% Injectable 25 Gram(s) IV Push once  dextrose 50% Injectable 12.5 Gram(s) IV Push once  dextrose 50% Injectable 25 Gram(s) IV Push once  enoxaparin Injectable 30 milliGRAM(s) SubCutaneous every 24 hours  escitalopram 15 milliGRAM(s) Oral daily  glucagon  Injectable 1 milliGRAM(s) IntraMuscular once  insulin lispro (ADMELOG) corrective regimen sliding scale   SubCutaneous Before meals and at bedtime  methylPREDNISolone sodium succinate Injectable 20 milliGRAM(s) IV Push every 8 hours  pantoprazole    Tablet 40 milliGRAM(s) Oral before breakfast    MEDICATIONS  (PRN):  acetaminophen     Tablet .. 650 milliGRAM(s) Oral every 6 hours PRN Temp greater or equal to 38C (100.4F), Mild Pain (1 - 3)  aluminum hydroxide/magnesium hydroxide/simethicone Suspension 30 milliLiter(s) Oral every 4 hours PRN Dyspepsia  dextrose Oral Gel 15 Gram(s) Oral once PRN Blood Glucose LESS THAN 70 milliGRAM(s)/deciliter  melatonin 3 milliGRAM(s) Oral at bedtime PRN Insomnia  ondansetron Injectable 4 milliGRAM(s) IV Push every 8 hours PRN Nausea and/or Vomiting    Vital Signs Last 24 Hrs  T(C): 36.9 (2022 04:33), Max: 37.3 (2022 20:36)  T(F): 98.5 (2022 04:33), Max: 99.1 (2022 20:36)  HR: 58 (2022 04:33) (58 - 68)  BP: 101/66 (2022 04:33) (100/63 - 101/66)  BP(mean): --  RR: 18 (2022 04:33) (18 - 18)  SpO2: 96% (2022 04:33) (96% - 96%)    PHYSICAL EXAM:      General: female, sitting in a chair; in no acute distress; mother at bedside  HEENT: MMM, conjunctiva and sclera clear  Gastrointestinal: Soft, non-tender non-distended; Normal bowel sounds; No rebound or guarding; 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 No obvious rash    LABS:                        11.1   9.49  )-----------( 389      ( 2022 07:00 )             34.6     07-07    135  |  100  |  14  ----------------------------<  148<H>  3.8   |  26  |  0.72    Ca    8.4      2022 07:00  Phos  3.3     07-07  Mg     2.1     07-07    TPro  5.7<L>  /  Alb  3.2<L>  /  TBili  0.2  /  DBili  x   /  AST  10  /  ALT  16  /  AlkPhos  73  07-07    PT/INR - ( 2022 05:30 )   PT: 12.8 sec;   INR: 1.07          PTT - ( 2022 05:30 )  PTT:27.4 sec      Urinalysis Basic - ( 2022 18:58 )    Color: Yellow / Appearance: SL Cloudy / S.025 / pH: x  Gluc: x / Ketone: NEGATIVE  / Bili: Negative / Urobili: 0.2 E.U./dL   Blood: x / Protein: NEGATIVE mg/dL / Nitrite: NEGATIVE   Leuk Esterase: NEGATIVE / RBC: < 5 /HPF / WBC < 5 /HPF   Sq Epi: x / Non Sq Epi: 0-5 /HPF / Bacteria: Present /HPF                GI PCR Panel, Stool (collected 2022 19:03)  Source: .Stool Feces  Final Report (2022 20:42):    GI PCR Results: NOT detected    *******Please Note:*******    GI panel PCR evaluates for:    Campylobacter, Plesiomonas shigelloides, Salmonella,    Vibrio, Yersinia enterocolitica, Enteroaggregative    Escherichia coli (EAEC), Enteropathogenic E.coli (EPEC),    Enterotoxigenic E. coli (ETEC) lt/st, Shiga-like    toxin-producing E. coli (STEC) stx1/stx2,    Shigella/ Enteroinvasive E. coli (EIEC), Cryptosporidium,    Cyclospora cayetanensis, Entamoeba histolytica,    Giardia lamblia, Adenovirus F 40/41, Astrovirus,    Norovirus GI/GII, Rotavirus A, Sapovirus    Urinalysis with Rflx Culture (collected 2022 18:58)      RADIOLOGY & ADDITIONAL STUDIES:  Reviewed

## 2022-07-07 NOTE — PROGRESS NOTE ADULT - ASSESSMENT
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

## 2022-07-07 NOTE — PROGRESS NOTE ADULT - ASSESSMENT
32F PMHx sarcoma s/p resection, hx indeterminate colitis (diagnosed as Crohns in 2012 on colonoscopy, then more recently on 2022 colonoscopy with UC) presenting to ED with complaints of persistent bloody BMs, urgency for several months, refractory to PO steroids as an outpatient,  GI consulted for flare of indeterminate colitis.     #Indeterminate Colitis Flare  Patient presenting with several month history of bloody BMs, high frequency (15-20 episodes/day), with urgency, ~20 lb weight loss. With initial diagnosis of Crohn's disease on colonoscopy in 2012, was on Sulfasalazine 800 mg daily x 10 years with no symptoms, then starting in Feb 2022, noted to have frequent bloody BMs, refractory to budesonide, hydrocortisone enemas/mesalamine and more recently Prednisone 40 mg --> 60 mg daily.  Most recent colonoscopy in may 2022 suggestive of UC, noted to have davidson 2 disease. Fecal calprotectin 3449 (6/21/22), Cdiff/GI PCR negative as an outpatient. Unclear etiology for current flare up of symptoms.   -GI PCR & Cdiff negative  -Flexible Sigmoidoscopy (7/6): Continuous erythematous, edematous mucosa with exudates/ulceration, loss of vascularity and spontaneous bleeding, most prominent in rectum, consistent with DAVIDSON 3 disease. Advanced gastroscope to 25 cm before withdrawing scope. No retroflexion performed 2/2 erythema/edema (Biopsy). External hemorrhoids.  -Will follow up pathology  -Clinically improving on SoluMedrol 20 mg IVP q8h, less frequent BMs with less blood noted in stools, recommend transitioning to Prednisone 60 mg PO daily today (7/7) and monitor response on PO steroids over the next 24 hrs   -DVT ppx  -Tentative plan for initiation of Vedolizumab as an outpatient    Case discussed with c attending and primary team.     Amber Kaur DO  Gastroenterology Fellow  Pager: [phone]

## 2022-07-08 ENCOUNTER — APPOINTMENT (OUTPATIENT)
Dept: GASTROENTEROLOGY | Facility: CLINIC | Age: 32
End: 2022-07-08

## 2022-07-08 LAB
ANION GAP SERPL CALC-SCNC: 10 MMOL/L — SIGNIFICANT CHANGE UP (ref 5–17)
BASOPHILS # BLD AUTO: 0.02 K/UL — SIGNIFICANT CHANGE UP (ref 0–0.2)
BASOPHILS NFR BLD AUTO: 0.2 % — SIGNIFICANT CHANGE UP (ref 0–2)
BUN SERPL-MCNC: 13 MG/DL — SIGNIFICANT CHANGE UP (ref 7–23)
CALCIUM SERPL-MCNC: 9 MG/DL — SIGNIFICANT CHANGE UP (ref 8.4–10.5)
CHLORIDE SERPL-SCNC: 100 MMOL/L — SIGNIFICANT CHANGE UP (ref 96–108)
CO2 SERPL-SCNC: 27 MMOL/L — SIGNIFICANT CHANGE UP (ref 22–31)
CREAT SERPL-MCNC: 0.67 MG/DL — SIGNIFICANT CHANGE UP (ref 0.5–1.3)
CRP SERPL-MCNC: <3 MG/L — SIGNIFICANT CHANGE UP (ref 0–4)
EGFR: 119 ML/MIN/1.73M2 — SIGNIFICANT CHANGE UP
EOSINOPHIL # BLD AUTO: 0.01 K/UL — SIGNIFICANT CHANGE UP (ref 0–0.5)
EOSINOPHIL NFR BLD AUTO: 0.1 % — SIGNIFICANT CHANGE UP (ref 0–6)
GLUCOSE BLDC GLUCOMTR-MCNC: 122 MG/DL — HIGH (ref 70–99)
GLUCOSE BLDC GLUCOMTR-MCNC: 159 MG/DL — HIGH (ref 70–99)
GLUCOSE BLDC GLUCOMTR-MCNC: 96 MG/DL — SIGNIFICANT CHANGE UP (ref 70–99)
GLUCOSE BLDC GLUCOMTR-MCNC: 99 MG/DL — SIGNIFICANT CHANGE UP (ref 70–99)
GLUCOSE SERPL-MCNC: 116 MG/DL — HIGH (ref 70–99)
HCT VFR BLD CALC: 36.7 % — SIGNIFICANT CHANGE UP (ref 34.5–45)
HGB BLD-MCNC: 11.7 G/DL — SIGNIFICANT CHANGE UP (ref 11.5–15.5)
IMM GRANULOCYTES NFR BLD AUTO: 2 % — HIGH (ref 0–1.5)
LYMPHOCYTES # BLD AUTO: 1.28 K/UL — SIGNIFICANT CHANGE UP (ref 1–3.3)
LYMPHOCYTES # BLD AUTO: 10.5 % — LOW (ref 13–44)
MAGNESIUM SERPL-MCNC: 2.2 MG/DL — SIGNIFICANT CHANGE UP (ref 1.6–2.6)
MCHC RBC-ENTMCNC: 25.8 PG — LOW (ref 27–34)
MCHC RBC-ENTMCNC: 31.9 GM/DL — LOW (ref 32–36)
MCV RBC AUTO: 81 FL — SIGNIFICANT CHANGE UP (ref 80–100)
MONOCYTES # BLD AUTO: 1.13 K/UL — HIGH (ref 0–0.9)
MONOCYTES NFR BLD AUTO: 9.3 % — SIGNIFICANT CHANGE UP (ref 2–14)
NEUTROPHILS # BLD AUTO: 9.53 K/UL — HIGH (ref 1.8–7.4)
NEUTROPHILS NFR BLD AUTO: 77.9 % — HIGH (ref 43–77)
NRBC # BLD: 0 /100 WBCS — SIGNIFICANT CHANGE UP (ref 0–0)
PHOSPHATE SERPL-MCNC: 3.3 MG/DL — SIGNIFICANT CHANGE UP (ref 2.5–4.5)
PLATELET # BLD AUTO: 407 K/UL — HIGH (ref 150–400)
POTASSIUM SERPL-MCNC: 4.3 MMOL/L — SIGNIFICANT CHANGE UP (ref 3.5–5.3)
POTASSIUM SERPL-SCNC: 4.3 MMOL/L — SIGNIFICANT CHANGE UP (ref 3.5–5.3)
RBC # BLD: 4.53 M/UL — SIGNIFICANT CHANGE UP (ref 3.8–5.2)
RBC # FLD: 14 % — SIGNIFICANT CHANGE UP (ref 10.3–14.5)
SODIUM SERPL-SCNC: 137 MMOL/L — SIGNIFICANT CHANGE UP (ref 135–145)
WBC # BLD: 12.21 K/UL — HIGH (ref 3.8–10.5)
WBC # FLD AUTO: 12.21 K/UL — HIGH (ref 3.8–10.5)

## 2022-07-08 PROCEDURE — 99233 SBSQ HOSP IP/OBS HIGH 50: CPT

## 2022-07-08 PROCEDURE — 99232 SBSQ HOSP IP/OBS MODERATE 35: CPT | Mod: GC

## 2022-07-08 RX ADMIN — ENOXAPARIN SODIUM 30 MILLIGRAM(S): 100 INJECTION SUBCUTANEOUS at 10:53

## 2022-07-08 RX ADMIN — Medication 20 MILLIGRAM(S): at 14:59

## 2022-07-08 RX ADMIN — Medication 0.25 MILLIGRAM(S): at 00:04

## 2022-07-08 RX ADMIN — Medication 20 MILLIGRAM(S): at 10:52

## 2022-07-08 RX ADMIN — Medication 1: at 18:04

## 2022-07-08 RX ADMIN — Medication 20 MILLIGRAM(S): at 23:11

## 2022-07-08 RX ADMIN — Medication 0.25 MILLIGRAM(S): at 23:16

## 2022-07-08 RX ADMIN — PANTOPRAZOLE SODIUM 40 MILLIGRAM(S): 20 TABLET, DELAYED RELEASE ORAL at 07:16

## 2022-07-08 RX ADMIN — ESCITALOPRAM OXALATE 15 MILLIGRAM(S): 10 TABLET, FILM COATED ORAL at 15:07

## 2022-07-08 NOTE — PROGRESS NOTE ADULT - ASSESSMENT
32F PMHx sarcoma s/p resection, hx indeterminate colitis (diagnosed as Crohns in 2012 on colonoscopy, then more recently on 2022 colonoscopy with UC) presenting to ED with complaints of persistent bloody BMs, urgency for several months, refractory to PO steroids as an outpatient,  GI consulted for flare of indeterminate colitis.     #Indeterminate Colitis Flare  Patient presenting with several month history of bloody BMs, high frequency (15-20 episodes/day), with urgency, ~20 lb weight loss. With initial diagnosis of Crohn's disease on colonoscopy in 2012, was on Sulfasalazine 800 mg daily x 10 years with no symptoms, then starting in Feb 2022, noted to have frequent bloody BMs, refractory to budesonide, hydrocortisone enemas/mesalamine and more recently Prednisone 40 mg --> 60 mg daily.  Most recent colonoscopy in may 2022 suggestive of UC, noted to have davidson 2 disease. Fecal calprotectin 3449 (6/21/22), Cdiff/GI PCR negative as an outpatient. Unclear etiology for current flare up of symptoms.   -GI PCR & Cdiff negative  -Flexible Sigmoidoscopy (7/6): Continuous erythematous, edematous mucosa with exudates/ulceration, loss of vascularity and spontaneous bleeding, most prominent in rectum, consistent with DAVIDSON 3 disease. Advanced gastroscope to 25 cm before withdrawing scope. No retroflexion performed 2/2 erythema/edema (Biopsy). External hemorrhoids.       *Pathology: Sigmoid --> Colonic mucosa showing moderate active chronic inflammation with cryptitis; Rectum --> Rectal mucosa showing marked active chronic inflammation,cryptitis and reactive epithelial changes. No dyplasia identified.  -Was clinically improving on IV steroids, when transitioned to PO steroids, now with increased frequency in BMs with increased blood noted in stools, not responding to PO steroids, please re-start SoluMedrol 20 mg q8h, will continue through the weekend.  -c/w DVT ppx    Case discussed with svc attending and primary team.     Amber Kaur DO  Gastroenterology Fellow  Pager: [phone] 32F PMHx sarcoma s/p resection, hx indeterminate colitis (diagnosed as Crohns in 2012 on colonoscopy, then more recently on 2022 colonoscopy with UC) presenting to ED with complaints of persistent bloody BMs, urgency for several months, refractory to PO steroids as an outpatient,  GI consulted for flare of UC.     #UC Flare  Patient presenting with several month history of bloody BMs, high frequency (15-20 episodes/day), with urgency, ~20 lb weight loss. With initial diagnosis of Crohn's disease on colonoscopy in 2012, was on Sulfasalazine 800 mg daily x 10 years with no symptoms, then starting in Feb 2022, noted to have frequent bloody BMs, refractory to budesonide, hydrocortisone enemas/mesalamine and more recently Prednisone 40 mg --> 60 mg daily.  Most recent colonoscopy in may 2022 suggestive of UC, noted to have davidson 2 disease. Fecal calprotectin 3449 (6/21/22), Cdiff/GI PCR negative as an outpatient. Unclear etiology for current flare up of symptoms.   -GI PCR & Cdiff negative  -Flexible Sigmoidoscopy (7/6): Continuous erythematous, edematous mucosa with exudates/ulceration, loss of vascularity and spontaneous bleeding, most prominent in rectum, consistent with DAVIDSON 3 disease. Advanced gastroscope to 25 cm before withdrawing scope. No retroflexion performed 2/2 erythema/edema (Biopsy). External hemorrhoids.       *Pathology: Sigmoid --> Colonic mucosa showing moderate active chronic inflammation with cryptitis; Rectum --> Rectal mucosa showing marked active chronic inflammation,cryptitis and reactive epithelial changes. No dyplasia identified. Awaiting CMV   -Was clinically improving on IV steroids, when transitioned to PO steroids, now with increased frequency in BMs with increased blood noted in stools, not responding to PO steroids, please re-start SoluMedrol 20 mg q8h, will continue through the weekend.  -c/w DVT ppx    Case discussed with svc attending and primary team.     Amber Kaur DO  Gastroenterology Fellow  Pager: [phone] 32F PMHx sarcoma s/p resection, hx indeterminate colitis (diagnosed as Crohns in 2012 on colonoscopy, then more recently on 2022 colonoscopy with UC) presenting to ED with complaints of persistent bloody BMs, urgency for several months, refractory to PO steroids as an outpatient,  GI consulted for flare of UC.     #UC Flare  Patient presenting with several month history of bloody BMs, high frequency (15-20 episodes/day), with urgency, ~20 lb weight loss. With initial diagnosis of Crohn's disease on colonoscopy in 2012, was on Sulfasalazine 800 mg daily x 10 years with no symptoms, then starting in Feb 2022, noted to have frequent bloody BMs, refractory to budesonide, hydrocortisone enemas/mesalamine and more recently Prednisone 40 mg --> 60 mg daily.  Most recent colonoscopy in may 2022 suggestive of UC, noted to have davidson 2 disease. Fecal calprotectin 3449 (6/21/22), Cdiff/GI PCR negative as an outpatient. Unclear etiology for current flare up of symptoms.   -GI PCR & Cdiff negative  -Flexible Sigmoidoscopy (7/6): Continuous erythematous, edematous mucosa with exudates/ulceration, loss of vascularity and spontaneous bleeding, most prominent in rectum, consistent with DAVIDSON 3 disease. Advanced gastroscope to 25 cm before withdrawing scope. No retroflexion performed 2/2 erythema/edema (Biopsy). External hemorrhoids.       *Pathology: Sigmoid --> Colonic mucosa showing moderate active chronic inflammation with cryptitis; Rectum --> Rectal mucosa showing marked active chronic inflammation,cryptitis and reactive epithelial changes. No dyplasia identified. Awaiting CMV   -Was clinically improving on IV steroids, when transitioned to PO steroids, now with increased frequency in BMs with increased blood noted in stools, not responding to PO steroids, please re-start SoluMedrol 20 mg q8h, will continue through the weekend. If no improvement with IV steroids, will need to consider for rescue therapy with Remicade early next week  -c/w DVT ppx    Case discussed with c attending and primary team.     Amber Kaur DO  Gastroenterology Fellow  Pager: [phone]

## 2022-07-08 NOTE — PROGRESS NOTE ADULT - SUBJECTIVE AND OBJECTIVE BOX
GASTROENTEROLOGY PROGRESS NOTE  Patient seen and examined at bedside.   -Transitioned from IV to PO steroids yesterday  -With 10 small semi-formed stools during the day, with some blood and 3 BMs overnight    ROS: Patient notes no worsening abdominal pain, however does report several BMs during the day yesterday (10 episodes/day) and 3 BMs overnight. Notes a little bit more blood with her BMs as well.     PERTINENT REVIEW OF SYSTEMS:  CONSTITUTIONAL: No weakness, fevers or chills  HEENT: No visual changes; No vertigo or throat pain   GASTROINTESTINAL: As above.  NEUROLOGICAL: No numbness or weakness  SKIN: No itching, burning, rashes, or lesions     Allergies    penicillins (Hives)    Intolerances      MEDICATIONS:  MEDICATIONS  (STANDING):  buPROPion XL (24-Hour) . 150 milliGRAM(s) Oral daily  clonazePAM  Tablet 0.25 milliGRAM(s) Oral every 24 hours  dextrose 5%. 1000 milliLiter(s) (100 mL/Hr) IV Continuous <Continuous>  dextrose 5%. 1000 milliLiter(s) (50 mL/Hr) IV Continuous <Continuous>  dextrose 50% Injectable 25 Gram(s) IV Push once  dextrose 50% Injectable 12.5 Gram(s) IV Push once  dextrose 50% Injectable 25 Gram(s) IV Push once  enoxaparin Injectable 30 milliGRAM(s) SubCutaneous every 24 hours  escitalopram 15 milliGRAM(s) Oral daily  glucagon  Injectable 1 milliGRAM(s) IntraMuscular once  insulin lispro (ADMELOG) corrective regimen sliding scale   SubCutaneous Before meals and at bedtime  methylPREDNISolone sodium succinate Injectable 20 milliGRAM(s) IV Push every 8 hours  pantoprazole    Tablet 40 milliGRAM(s) Oral before breakfast    MEDICATIONS  (PRN):  acetaminophen     Tablet .. 650 milliGRAM(s) Oral every 6 hours PRN Temp greater or equal to 38C (100.4F), Mild Pain (1 - 3)  aluminum hydroxide/magnesium hydroxide/simethicone Suspension 30 milliLiter(s) Oral every 4 hours PRN Dyspepsia  dextrose Oral Gel 15 Gram(s) Oral once PRN Blood Glucose LESS THAN 70 milliGRAM(s)/deciliter  melatonin 3 milliGRAM(s) Oral at bedtime PRN Insomnia  ondansetron Injectable 4 milliGRAM(s) IV Push every 8 hours PRN Nausea and/or Vomiting    Vital Signs Last 24 Hrs  T(C): 36.8 (08 Jul 2022 09:00), Max: 36.8 (08 Jul 2022 05:27)  T(F): 98.2 (08 Jul 2022 09:00), Max: 98.2 (08 Jul 2022 05:27)  HR: 69 (08 Jul 2022 09:00) (64 - 74)  BP: 114/76 (08 Jul 2022 09:00) (101/68 - 114/76)  BP(mean): 89 (08 Jul 2022 09:00) (89 - 89)  RR: 18 (08 Jul 2022 09:00) (16 - 18)  SpO2: 97% (08 Jul 2022 09:00) (96% - 97%)    Parameters below as of 08 Jul 2022 09:00  Patient On (Oxygen Delivery Method): room air        PHYSICAL EXAM:    General: female, sitting in a chair; in no acute distress; mother at bedside  HEENT: MMM, conjunctiva and sclera clear  Gastrointestinal: Soft, non-tender non-distended; Normal bowel sounds; No rebound or guarding; 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 No obvious rash    LABS:                        11.7   12.21 )-----------( 407      ( 08 Jul 2022 07:35 )             36.7     07-08    137  |  100  |  13  ----------------------------<  116<H>  4.3   |  27  |  0.67    Ca    9.0      08 Jul 2022 07:35  Phos  3.3     07-08  Mg     2.2     07-08    TPro  5.7<L>  /  Alb  3.2<L>  /  TBili  0.2  /  DBili  x   /  AST  10  /  ALT  16  /  AlkPhos  73  07-07          GI PCR Panel, Stool (collected 05 Jul 2022 19:03)  Source: .Stool Feces  Final Report (05 Jul 2022 20:42):    GI PCR Results: NOT detected    *******Please Note:*******    GI panel PCR evaluates for:    Campylobacter, Plesiomonas shigelloides, Salmonella,    Vibrio, Yersinia enterocolitica, Enteroaggregative    Escherichia coli (EAEC), Enteropathogenic E.coli (EPEC),    Enterotoxigenic E. coli (ETEC) lt/st, Shiga-like    toxin-producing E. coli (STEC) stx1/stx2,    Shigella/ Enteroinvasive E. coli (EIEC), Cryptosporidium,    Cyclospora cayetanensis, Entamoeba histolytica,    Giardia lamblia, Adenovirus F 40/41, Astrovirus,    Norovirus GI/GII, Rotavirus A, Sapovirus    Urinalysis with Rflx Culture (collected 05 Jul 2022 18:58)      RADIOLOGY & ADDITIONAL STUDIES:  Reviewed

## 2022-07-08 NOTE — PROGRESS NOTE ADULT - SUBJECTIVE AND OBJECTIVE BOX
** INCOMPLETE **    OVERNIGHT EVENTS:     SUBJECTIVE:    VITAL SIGNS:  Vital Signs Last 24 Hrs  T(C): 36.8 (08 Jul 2022 05:27), Max: 36.8 (08 Jul 2022 05:27)  T(F): 98.2 (08 Jul 2022 05:27), Max: 98.2 (08 Jul 2022 05:27)  HR: 64 (08 Jul 2022 05:27) (64 - 74)  BP: 114/66 (08 Jul 2022 05:27) (101/68 - 114/66)  BP(mean): --  RR: 16 (08 Jul 2022 05:27) (16 - 18)  SpO2: 97% (08 Jul 2022 05:27) (96% - 97%)    Parameters below as of 08 Jul 2022 05:27  Patient On (Oxygen Delivery Method): room air        PHYSICAL EXAM:  General: NAD; speaking in full sentences  HEENT: NC/AT; PERRL; EOMI; MMM  Neck: supple; no JVD  Cardiac: RRR; +S1/S2  Pulm: CTA B/L; no W/R/R  GI: soft, NT/ND, +BS  Extremities: WWP; no edema, clubbing or cyanosis  Vasc: 2+ radial, DP pulses B/L  Neuro: AAOx3; no focal deficits    MEDICATIONS:  MEDICATIONS  (STANDING):  buPROPion XL (24-Hour) . 150 milliGRAM(s) Oral daily  clonazePAM  Tablet 0.25 milliGRAM(s) Oral every 24 hours  dextrose 5%. 1000 milliLiter(s) (100 mL/Hr) IV Continuous <Continuous>  dextrose 5%. 1000 milliLiter(s) (50 mL/Hr) IV Continuous <Continuous>  dextrose 50% Injectable 25 Gram(s) IV Push once  dextrose 50% Injectable 12.5 Gram(s) IV Push once  dextrose 50% Injectable 25 Gram(s) IV Push once  enoxaparin Injectable 30 milliGRAM(s) SubCutaneous every 24 hours  escitalopram 15 milliGRAM(s) Oral daily  glucagon  Injectable 1 milliGRAM(s) IntraMuscular once  insulin lispro (ADMELOG) corrective regimen sliding scale   SubCutaneous Before meals and at bedtime  methylPREDNISolone sodium succinate Injectable 20 milliGRAM(s) IV Push every 8 hours  pantoprazole    Tablet 40 milliGRAM(s) Oral before breakfast    MEDICATIONS  (PRN):  acetaminophen     Tablet .. 650 milliGRAM(s) Oral every 6 hours PRN Temp greater or equal to 38C (100.4F), Mild Pain (1 - 3)  aluminum hydroxide/magnesium hydroxide/simethicone Suspension 30 milliLiter(s) Oral every 4 hours PRN Dyspepsia  dextrose Oral Gel 15 Gram(s) Oral once PRN Blood Glucose LESS THAN 70 milliGRAM(s)/deciliter  melatonin 3 milliGRAM(s) Oral at bedtime PRN Insomnia  ondansetron Injectable 4 milliGRAM(s) IV Push every 8 hours PRN Nausea and/or Vomiting      ALLERGIES:  Allergies    penicillins (Hives)    Intolerances        LABS:                        11.7   12.21 )-----------( 407      ( 08 Jul 2022 07:35 )             36.7     07-08    137  |  100  |  13  ----------------------------<  116<H>  4.3   |  27  |  0.67    Ca    9.0      08 Jul 2022 07:35  Phos  3.3     07-08  Mg     2.2     07-08    TPro  5.7<L>  /  Alb  3.2<L>  /  TBili  0.2  /  DBili  x   /  AST  10  /  ALT  16  /  AlkPhos  73  07-07        RADIOLOGY & ADDITIONAL TESTS: Reviewed. ** INCOMPLETE **    OVERNIGHT EVENTS: no acute overnight events    SUBJECTIVE: Pt reports 10 bowel movements yesterday, one of which was mixed with blood, and three BM over night. No abdominal pain, nausea or vomiting. Tolerating her diet well. Was worried since she felt she was not improving since not receiving her IV steroids.  But otherwise no acute concerns or complaints.  ROS was negative.     VITAL SIGNS:  Vital Signs Last 24 Hrs  T(C): 36.8 (08 Jul 2022 05:27), Max: 36.8 (08 Jul 2022 05:27)  T(F): 98.2 (08 Jul 2022 05:27), Max: 98.2 (08 Jul 2022 05:27)  HR: 64 (08 Jul 2022 05:27) (64 - 74)  BP: 114/66 (08 Jul 2022 05:27) (101/68 - 114/66)  BP(mean): --  RR: 16 (08 Jul 2022 05:27) (16 - 18)  SpO2: 97% (08 Jul 2022 05:27) (96% - 97%)    Parameters below as of 08 Jul 2022 05:27  Patient On (Oxygen Delivery Method): room air        PHYSICAL EXAM:  General: NAD; speaking in full sentences  HEENT: NC/AT; PERRL; EOMI; MMM  Neck: supple; no JVD  Cardiac: RRR; +S1/S2  Pulm: CTA B/L; no W/R/R  GI: soft, slight tenderness to palpation in LLQ, +BS, no guarding  Extremities: WWP; no edema, clubbing or cyanosis  Vasc: 2+ radial, DP pulses B/L  Neuro: AAOx3; no focal deficits    MEDICATIONS:  MEDICATIONS  (STANDING):  buPROPion XL (24-Hour) . 150 milliGRAM(s) Oral daily  clonazePAM  Tablet 0.25 milliGRAM(s) Oral every 24 hours  dextrose 5%. 1000 milliLiter(s) (100 mL/Hr) IV Continuous <Continuous>  dextrose 5%. 1000 milliLiter(s) (50 mL/Hr) IV Continuous <Continuous>  dextrose 50% Injectable 25 Gram(s) IV Push once  dextrose 50% Injectable 12.5 Gram(s) IV Push once  dextrose 50% Injectable 25 Gram(s) IV Push once  enoxaparin Injectable 30 milliGRAM(s) SubCutaneous every 24 hours  escitalopram 15 milliGRAM(s) Oral daily  glucagon  Injectable 1 milliGRAM(s) IntraMuscular once  insulin lispro (ADMELOG) corrective regimen sliding scale   SubCutaneous Before meals and at bedtime  methylPREDNISolone sodium succinate Injectable 20 milliGRAM(s) IV Push every 8 hours  pantoprazole    Tablet 40 milliGRAM(s) Oral before breakfast    MEDICATIONS  (PRN):  acetaminophen     Tablet .. 650 milliGRAM(s) Oral every 6 hours PRN Temp greater or equal to 38C (100.4F), Mild Pain (1 - 3)  aluminum hydroxide/magnesium hydroxide/simethicone Suspension 30 milliLiter(s) Oral every 4 hours PRN Dyspepsia  dextrose Oral Gel 15 Gram(s) Oral once PRN Blood Glucose LESS THAN 70 milliGRAM(s)/deciliter  melatonin 3 milliGRAM(s) Oral at bedtime PRN Insomnia  ondansetron Injectable 4 milliGRAM(s) IV Push every 8 hours PRN Nausea and/or Vomiting      ALLERGIES:  Allergies    penicillins (Hives)    Intolerances        LABS:                        11.7   12.21 )-----------( 407      ( 08 Jul 2022 07:35 )             36.7     07-08    137  |  100  |  13  ----------------------------<  116<H>  4.3   |  27  |  0.67    Ca    9.0      08 Jul 2022 07:35  Phos  3.3     07-08  Mg     2.2     07-08    TPro  5.7<L>  /  Alb  3.2<L>  /  TBili  0.2  /  DBili  x   /  AST  10  /  ALT  16  /  AlkPhos  73  07-07        RADIOLOGY & ADDITIONAL TESTS: Reviewed. OVERNIGHT EVENTS: no acute overnight events    SUBJECTIVE: Pt reports 10 bowel movements yesterday, one of which was mixed with blood, and three BM over night. No abdominal pain, nausea or vomiting. Tolerating her diet well. Was worried since she felt she was not improving since not receiving her IV steroids.  But otherwise no acute concerns or complaints.  ROS was negative.     VITAL SIGNS:  Vital Signs Last 24 Hrs  T(C): 36.8 (08 Jul 2022 05:27), Max: 36.8 (08 Jul 2022 05:27)  T(F): 98.2 (08 Jul 2022 05:27), Max: 98.2 (08 Jul 2022 05:27)  HR: 64 (08 Jul 2022 05:27) (64 - 74)  BP: 114/66 (08 Jul 2022 05:27) (101/68 - 114/66)  BP(mean): --  RR: 16 (08 Jul 2022 05:27) (16 - 18)  SpO2: 97% (08 Jul 2022 05:27) (96% - 97%)    Parameters below as of 08 Jul 2022 05:27  Patient On (Oxygen Delivery Method): room air        PHYSICAL EXAM:  General: NAD; speaking in full sentences  HEENT: NC/AT; PERRL; EOMI; MMM  Neck: supple; no JVD  Cardiac: RRR; +S1/S2  Pulm: CTA B/L; no W/R/R  GI: soft, slight tenderness to palpation in LLQ, +BS, no guarding  Extremities: WWP; no edema, clubbing or cyanosis  Vasc: 2+ radial, DP pulses B/L  Neuro: AAOx3; no focal deficits    MEDICATIONS:  MEDICATIONS  (STANDING):  buPROPion XL (24-Hour) . 150 milliGRAM(s) Oral daily  clonazePAM  Tablet 0.25 milliGRAM(s) Oral every 24 hours  dextrose 5%. 1000 milliLiter(s) (100 mL/Hr) IV Continuous <Continuous>  dextrose 5%. 1000 milliLiter(s) (50 mL/Hr) IV Continuous <Continuous>  dextrose 50% Injectable 25 Gram(s) IV Push once  dextrose 50% Injectable 12.5 Gram(s) IV Push once  dextrose 50% Injectable 25 Gram(s) IV Push once  enoxaparin Injectable 30 milliGRAM(s) SubCutaneous every 24 hours  escitalopram 15 milliGRAM(s) Oral daily  glucagon  Injectable 1 milliGRAM(s) IntraMuscular once  insulin lispro (ADMELOG) corrective regimen sliding scale   SubCutaneous Before meals and at bedtime  methylPREDNISolone sodium succinate Injectable 20 milliGRAM(s) IV Push every 8 hours  pantoprazole    Tablet 40 milliGRAM(s) Oral before breakfast    MEDICATIONS  (PRN):  acetaminophen     Tablet .. 650 milliGRAM(s) Oral every 6 hours PRN Temp greater or equal to 38C (100.4F), Mild Pain (1 - 3)  aluminum hydroxide/magnesium hydroxide/simethicone Suspension 30 milliLiter(s) Oral every 4 hours PRN Dyspepsia  dextrose Oral Gel 15 Gram(s) Oral once PRN Blood Glucose LESS THAN 70 milliGRAM(s)/deciliter  melatonin 3 milliGRAM(s) Oral at bedtime PRN Insomnia  ondansetron Injectable 4 milliGRAM(s) IV Push every 8 hours PRN Nausea and/or Vomiting      ALLERGIES:  Allergies    penicillins (Hives)    Intolerances        LABS:                        11.7   12.21 )-----------( 407      ( 08 Jul 2022 07:35 )             36.7     07-08    137  |  100  |  13  ----------------------------<  116<H>  4.3   |  27  |  0.67    Ca    9.0      08 Jul 2022 07:35  Phos  3.3     07-08  Mg     2.2     07-08    TPro  5.7<L>  /  Alb  3.2<L>  /  TBili  0.2  /  DBili  x   /  AST  10  /  ALT  16  /  AlkPhos  73  07-07        RADIOLOGY & ADDITIONAL TESTS: Reviewed.

## 2022-07-08 NOTE — PROGRESS NOTE ADULT - PROBLEM SELECTOR PLAN 1
Hx of UC with last colonoscopy 2022. Previously, condition was well controlled with Sulfasalazine 800 mg daily x 10 years. However, of recent, pts condition refractory to budesonide, hydrocortisone enemas/mesalamine and more recently prednisone 60 mg. Home mesalamine held. 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 Tolerating regular diet.   - GI following   - Deescalate to Prednisone 60 mg PO and monitor 24 hours  -Plan for d/c 7/8 with prednisone taper    - Resume regular diet (no dairy) Hx of UC with last colonoscopy 2022. Previously, condition was well controlled with Sulfasalazine 800 mg daily x 10 years. However, of recent, pts condition refractory to budesonide, hydrocortisone enemas/mesalamine and more recently prednisone 60 mg. Home mesalamine held. 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 Tolerating regular diet. Original plan was to d/c IV steroids and begin oral prednisone with taper today 07/08, but pt experienced increase in bowel movements and urgency with blood. Therefore, Oral prednisone was d/c'd and pt was restarted on IV solumedrol and is being monitored now for improvement.   - GI following   - restart Solumedrol 20 mg IVP q8hrs and monitor 48 hrs for treatment response and improvement  - Resume regular diet (no dairy) Hx of UC with last colonoscopy 2022. Previously, condition was well controlled with Sulfasalazine 800 mg daily x 10 years. However, of recent, pts condition refractory to budesonide, hydrocortisone enemas/mesalamine and more recently prednisone 60 mg. Home mesalamine held. 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 Tolerating regular diet. Original plan was to d/c IV steroids and begin oral prednisone with taper today 07/08, but pt experienced increase in bowel movements and urgency with blood. Therefore, Oral prednisone was d/c'd and pt was restarted on IV solumedrol and is being monitored now for improvement.   - GI following   - restart Solumedrol 20 mg IVP q8hrs and monitor 48 hrs for treatment response and improvement  -If no improvement, consider biologic eg. Infliximab   - Resume regular diet (no dairy)

## 2022-07-08 NOTE — PROGRESS NOTE ADULT - ASSESSMENT
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

## 2022-07-09 LAB
ANION GAP SERPL CALC-SCNC: 7 MMOL/L — SIGNIFICANT CHANGE UP (ref 5–17)
ANISOCYTOSIS BLD QL: SLIGHT — SIGNIFICANT CHANGE UP
BASOPHILS # BLD AUTO: 0 K/UL — SIGNIFICANT CHANGE UP (ref 0–0.2)
BASOPHILS NFR BLD AUTO: 0 % — SIGNIFICANT CHANGE UP (ref 0–2)
BUN SERPL-MCNC: 18 MG/DL — SIGNIFICANT CHANGE UP (ref 7–23)
CALCIUM SERPL-MCNC: 8.8 MG/DL — SIGNIFICANT CHANGE UP (ref 8.4–10.5)
CHLORIDE SERPL-SCNC: 99 MMOL/L — SIGNIFICANT CHANGE UP (ref 96–108)
CO2 SERPL-SCNC: 27 MMOL/L — SIGNIFICANT CHANGE UP (ref 22–31)
CREAT SERPL-MCNC: 0.72 MG/DL — SIGNIFICANT CHANGE UP (ref 0.5–1.3)
CRP SERPL-MCNC: <3 MG/L — SIGNIFICANT CHANGE UP (ref 0–4)
EGFR: 114 ML/MIN/1.73M2 — SIGNIFICANT CHANGE UP
EOSINOPHIL # BLD AUTO: 0 K/UL — SIGNIFICANT CHANGE UP (ref 0–0.5)
EOSINOPHIL NFR BLD AUTO: 0 % — SIGNIFICANT CHANGE UP (ref 0–6)
GLUCOSE BLDC GLUCOMTR-MCNC: 108 MG/DL — HIGH (ref 70–99)
GLUCOSE BLDC GLUCOMTR-MCNC: 135 MG/DL — HIGH (ref 70–99)
GLUCOSE BLDC GLUCOMTR-MCNC: 136 MG/DL — HIGH (ref 70–99)
GLUCOSE BLDC GLUCOMTR-MCNC: 165 MG/DL — HIGH (ref 70–99)
GLUCOSE SERPL-MCNC: 146 MG/DL — HIGH (ref 70–99)
HCT VFR BLD CALC: 35.9 % — SIGNIFICANT CHANGE UP (ref 34.5–45)
HGB BLD-MCNC: 11.4 G/DL — LOW (ref 11.5–15.5)
HYPOCHROMIA BLD QL: SLIGHT — SIGNIFICANT CHANGE UP
LYMPHOCYTES # BLD AUTO: 0.8 K/UL — LOW (ref 1–3.3)
LYMPHOCYTES # BLD AUTO: 5.3 % — LOW (ref 13–44)
MAGNESIUM SERPL-MCNC: 2.2 MG/DL — SIGNIFICANT CHANGE UP (ref 1.6–2.6)
MANUAL SMEAR VERIFICATION: SIGNIFICANT CHANGE UP
MCHC RBC-ENTMCNC: 25.6 PG — LOW (ref 27–34)
MCHC RBC-ENTMCNC: 31.8 GM/DL — LOW (ref 32–36)
MCV RBC AUTO: 80.5 FL — SIGNIFICANT CHANGE UP (ref 80–100)
METAMYELOCYTES # FLD: 0.9 % — HIGH (ref 0–0)
MICROCYTES BLD QL: SLIGHT — SIGNIFICANT CHANGE UP
MONOCYTES # BLD AUTO: 0.26 K/UL — SIGNIFICANT CHANGE UP (ref 0–0.9)
MONOCYTES NFR BLD AUTO: 1.7 % — LOW (ref 2–14)
NEUTROPHILS # BLD AUTO: 13.94 K/UL — HIGH (ref 1.8–7.4)
NEUTROPHILS NFR BLD AUTO: 92.1 % — HIGH (ref 43–77)
OVALOCYTES BLD QL SMEAR: SLIGHT — SIGNIFICANT CHANGE UP
PHOSPHATE SERPL-MCNC: 3.9 MG/DL — SIGNIFICANT CHANGE UP (ref 2.5–4.5)
PLAT MORPH BLD: ABNORMAL
PLATELET # BLD AUTO: 385 K/UL — SIGNIFICANT CHANGE UP (ref 150–400)
POIKILOCYTOSIS BLD QL AUTO: SLIGHT — SIGNIFICANT CHANGE UP
POLYCHROMASIA BLD QL SMEAR: SLIGHT — SIGNIFICANT CHANGE UP
POTASSIUM SERPL-MCNC: 4.4 MMOL/L — SIGNIFICANT CHANGE UP (ref 3.5–5.3)
POTASSIUM SERPL-SCNC: 4.4 MMOL/L — SIGNIFICANT CHANGE UP (ref 3.5–5.3)
RBC # BLD: 4.46 M/UL — SIGNIFICANT CHANGE UP (ref 3.8–5.2)
RBC # FLD: 14.3 % — SIGNIFICANT CHANGE UP (ref 10.3–14.5)
RBC BLD AUTO: ABNORMAL
SODIUM SERPL-SCNC: 133 MMOL/L — LOW (ref 135–145)
WBC # BLD: 15.14 K/UL — HIGH (ref 3.8–10.5)
WBC # FLD AUTO: 15.14 K/UL — HIGH (ref 3.8–10.5)

## 2022-07-09 PROCEDURE — 99232 SBSQ HOSP IP/OBS MODERATE 35: CPT

## 2022-07-09 PROCEDURE — 99233 SBSQ HOSP IP/OBS HIGH 50: CPT | Mod: GC

## 2022-07-09 RX ADMIN — Medication 1: at 17:44

## 2022-07-09 RX ADMIN — ESCITALOPRAM OXALATE 15 MILLIGRAM(S): 10 TABLET, FILM COATED ORAL at 12:46

## 2022-07-09 RX ADMIN — BUPROPION HYDROCHLORIDE 150 MILLIGRAM(S): 150 TABLET, EXTENDED RELEASE ORAL at 12:46

## 2022-07-09 RX ADMIN — ENOXAPARIN SODIUM 30 MILLIGRAM(S): 100 INJECTION SUBCUTANEOUS at 12:46

## 2022-07-09 RX ADMIN — PANTOPRAZOLE SODIUM 40 MILLIGRAM(S): 20 TABLET, DELAYED RELEASE ORAL at 07:49

## 2022-07-09 RX ADMIN — Medication 20 MILLIGRAM(S): at 14:52

## 2022-07-09 RX ADMIN — Medication 0.25 MILLIGRAM(S): at 23:15

## 2022-07-09 RX ADMIN — Medication 20 MILLIGRAM(S): at 06:43

## 2022-07-09 RX ADMIN — Medication 20 MILLIGRAM(S): at 21:55

## 2022-07-09 NOTE — PROGRESS NOTE ADULT - PROBLEM SELECTOR PLAN 1
Hx of UC with last colonoscopy 2022. Previously, condition was well controlled with Sulfasalazine 800 mg daily x 10 years. However, of recent, pts condition refractory to budesonide, hydrocortisone enemas/mesalamine and more recently prednisone 60 mg. Home mesalamine held. 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 Tolerating regular diet. Original plan was to d/c IV steroids and begin oral prednisone with taper 07/08, but pt experienced increase in BMs and urgency with blood. Therefore, Oral prednisone was d/c'd and pt was restarted on IV solumedrol and is being monitored now for improvement. Today with improvement in abd pain and BMs.     Plan:   - GI following   - C/w Solumedrol 20 mg IVP q8hrs through the weekend   - monitor 48 hrs for treatment response and improvement. If no improvement, consider biologic eg. Infliximab   - Resume regular diet (no dairy)

## 2022-07-09 NOTE — PROGRESS NOTE ADULT - SUBJECTIVE AND OBJECTIVE BOX
GASTROENTEROLOGY PROGRESS NOTE  Patient seen and examined at bedside.  No BM overnight. 1 formed BM this morning without blood.  Cont on IV steroids    PERTINENT REVIEW OF SYSTEMS:  CONSTITUTIONAL: No weakness, fevers or chills  HEENT: No visual changes; No vertigo or throat pain   GASTROINTESTINAL: As above.  NEUROLOGICAL: No numbness or weakness  SKIN: No itching, burning, rashes, or lesions     Allergies    penicillins (Hives)    Intolerances      MEDICATIONS:  MEDICATIONS  (STANDING):  buPROPion XL (24-Hour) . 150 milliGRAM(s) Oral daily  clonazePAM  Tablet 0.25 milliGRAM(s) Oral every 24 hours  dextrose 5%. 1000 milliLiter(s) (100 mL/Hr) IV Continuous <Continuous>  dextrose 5%. 1000 milliLiter(s) (50 mL/Hr) IV Continuous <Continuous>  dextrose 50% Injectable 25 Gram(s) IV Push once  dextrose 50% Injectable 12.5 Gram(s) IV Push once  dextrose 50% Injectable 25 Gram(s) IV Push once  enoxaparin Injectable 30 milliGRAM(s) SubCutaneous every 24 hours  escitalopram 15 milliGRAM(s) Oral daily  glucagon  Injectable 1 milliGRAM(s) IntraMuscular once  insulin lispro (ADMELOG) corrective regimen sliding scale   SubCutaneous Before meals and at bedtime  methylPREDNISolone sodium succinate Injectable 20 milliGRAM(s) IV Push every 8 hours  pantoprazole    Tablet 40 milliGRAM(s) Oral before breakfast    MEDICATIONS  (PRN):  acetaminophen     Tablet .. 650 milliGRAM(s) Oral every 6 hours PRN Temp greater or equal to 38C (100.4F), Mild Pain (1 - 3)  aluminum hydroxide/magnesium hydroxide/simethicone Suspension 30 milliLiter(s) Oral every 4 hours PRN Dyspepsia  dextrose Oral Gel 15 Gram(s) Oral once PRN Blood Glucose LESS THAN 70 milliGRAM(s)/deciliter  melatonin 3 milliGRAM(s) Oral at bedtime PRN Insomnia  ondansetron Injectable 4 milliGRAM(s) IV Push every 8 hours PRN Nausea and/or Vomiting    Vital Signs Last 24 Hrs  T(C): 36.4 (09 Jul 2022 05:44), Max: 36.7 (08 Jul 2022 22:00)  T(F): 97.5 (09 Jul 2022 05:44), Max: 98 (08 Jul 2022 22:00)  HR: 76 (09 Jul 2022 05:44) (76 - 87)  BP: 102/66 (09 Jul 2022 05:44) (102/66 - 115/73)  BP(mean): --  RR: 17 (09 Jul 2022 05:44) (16 - 17)  SpO2: 97% (09 Jul 2022 05:44) (95% - 97%)    Parameters below as of 09 Jul 2022 05:44  Patient On (Oxygen Delivery Method): room air        PHYSICAL EXAM:    General: in no acute distress  HEENT: MMM, conjunctiva and sclera clear  Gastrointestinal: Soft non-tender non-distended; No rebound or guarding  Skin: Warm and dry. No obvious rash    LABS:                        11.4   15.14 )-----------( 385      ( 09 Jul 2022 05:30 )             35.9     07-09    133<L>  |  99  |  18  ----------------------------<  146<H>  4.4   |  27  |  0.72    Ca    8.8      09 Jul 2022 05:30  Phos  3.9     07-09  Mg     2.2     07-09                        RADIOLOGY & ADDITIONAL STUDIES:  Reviewed

## 2022-07-09 NOTE — PROGRESS NOTE ADULT - ASSESSMENT
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     #UC Flare  Patient presenting with several month history of bloody BMs, high frequency (15-20 episodes/day), with urgency, ~20 lb weight loss. With initial diagnosis of Crohn's disease on colonoscopy in 2012, was on Sulfasalazine 800 mg daily x 10 years with no symptoms, then starting in Feb 2022, noted to have frequent bloody BMs, refractory to budesonide, hydrocortisone enemas/mesalamine and more recently Prednisone 40 mg --> 60 mg daily.  Most recent colonoscopy in may 2022 suggestive of UC, noted to have davidson 2 disease. Fecal calprotectin 3449 (6/21/22), Cdiff/GI PCR negative as an outpatient. Unclear etiology for current flare up of symptoms.   -GI PCR & Cdiff negative  -Flexible Sigmoidoscopy (7/6): Continuous erythematous, edematous mucosa with exudates/ulceration, loss of vascularity and spontaneous bleeding, most prominent in rectum, consistent with DAVIDSON 3 disease. Advanced gastroscope to 25 cm before withdrawing scope. No retroflexion performed 2/2 erythema/edema (Biopsy). External hemorrhoids.       *Pathology: Sigmoid --> Colonic mucosa showing moderate active chronic inflammation with cryptitis; Rectum --> Rectal mucosa showing marked active chronic inflammation,cryptitis and reactive epithelial changes. No dyplasia identified. Awaiting CMV   -Was clinically improving on IV steroids, when transitioned to PO steroids hadh increased frequency in BMs with increased blood noted in stools, not responding to PO steroids   -Continue SoluMedrol 20 mg q8h through the weekend. If no improvement with IV steroids, will need to consider for rescue therapy with Remicade early next week  -c/w DVT ppx    Hollie Mcgee MD  Gastroenterology Fellow, PGY -5   Pager [phone]  [phone]

## 2022-07-09 NOTE — PROGRESS NOTE ADULT - ASSESSMENT
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

## 2022-07-09 NOTE — PROGRESS NOTE ADULT - SUBJECTIVE AND OBJECTIVE BOX
** INCOMPLETE **    OVERNIGHT EVENTS:     SUBJECTIVE:    VITAL SIGNS:  Vital Signs Last 24 Hrs  T(C): 36.4 (09 Jul 2022 05:44), Max: 36.7 (08 Jul 2022 22:00)  T(F): 97.5 (09 Jul 2022 05:44), Max: 98 (08 Jul 2022 22:00)  HR: 76 (09 Jul 2022 05:44) (76 - 87)  BP: 102/66 (09 Jul 2022 05:44) (102/66 - 115/73)  BP(mean): --  RR: 17 (09 Jul 2022 05:44) (16 - 17)  SpO2: 97% (09 Jul 2022 05:44) (95% - 97%)    Parameters below as of 09 Jul 2022 05:44  Patient On (Oxygen Delivery Method): room air        PHYSICAL EXAM:  General: NAD; speaking in full sentences  HEENT: NC/AT; PERRL; EOMI; MMM  Neck: supple; no JVD  Cardiac: RRR; +S1/S2  Pulm: CTA B/L; no W/R/R  GI: soft, NT/ND, +BS  Extremities: WWP; no edema, clubbing or cyanosis  Vasc: 2+ radial, DP pulses B/L  Neuro: AAOx3; no focal deficits    MEDICATIONS:  MEDICATIONS  (STANDING):  buPROPion XL (24-Hour) . 150 milliGRAM(s) Oral daily  clonazePAM  Tablet 0.25 milliGRAM(s) Oral every 24 hours  dextrose 5%. 1000 milliLiter(s) (100 mL/Hr) IV Continuous <Continuous>  dextrose 5%. 1000 milliLiter(s) (50 mL/Hr) IV Continuous <Continuous>  dextrose 50% Injectable 25 Gram(s) IV Push once  dextrose 50% Injectable 12.5 Gram(s) IV Push once  dextrose 50% Injectable 25 Gram(s) IV Push once  enoxaparin Injectable 30 milliGRAM(s) SubCutaneous every 24 hours  escitalopram 15 milliGRAM(s) Oral daily  glucagon  Injectable 1 milliGRAM(s) IntraMuscular once  insulin lispro (ADMELOG) corrective regimen sliding scale   SubCutaneous Before meals and at bedtime  methylPREDNISolone sodium succinate Injectable 20 milliGRAM(s) IV Push every 8 hours  pantoprazole    Tablet 40 milliGRAM(s) Oral before breakfast    MEDICATIONS  (PRN):  acetaminophen     Tablet .. 650 milliGRAM(s) Oral every 6 hours PRN Temp greater or equal to 38C (100.4F), Mild Pain (1 - 3)  aluminum hydroxide/magnesium hydroxide/simethicone Suspension 30 milliLiter(s) Oral every 4 hours PRN Dyspepsia  dextrose Oral Gel 15 Gram(s) Oral once PRN Blood Glucose LESS THAN 70 milliGRAM(s)/deciliter  melatonin 3 milliGRAM(s) Oral at bedtime PRN Insomnia  ondansetron Injectable 4 milliGRAM(s) IV Push every 8 hours PRN Nausea and/or Vomiting      ALLERGIES:  Allergies    penicillins (Hives)    Intolerances        LABS:                        11.4   15.14 )-----------( 385      ( 09 Jul 2022 05:30 )             35.9     07-09    133<L>  |  99  |  18  ----------------------------<  146<H>  4.4   |  27  |  0.72    Ca    8.8      09 Jul 2022 05:30  Phos  3.9     07-09  Mg     2.2     07-09          RADIOLOGY & ADDITIONAL TESTS: Reviewed. OVERNIGHT EVENTS:   No acute events overnight.     SUBJECTIVE:  Pt seen and examined at bedside. Pt had 4 BMs during the day yesterday; denies blood in toilet bowl, states there was some blood when she was wiping. No BMs overnight and 1 BM in the morning. She states abd pain has improved and she is feeling better. Denies nausea or vomiting.     VITAL SIGNS:  Vital Signs Last 24 Hrs  T(C): 36.4 (09 Jul 2022 05:44), Max: 36.7 (08 Jul 2022 22:00)  T(F): 97.5 (09 Jul 2022 05:44), Max: 98 (08 Jul 2022 22:00)  HR: 76 (09 Jul 2022 05:44) (76 - 87)  BP: 102/66 (09 Jul 2022 05:44) (102/66 - 115/73)  BP(mean): --  RR: 17 (09 Jul 2022 05:44) (16 - 17)  SpO2: 97% (09 Jul 2022 05:44) (95% - 97%)    Parameters below as of 09 Jul 2022 05:44  Patient On (Oxygen Delivery Method): room air        PHYSICAL EXAM:  General: Lying comfortably in bed; NAD; speaking in full sentences  HEENT: NC/AT; EOMI; MMM  Neck: supple  Cardiac: RRR; +S1/S2  Pulm: CTA B/L; no W/R/R  GI: Abd soft, nondistended. Very mild tenderness to deep palpation to LLQ. No tenderness to palpation to other quadrants. 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  Extremities: no edema, clubbing or cyanosis  Vasc: 2+ radial pulses B/L  Neuro: AAOx3; no focal deficits    MEDICATIONS:  MEDICATIONS  (STANDING):  buPROPion XL (24-Hour) . 150 milliGRAM(s) Oral daily  clonazePAM  Tablet 0.25 milliGRAM(s) Oral every 24 hours  dextrose 5%. 1000 milliLiter(s) (100 mL/Hr) IV Continuous <Continuous>  dextrose 5%. 1000 milliLiter(s) (50 mL/Hr) IV Continuous <Continuous>  dextrose 50% Injectable 25 Gram(s) IV Push once  dextrose 50% Injectable 12.5 Gram(s) IV Push once  dextrose 50% Injectable 25 Gram(s) IV Push once  enoxaparin Injectable 30 milliGRAM(s) SubCutaneous every 24 hours  escitalopram 15 milliGRAM(s) Oral daily  glucagon  Injectable 1 milliGRAM(s) IntraMuscular once  insulin lispro (ADMELOG) corrective regimen sliding scale   SubCutaneous Before meals and at bedtime  methylPREDNISolone sodium succinate Injectable 20 milliGRAM(s) IV Push every 8 hours  pantoprazole    Tablet 40 milliGRAM(s) Oral before breakfast    MEDICATIONS  (PRN):  acetaminophen     Tablet .. 650 milliGRAM(s) Oral every 6 hours PRN Temp greater or equal to 38C (100.4F), Mild Pain (1 - 3)  aluminum hydroxide/magnesium hydroxide/simethicone Suspension 30 milliLiter(s) Oral every 4 hours PRN Dyspepsia  dextrose Oral Gel 15 Gram(s) Oral once PRN Blood Glucose LESS THAN 70 milliGRAM(s)/deciliter  melatonin 3 milliGRAM(s) Oral at bedtime PRN Insomnia  ondansetron Injectable 4 milliGRAM(s) IV Push every 8 hours PRN Nausea and/or Vomiting      ALLERGIES:  Allergies    penicillins (Hives)    Intolerances        LABS:                        11.4   15.14 )-----------( 385      ( 09 Jul 2022 05:30 )             35.9     07-09    133<L>  |  99  |  18  ----------------------------<  146<H>  4.4   |  27  |  0.72    Ca    8.8      09 Jul 2022 05:30  Phos  3.9     07-09  Mg     2.2     07-09          RADIOLOGY & ADDITIONAL TESTS: Reviewed.

## 2022-07-10 LAB
25(OH)D3 SERPL-MCNC: 40.7 NG/ML
ALBUMIN SERPL ELPH-MCNC: 3.4 G/DL — SIGNIFICANT CHANGE UP (ref 3.3–5)
ALBUMIN SERPL ELPH-MCNC: 4.2 G/DL
ALP BLD-CCNC: 68 U/L
ALP SERPL-CCNC: 76 U/L — SIGNIFICANT CHANGE UP (ref 40–120)
ALT FLD-CCNC: 20 U/L — SIGNIFICANT CHANGE UP (ref 10–45)
ALT SERPL-CCNC: 30 U/L
ANION GAP SERPL CALC-SCNC: 11 MMOL/L
ANION GAP SERPL CALC-SCNC: 7 MMOL/L — SIGNIFICANT CHANGE UP (ref 5–17)
ANTI-A4 FLA2 IGG ELISA: 27.1 EU/ML
ANTI-CBIR1 ELISA: 28.2 EU/ML
ANTI-FLAX IGG ELISA: 27.6 EU/ML
ANTI-OMPC IGA ELISA: 3.1 EU/ML
ASCA IGA ELISA: < 3.1 EU/ML
ASCA IGG ELISA: 3.1 EU/ML
AST SERPL-CCNC: 11 U/L — SIGNIFICANT CHANGE UP (ref 10–40)
AST SERPL-CCNC: 29 U/L
ATG16L1 SNP (RS2241880): NORMAL
BASOPHILS # BLD AUTO: 0.04 K/UL — SIGNIFICANT CHANGE UP (ref 0–0.2)
BASOPHILS # BLD AUTO: 0.09 K/UL
BASOPHILS NFR BLD AUTO: 0.2 % — SIGNIFICANT CHANGE UP (ref 0–2)
BASOPHILS NFR BLD AUTO: 1.1 %
BILIRUB SERPL-MCNC: 0.2 MG/DL
BILIRUB SERPL-MCNC: 0.2 MG/DL — SIGNIFICANT CHANGE UP (ref 0.2–1.2)
BUN SERPL-MCNC: 11 MG/DL
BUN SERPL-MCNC: 21 MG/DL — SIGNIFICANT CHANGE UP (ref 7–23)
C DIFF TOXIN B QL PCR REFLEX: NORMAL
CALCIUM SERPL-MCNC: 8.6 MG/DL — SIGNIFICANT CHANGE UP (ref 8.4–10.5)
CALCIUM SERPL-MCNC: 9.7 MG/DL
CALPROTECTIN FECAL: 3449 UG/G
CHLORIDE SERPL-SCNC: 100 MMOL/L
CHLORIDE SERPL-SCNC: 103 MMOL/L — SIGNIFICANT CHANGE UP (ref 96–108)
CO2 SERPL-SCNC: 28 MMOL/L
CO2 SERPL-SCNC: 28 MMOL/L — SIGNIFICANT CHANGE UP (ref 22–31)
CREAT SERPL-MCNC: 0.7 MG/DL — SIGNIFICANT CHANGE UP (ref 0.5–1.3)
CREAT SERPL-MCNC: 0.83 MG/DL
CRP PROMTHEUS: 19.6 MG/L
CRP SERPL-MCNC: 13 MG/L
DEPRECATED O AND P PREP STL: NORMAL
ECM1 SNP (RS3737240): NORMAL
EGFR: 118 ML/MIN/1.73M2 — SIGNIFICANT CHANGE UP
EGFR: 96 ML/MIN/1.73M2
EOSINOPHIL # BLD AUTO: 0 K/UL — SIGNIFICANT CHANGE UP (ref 0–0.5)
EOSINOPHIL # BLD AUTO: 0.45 K/UL
EOSINOPHIL NFR BLD AUTO: 0 % — SIGNIFICANT CHANGE UP (ref 0–6)
EOSINOPHIL NFR BLD AUTO: 5.4 %
FERRITIN SERPL-MCNC: 18 NG/ML
FOLATE SERPL-MCNC: 19.1 NG/ML
GDH ANTIGEN: NOT DETECTED
GLUCOSE BLDC GLUCOMTR-MCNC: 121 MG/DL — HIGH (ref 70–99)
GLUCOSE BLDC GLUCOMTR-MCNC: 126 MG/DL — HIGH (ref 70–99)
GLUCOSE BLDC GLUCOMTR-MCNC: 131 MG/DL — HIGH (ref 70–99)
GLUCOSE BLDC GLUCOMTR-MCNC: 133 MG/DL — HIGH (ref 70–99)
GLUCOSE SERPL-MCNC: 125 MG/DL
GLUCOSE SERPL-MCNC: 129 MG/DL — HIGH (ref 70–99)
HBV CORE IGG+IGM SER QL: NONREACTIVE
HBV SURFACE AB SER QL: REACTIVE
HBV SURFACE AG SER QL: NONREACTIVE
HCT VFR BLD CALC: 33.7 % — LOW (ref 34.5–45)
HCT VFR BLD CALC: 40 %
HCV AB SER QL: NONREACTIVE
HCV S/CO RATIO: 0.95 S/CO
HEPATITIS A IGG ANTIBODY: NONREACTIVE
HGB BLD-MCNC: 10.9 G/DL — LOW (ref 11.5–15.5)
HGB BLD-MCNC: 12.6 G/DL
IBD AB 7 PNL SER: NORMAL
IBD-SPECIFIC PANCA IFA PATTERN DNASE SENSITIVITY: NOT DETECTED
IBD-SPECIFIC PANCA IFA PERINUCLEAR PATTERN: NOT DETECTED
ICAM-1 PROMETHEUS: 0.66 UG/ML
IMM GRANULOCYTES NFR BLD AUTO: 0.5 %
IMM GRANULOCYTES NFR BLD AUTO: 2.8 % — HIGH (ref 0–1.5)
IRON SATN MFR SERPL: 6 %
IRON SERPL-MCNC: 20 UG/DL
LYMPHOCYTES # BLD AUTO: 1.17 K/UL — SIGNIFICANT CHANGE UP (ref 1–3.3)
LYMPHOCYTES # BLD AUTO: 1.74 K/UL
LYMPHOCYTES # BLD AUTO: 6.4 % — LOW (ref 13–44)
LYMPHOCYTES NFR BLD AUTO: 20.8 %
MAGNESIUM SERPL-MCNC: 2.2 MG/DL — SIGNIFICANT CHANGE UP (ref 1.6–2.6)
MAN DIFF?: NORMAL
MCHC RBC-ENTMCNC: 26 PG
MCHC RBC-ENTMCNC: 26 PG — LOW (ref 27–34)
MCHC RBC-ENTMCNC: 31.5 GM/DL
MCHC RBC-ENTMCNC: 32.3 GM/DL — SIGNIFICANT CHANGE UP (ref 32–36)
MCV RBC AUTO: 80.4 FL — SIGNIFICANT CHANGE UP (ref 80–100)
MCV RBC AUTO: 82.6 FL
MONOCYTES # BLD AUTO: 0.85 K/UL
MONOCYTES # BLD AUTO: 1.09 K/UL — HIGH (ref 0–0.9)
MONOCYTES NFR BLD AUTO: 10.2 %
MONOCYTES NFR BLD AUTO: 5.9 % — SIGNIFICANT CHANGE UP (ref 2–14)
NEUTROPHILS # BLD AUTO: 15.59 K/UL — HIGH (ref 1.8–7.4)
NEUTROPHILS # BLD AUTO: 5.19 K/UL
NEUTROPHILS NFR BLD AUTO: 62 %
NEUTROPHILS NFR BLD AUTO: 84.7 % — HIGH (ref 43–77)
NKX2-3 SNP (RS10883365): NORMAL
NRBC # BLD: 0 /100 WBCS — SIGNIFICANT CHANGE UP (ref 0–0)
PHOSPHATE SERPL-MCNC: 3.8 MG/DL — SIGNIFICANT CHANGE UP (ref 2.5–4.5)
PLATELET # BLD AUTO: 420 K/UL — HIGH (ref 150–400)
PLATELET # BLD AUTO: 577 K/UL
POTASSIUM SERPL-MCNC: 4.4 MMOL/L — SIGNIFICANT CHANGE UP (ref 3.5–5.3)
POTASSIUM SERPL-SCNC: 4.2 MMOL/L
POTASSIUM SERPL-SCNC: 4.4 MMOL/L — SIGNIFICANT CHANGE UP (ref 3.5–5.3)
PROMETHEUS LABORATORY FOOTER: NORMAL
PROT SERPL-MCNC: 5.9 G/DL — LOW (ref 6–8.3)
PROT SERPL-MCNC: 7.3 G/DL
RBC # BLD: 4.19 M/UL — SIGNIFICANT CHANGE UP (ref 3.8–5.2)
RBC # BLD: 4.84 M/UL
RBC # FLD: 12.9 %
RBC # FLD: 14.4 % — SIGNIFICANT CHANGE UP (ref 10.3–14.5)
SAA PROMETHEUS: 54.4 MG/L
SODIUM SERPL-SCNC: 138 MMOL/L — SIGNIFICANT CHANGE UP (ref 135–145)
SODIUM SERPL-SCNC: 139 MMOL/L
STAT3 SNP (RS744166): NORMAL
TIBC SERPL-MCNC: 346 UG/DL
TOXIN A AND B: NOT DETECTED
TPMT ENZYME INTERPRETATION: NORMAL
TPMT ENZYME METHODOLOGY: NORMAL
TPMT ENZYME: 20.8
UIBC SERPL-MCNC: 325 UG/DL
VCAM-1 PROMETHEUS: 1.26 UG/ML
VEGF PROMETHEUS: 386 PG/ML
VIT B12 SERPL-MCNC: 1981 PG/ML
WBC # BLD: 18.4 K/UL — HIGH (ref 3.8–10.5)
WBC # FLD AUTO: 18.4 K/UL — HIGH (ref 3.8–10.5)
WBC # FLD AUTO: 8.36 K/UL

## 2022-07-10 PROCEDURE — 99232 SBSQ HOSP IP/OBS MODERATE 35: CPT | Mod: GC

## 2022-07-10 PROCEDURE — 99232 SBSQ HOSP IP/OBS MODERATE 35: CPT

## 2022-07-10 RX ADMIN — PANTOPRAZOLE SODIUM 40 MILLIGRAM(S): 20 TABLET, DELAYED RELEASE ORAL at 06:45

## 2022-07-10 RX ADMIN — Medication 20 MILLIGRAM(S): at 22:24

## 2022-07-10 RX ADMIN — Medication 20 MILLIGRAM(S): at 13:34

## 2022-07-10 RX ADMIN — ENOXAPARIN SODIUM 30 MILLIGRAM(S): 100 INJECTION SUBCUTANEOUS at 13:34

## 2022-07-10 RX ADMIN — ESCITALOPRAM OXALATE 15 MILLIGRAM(S): 10 TABLET, FILM COATED ORAL at 13:34

## 2022-07-10 RX ADMIN — Medication 20 MILLIGRAM(S): at 06:44

## 2022-07-10 RX ADMIN — Medication 0.25 MILLIGRAM(S): at 23:17

## 2022-07-10 NOTE — PROGRESS NOTE ADULT - ASSESSMENT
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     #UC Flare  Patient presenting with several month history of bloody BMs, high frequency (15-20 episodes/day), with urgency, ~20 lb weight loss. With initial diagnosis of Crohn's disease on colonoscopy in 2012, was on Sulfasalazine 800 mg daily x 10 years with no symptoms, then starting in Feb 2022, noted to have frequent bloody BMs, refractory to budesonide, hydrocortisone enemas/mesalamine and more recently Prednisone 40 mg --> 60 mg daily.  Most recent colonoscopy in may 2022 suggestive of UC, noted to have davidson 2 disease. Fecal calprotectin 3449 (6/21/22), Cdiff/GI PCR negative as an outpatient. Unclear etiology for current flare up of symptoms.   -GI PCR & Cdiff negative  -Flexible Sigmoidoscopy (7/6): Continuous erythematous, edematous mucosa with exudates/ulceration, loss of vascularity and spontaneous bleeding, most prominent in rectum, consistent with DAVIDSON 3 disease. Advanced gastroscope to 25 cm before withdrawing scope. No retroflexion performed 2/2 erythema/edema (Biopsy). External hemorrhoids.       *Pathology: Sigmoid --> Colonic mucosa showing moderate active chronic inflammation with cryptitis; Rectum --> Rectal mucosa showing marked active chronic inflammation,cryptitis and reactive epithelial changes. No dyplasia identified. Awaiting CMV   -Was clinically improving on IV steroids, when transitioned to PO steroids hadh increased frequency in BMs with increased blood noted in stools, not responding to PO steroids   -Continue SoluMedrol 20 mg q8h through the weekend. If no improvement with IV steroids, will need to consider for rescue therapy with Remicade early next week  -c/w DVT ppx    Hollie Mcgee MD  Gastroenterology Fellow, PGY -5   Pager [phone]  [phone] 32F PMHx sarcoma s/p resection, hx indeterminate colitis (diagnosed as Crohns in 2012 on colonoscopy, then more recently on 2022 colonoscopy with UC) presenting to ED with complaints of persistent bloody BMs, urgency for several months, refractory to PO steroids as an outpatient,  GI consulted for flare of UC.     #UC Flare  Patient presenting with several month history of bloody BMs, high frequency (15-20 episodes/day), with urgency, ~20 lb weight loss. With initial diagnosis of Crohn's disease on colonoscopy in 2012, was on Sulfasalazine 800 mg daily x 10 years with no symptoms, then starting in Feb 2022, noted to have frequent bloody BMs, refractory to budesonide, hydrocortisone enemas/mesalamine and more recently Prednisone 40 mg --> 60 mg daily.  Most recent colonoscopy in may 2022 suggestive of UC, noted to have davidson 2 disease. Fecal calprotectin 3449 (6/21/22), Cdiff/GI PCR negative as an outpatient. Unclear etiology for current flare up of symptoms.   -GI PCR & Cdiff negative  -Flexible Sigmoidoscopy (7/6): Continuous erythematous, edematous mucosa with exudates/ulceration, loss of vascularity and spontaneous bleeding, most prominent in rectum, consistent with DAVIDSON 3 disease. Advanced gastroscope to 25 cm before withdrawing scope. No retroflexion performed 2/2 erythema/edema (Biopsy). External hemorrhoids.       *Pathology: Sigmoid --> Colonic mucosa showing moderate active chronic inflammation with cryptitis; Rectum --> Rectal mucosa showing marked active chronic inflammation,cryptitis and reactive epithelial changes. No dyplasia identified. Awaiting CMV   -Was clinically improving on IV steroids, when transitioned to PO steroids hadh increased frequency in BMs with increased blood noted in stools, not responding to PO steroids   - Continue SoluMedrol 20 mg q8h through the weekend.  - Transition to PO Pred 60 mg tomorrow, if symptom worsen on po steroids, will need to consider for rescue therapy with Remicade early next week  - c/w DVT ppx    Hollie Mcgee MD  Gastroenterology Fellow, PGY -5   Pager [phone]  [phone]

## 2022-07-10 NOTE — PROGRESS NOTE ADULT - SUBJECTIVE AND OBJECTIVE BOX
OVERNIGHT EVENTS:   No acute events overnight.     SUBJECTIVE:  Pt seen and examined at bedside. Pt had 4 BMs during the day yesterday; denies blood in toilet bowl, states there was some blood when she was wiping. No BMs overnight and 1 BM in the morning. She states abd pain has improved and she is feeling better. Denies nausea or vomiting.     VITAL SIGNS:  Vital Signs Last 24 Hrs  T(C): 36.9 (10 Jul 2022 06:57), Max: 36.9 (10 Jul 2022 06:57)  T(F): 98.5 (10 Jul 2022 06:57), Max: 98.5 (10 Jul 2022 06:57)  HR: 60 (10 Jul 2022 06:57) (60 - 99)  BP: 108/72 (10 Jul 2022 06:57) (108/72 - 117/79)  BP(mean): --  RR: 18 (10 Jul 2022 06:57) (18 - 18)  SpO2: 98% (10 Jul 2022 06:57) (98% - 98%)    Parameters below as of 10 Jul 2022 06:57  Patient On (Oxygen Delivery Method): room air            PHYSICAL EXAM:  General: Lying comfortably in bed; NAD; speaking in full sentences  HEENT: NC/AT; EOMI; MMM  Neck: supple  Cardiac: RRR; +S1/S2  Pulm: CTA B/L; no W/R/R  GI: Abd soft, nondistended. Very mild tenderness to deep palpation to LLQ. No tenderness to palpation to other quadrants. 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  Extremities: no edema, clubbing or cyanosis  Vasc: 2+ radial pulses B/L  Neuro: AAOx3; no focal deficits    MEDICATIONS:  MEDICATIONS  (STANDING):  buPROPion XL (24-Hour) . 150 milliGRAM(s) Oral daily  clonazePAM  Tablet 0.25 milliGRAM(s) Oral every 24 hours  dextrose 5%. 1000 milliLiter(s) (100 mL/Hr) IV Continuous <Continuous>  dextrose 5%. 1000 milliLiter(s) (50 mL/Hr) IV Continuous <Continuous>  dextrose 50% Injectable 25 Gram(s) IV Push once  dextrose 50% Injectable 12.5 Gram(s) IV Push once  dextrose 50% Injectable 25 Gram(s) IV Push once  enoxaparin Injectable 30 milliGRAM(s) SubCutaneous every 24 hours  escitalopram 15 milliGRAM(s) Oral daily  glucagon  Injectable 1 milliGRAM(s) IntraMuscular once  insulin lispro (ADMELOG) corrective regimen sliding scale   SubCutaneous Before meals and at bedtime  methylPREDNISolone sodium succinate Injectable 20 milliGRAM(s) IV Push every 8 hours  pantoprazole    Tablet 40 milliGRAM(s) Oral before breakfast    MEDICATIONS  (PRN):  acetaminophen     Tablet .. 650 milliGRAM(s) Oral every 6 hours PRN Temp greater or equal to 38C (100.4F), Mild Pain (1 - 3)  aluminum hydroxide/magnesium hydroxide/simethicone Suspension 30 milliLiter(s) Oral every 4 hours PRN Dyspepsia  dextrose Oral Gel 15 Gram(s) Oral once PRN Blood Glucose LESS THAN 70 milliGRAM(s)/deciliter  melatonin 3 milliGRAM(s) Oral at bedtime PRN Insomnia  ondansetron Injectable 4 milliGRAM(s) IV Push every 8 hours PRN Nausea and/or Vomiting      ALLERGIES:  Allergies    penicillins (Hives)    Intolerances        LABS:             07-10    138  |  103  |  21  ----------------------------<  129<H>  4.4   |  28  |  0.70    Ca    8.6      10 Jul 2022 07:07  Phos  3.8     07-10  Mg     2.2     07-10    TPro  5.9<L>  /  Alb  3.4  /  TBili  0.2  /  DBili  x   /  AST  11  /  ALT  20  /  AlkPhos  76  07-10                          10.9   18.40 )-----------( 420      ( 10 Jul 2022 07:07 )             33.7

## 2022-07-10 NOTE — PROGRESS NOTE ADULT - SUBJECTIVE AND OBJECTIVE BOX
GASTROENTEROLOGY PROGRESS NOTE  Patient seen and examined at bedside.  NAEON. 4 BM yesterday, 1 ON, 3 today. All non bloody. Less sense of urgency.    PERTINENT REVIEW OF SYSTEMS:  CONSTITUTIONAL: No weakness, fevers or chills  HEENT: No visual changes; No vertigo or throat pain   GASTROINTESTINAL: As above.  NEUROLOGICAL: No numbness or weakness  SKIN: No itching, burning, rashes, or lesions     Allergies    penicillins (Hives)    Intolerances      MEDICATIONS:  MEDICATIONS  (STANDING):  buPROPion XL (24-Hour) . 150 milliGRAM(s) Oral daily  clonazePAM  Tablet 0.25 milliGRAM(s) Oral every 24 hours  dextrose 5%. 1000 milliLiter(s) (100 mL/Hr) IV Continuous <Continuous>  dextrose 5%. 1000 milliLiter(s) (50 mL/Hr) IV Continuous <Continuous>  dextrose 50% Injectable 25 Gram(s) IV Push once  dextrose 50% Injectable 12.5 Gram(s) IV Push once  dextrose 50% Injectable 25 Gram(s) IV Push once  enoxaparin Injectable 30 milliGRAM(s) SubCutaneous every 24 hours  escitalopram 15 milliGRAM(s) Oral daily  glucagon  Injectable 1 milliGRAM(s) IntraMuscular once  insulin lispro (ADMELOG) corrective regimen sliding scale   SubCutaneous Before meals and at bedtime  methylPREDNISolone sodium succinate Injectable 20 milliGRAM(s) IV Push every 8 hours  pantoprazole    Tablet 40 milliGRAM(s) Oral before breakfast    MEDICATIONS  (PRN):  acetaminophen     Tablet .. 650 milliGRAM(s) Oral every 6 hours PRN Temp greater or equal to 38C (100.4F), Mild Pain (1 - 3)  aluminum hydroxide/magnesium hydroxide/simethicone Suspension 30 milliLiter(s) Oral every 4 hours PRN Dyspepsia  dextrose Oral Gel 15 Gram(s) Oral once PRN Blood Glucose LESS THAN 70 milliGRAM(s)/deciliter  melatonin 3 milliGRAM(s) Oral at bedtime PRN Insomnia  ondansetron Injectable 4 milliGRAM(s) IV Push every 8 hours PRN Nausea and/or Vomiting    Vital Signs Last 24 Hrs  T(C): 36.9 (10 Jul 2022 06:57), Max: 36.9 (10 Jul 2022 06:57)  T(F): 98.5 (10 Jul 2022 06:57), Max: 98.5 (10 Jul 2022 06:57)  HR: 60 (10 Jul 2022 06:57) (60 - 99)  BP: 108/72 (10 Jul 2022 06:57) (108/72 - 117/79)  BP(mean): --  RR: 18 (10 Jul 2022 06:57) (18 - 18)  SpO2: 98% (10 Jul 2022 06:57) (98% - 98%)    Parameters below as of 10 Jul 2022 06:57  Patient On (Oxygen Delivery Method): room air        PHYSICAL EXAM:    General: in no acute distress  HEENT: MMM, conjunctiva and sclera clear  Gastrointestinal: Soft non-tender non-distended; No rebound or guarding  Skin: Warm and dry. No obvious rash    LABS:                        10.9   18.40 )-----------( 420      ( 10 Jul 2022 07:07 )             33.7     07-10    138  |  103  |  21  ----------------------------<  129<H>  4.4   |  28  |  0.70    Ca    8.6      10 Jul 2022 07:07  Phos  3.8     07-10  Mg     2.2     07-10    TPro  5.9<L>  /  Alb  3.4  /  TBili  0.2  /  DBili  x   /  AST  11  /  ALT  20  /  AlkPhos  76  07-10                      RADIOLOGY & ADDITIONAL STUDIES:  Reviewed

## 2022-07-11 LAB
ANION GAP SERPL CALC-SCNC: 10 MMOL/L — SIGNIFICANT CHANGE UP (ref 5–17)
BASOPHILS # BLD AUTO: 0.04 K/UL — SIGNIFICANT CHANGE UP (ref 0–0.2)
BASOPHILS NFR BLD AUTO: 0.2 % — SIGNIFICANT CHANGE UP (ref 0–2)
BLD GP AB SCN SERPL QL: NEGATIVE — SIGNIFICANT CHANGE UP
BUN SERPL-MCNC: 20 MG/DL — SIGNIFICANT CHANGE UP (ref 7–23)
CALCIUM SERPL-MCNC: 9.2 MG/DL — SIGNIFICANT CHANGE UP (ref 8.4–10.5)
CHLORIDE SERPL-SCNC: 98 MMOL/L — SIGNIFICANT CHANGE UP (ref 96–108)
CO2 SERPL-SCNC: 27 MMOL/L — SIGNIFICANT CHANGE UP (ref 22–31)
CREAT SERPL-MCNC: 0.69 MG/DL — SIGNIFICANT CHANGE UP (ref 0.5–1.3)
EGFR: 118 ML/MIN/1.73M2 — SIGNIFICANT CHANGE UP
EOSINOPHIL # BLD AUTO: 0 K/UL — SIGNIFICANT CHANGE UP (ref 0–0.5)
EOSINOPHIL NFR BLD AUTO: 0 % — SIGNIFICANT CHANGE UP (ref 0–6)
GLUCOSE BLDC GLUCOMTR-MCNC: 106 MG/DL — HIGH (ref 70–99)
GLUCOSE BLDC GLUCOMTR-MCNC: 128 MG/DL — HIGH (ref 70–99)
GLUCOSE BLDC GLUCOMTR-MCNC: 136 MG/DL — HIGH (ref 70–99)
GLUCOSE BLDC GLUCOMTR-MCNC: 141 MG/DL — HIGH (ref 70–99)
GLUCOSE SERPL-MCNC: 110 MG/DL — HIGH (ref 70–99)
HCT VFR BLD CALC: 36.2 % — SIGNIFICANT CHANGE UP (ref 34.5–45)
HGB BLD-MCNC: 11.6 G/DL — SIGNIFICANT CHANGE UP (ref 11.5–15.5)
IMM GRANULOCYTES NFR BLD AUTO: 2.8 % — HIGH (ref 0–1.5)
LYMPHOCYTES # BLD AUTO: 1.13 K/UL — SIGNIFICANT CHANGE UP (ref 1–3.3)
LYMPHOCYTES # BLD AUTO: 6.3 % — LOW (ref 13–44)
MAGNESIUM SERPL-MCNC: 2.3 MG/DL — SIGNIFICANT CHANGE UP (ref 1.6–2.6)
MCHC RBC-ENTMCNC: 25.5 PG — LOW (ref 27–34)
MCHC RBC-ENTMCNC: 32 GM/DL — SIGNIFICANT CHANGE UP (ref 32–36)
MCV RBC AUTO: 79.6 FL — LOW (ref 80–100)
MONOCYTES # BLD AUTO: 0.87 K/UL — SIGNIFICANT CHANGE UP (ref 0–0.9)
MONOCYTES NFR BLD AUTO: 4.8 % — SIGNIFICANT CHANGE UP (ref 2–14)
NEUTROPHILS # BLD AUTO: 15.43 K/UL — HIGH (ref 1.8–7.4)
NEUTROPHILS NFR BLD AUTO: 85.9 % — HIGH (ref 43–77)
NRBC # BLD: 0 /100 WBCS — SIGNIFICANT CHANGE UP (ref 0–0)
PHOSPHATE SERPL-MCNC: 4.1 MG/DL — SIGNIFICANT CHANGE UP (ref 2.5–4.5)
PLATELET # BLD AUTO: 432 K/UL — HIGH (ref 150–400)
POTASSIUM SERPL-MCNC: 4.4 MMOL/L — SIGNIFICANT CHANGE UP (ref 3.5–5.3)
POTASSIUM SERPL-SCNC: 4.4 MMOL/L — SIGNIFICANT CHANGE UP (ref 3.5–5.3)
RBC # BLD: 4.55 M/UL — SIGNIFICANT CHANGE UP (ref 3.8–5.2)
RBC # FLD: 14.4 % — SIGNIFICANT CHANGE UP (ref 10.3–14.5)
RH IG SCN BLD-IMP: POSITIVE — SIGNIFICANT CHANGE UP
SODIUM SERPL-SCNC: 135 MMOL/L — SIGNIFICANT CHANGE UP (ref 135–145)
WBC # BLD: 17.98 K/UL — HIGH (ref 3.8–10.5)
WBC # FLD AUTO: 17.98 K/UL — HIGH (ref 3.8–10.5)

## 2022-07-11 PROCEDURE — ZZZZZ: CPT

## 2022-07-11 PROCEDURE — 99232 SBSQ HOSP IP/OBS MODERATE 35: CPT | Mod: GC

## 2022-07-11 RX ORDER — CLONAZEPAM 1 MG
0.25 TABLET ORAL EVERY 24 HOURS
Refills: 0 | Status: DISCONTINUED | OUTPATIENT
Start: 2022-07-11 | End: 2022-07-12

## 2022-07-11 RX ADMIN — Medication 20 MILLIGRAM(S): at 06:36

## 2022-07-11 RX ADMIN — PANTOPRAZOLE SODIUM 40 MILLIGRAM(S): 20 TABLET, DELAYED RELEASE ORAL at 06:36

## 2022-07-11 RX ADMIN — Medication 60 MILLIGRAM(S): at 14:22

## 2022-07-11 RX ADMIN — Medication 0.25 MILLIGRAM(S): at 22:33

## 2022-07-11 RX ADMIN — ESCITALOPRAM OXALATE 15 MILLIGRAM(S): 10 TABLET, FILM COATED ORAL at 13:01

## 2022-07-11 RX ADMIN — ENOXAPARIN SODIUM 30 MILLIGRAM(S): 100 INJECTION SUBCUTANEOUS at 13:01

## 2022-07-11 NOTE — PROGRESS NOTE ADULT - SUBJECTIVE AND OBJECTIVE BOX
GASTROENTEROLOGY PROGRESS NOTE  Patient seen and examined at bedside.  -Remained on SoluMedrol 20 mg q8h   -Notes 4 non-bloody, small volume BMs during the day yesterday and one overnight    ROS: Patient notes feeling well overall, still with some residual LLQ pain with deep palpation, no nausea/vomiting, fevers, chills. Noted 4 non-bloody, small volume BMs yesterday and 1 overnight. Still with some sense of urgency.     PERTINENT REVIEW OF SYSTEMS:  CONSTITUTIONAL: No weakness, fevers or chills  HEENT: No visual changes; No vertigo or throat pain   GASTROINTESTINAL: As above.  NEUROLOGICAL: No numbness or weakness  SKIN: No itching, burning, rashes, or lesions     Allergies    penicillins (Hives)    Intolerances      MEDICATIONS:  MEDICATIONS  (STANDING):  buPROPion XL (24-Hour) . 150 milliGRAM(s) Oral daily  clonazePAM  Tablet 0.25 milliGRAM(s) Oral every 24 hours  dextrose 5%. 1000 milliLiter(s) (100 mL/Hr) IV Continuous <Continuous>  dextrose 5%. 1000 milliLiter(s) (50 mL/Hr) IV Continuous <Continuous>  dextrose 50% Injectable 25 Gram(s) IV Push once  dextrose 50% Injectable 12.5 Gram(s) IV Push once  dextrose 50% Injectable 25 Gram(s) IV Push once  enoxaparin Injectable 30 milliGRAM(s) SubCutaneous every 24 hours  escitalopram 15 milliGRAM(s) Oral daily  glucagon  Injectable 1 milliGRAM(s) IntraMuscular once  insulin lispro (ADMELOG) corrective regimen sliding scale   SubCutaneous Before meals and at bedtime  methylPREDNISolone sodium succinate Injectable 20 milliGRAM(s) IV Push every 8 hours  pantoprazole    Tablet 40 milliGRAM(s) Oral before breakfast    MEDICATIONS  (PRN):  acetaminophen     Tablet .. 650 milliGRAM(s) Oral every 6 hours PRN Temp greater or equal to 38C (100.4F), Mild Pain (1 - 3)  aluminum hydroxide/magnesium hydroxide/simethicone Suspension 30 milliLiter(s) Oral every 4 hours PRN Dyspepsia  dextrose Oral Gel 15 Gram(s) Oral once PRN Blood Glucose LESS THAN 70 milliGRAM(s)/deciliter  melatonin 3 milliGRAM(s) Oral at bedtime PRN Insomnia  ondansetron Injectable 4 milliGRAM(s) IV Push every 8 hours PRN Nausea and/or Vomiting    Vital Signs Last 24 Hrs  T(C): 36.6 (11 Jul 2022 05:01), Max: 36.6 (10 Jul 2022 20:52)  T(F): 97.8 (11 Jul 2022 05:01), Max: 97.8 (10 Jul 2022 20:52)  HR: 72 (11 Jul 2022 05:01) (72 - 78)  BP: 99/61 (11 Jul 2022 05:01) (99/61 - 106/71)  BP(mean): --  RR: 18 (11 Jul 2022 05:01) (18 - 18)  SpO2: 95% (11 Jul 2022 05:01) (95% - 97%)    Parameters below as of 11 Jul 2022 05:01  Patient On (Oxygen Delivery Method): room air        PHYSICAL EXAM:    General: young female, lying comfortably in bed; in no acute distress  HEENT: MMM, conjunctiva and sclera clear  Gastrointestinal: Soft non-distended; tenderness to palpation in LLQ; No rebound or guarding  Skin: Warm and dry. No obvious rash    LABS:                        11.6   17.98 )-----------( 432      ( 11 Jul 2022 08:34 )             36.2     07-10    138  |  103  |  21  ----------------------------<  129<H>  4.4   |  28  |  0.70    Ca    8.6      10 Jul 2022 07:07  Phos  3.8     07-10  Mg     2.2     07-10    TPro  5.9<L>  /  Alb  3.4  /  TBili  0.2  /  DBili  x   /  AST  11  /  ALT  20  /  AlkPhos  76  07-10                      RADIOLOGY & ADDITIONAL STUDIES:  Reviewed

## 2022-07-11 NOTE — PROGRESS NOTE ADULT - SUBJECTIVE AND OBJECTIVE BOX
OVERNIGHT EVENTS: RENEE. Pt had 5 formed, non-bloody BM's throughout the entire duration of 7/10.     SUBJECTIVE: Pt reports some mild abdominal discomfort and urgency but overall states symptom improvement. Continues to tolerate diet. Denies proctitis, abdominal pain, N/V/D. Denies fever/chills/CP/SOB/palpitations.     VITAL SIGNS:  Vital Signs Last 24 Hrs  T(C): 36.6 (11 Jul 2022 05:01), Max: 36.6 (10 Jul 2022 20:52)  T(F): 97.8 (11 Jul 2022 05:01), Max: 97.8 (10 Jul 2022 20:52)  HR: 72 (11 Jul 2022 05:01) (72 - 78)  BP: 99/61 (11 Jul 2022 05:01) (99/61 - 106/71)  BP(mean): --  RR: 18 (11 Jul 2022 05:01) (18 - 18)  SpO2: 95% (11 Jul 2022 05:01) (95% - 97%)    Parameters below as of 11 Jul 2022 05:01  Patient On (Oxygen Delivery Method): room air      PHYSICAL EXAM:  General: Lying comfortably in bed; NAD; speaking in full sentences  HEENT: NC/AT; EOMI; MMM  Neck: supple  Cardiac: RRR; +S1/S2  Pulm: CTA B/L; no W/R/R  GI: Abd soft, nondistended. Very mild tenderness to deep palpation to LLQ. No tenderness to palpation to other quadrants. Large healed abdominal scar from previous hepatic resection from sarcoma when 15 y/o.  Extremities: no edema, clubbing or cyanosis  Vasc: 2+ radial pulses B/L  Neuro: AAOx3; no focal deficits    MEDICATIONS:  MEDICATIONS  (STANDING):  buPROPion XL (24-Hour) . 150 milliGRAM(s) Oral daily  clonazePAM  Tablet 0.25 milliGRAM(s) Oral every 24 hours  dextrose 5%. 1000 milliLiter(s) (100 mL/Hr) IV Continuous <Continuous>  dextrose 5%. 1000 milliLiter(s) (50 mL/Hr) IV Continuous <Continuous>  dextrose 50% Injectable 25 Gram(s) IV Push once  dextrose 50% Injectable 12.5 Gram(s) IV Push once  dextrose 50% Injectable 25 Gram(s) IV Push once  enoxaparin Injectable 30 milliGRAM(s) SubCutaneous every 24 hours  escitalopram 15 milliGRAM(s) Oral daily  glucagon  Injectable 1 milliGRAM(s) IntraMuscular once  insulin lispro (ADMELOG) corrective regimen sliding scale   SubCutaneous Before meals and at bedtime  pantoprazole    Tablet 40 milliGRAM(s) Oral before breakfast  predniSONE   Tablet 60 milliGRAM(s) Oral daily    MEDICATIONS  (PRN):  acetaminophen     Tablet .. 650 milliGRAM(s) Oral every 6 hours PRN Temp greater or equal to 38C (100.4F), Mild Pain (1 - 3)  aluminum hydroxide/magnesium hydroxide/simethicone Suspension 30 milliLiter(s) Oral every 4 hours PRN Dyspepsia  dextrose Oral Gel 15 Gram(s) Oral once PRN Blood Glucose LESS THAN 70 milliGRAM(s)/deciliter  melatonin 3 milliGRAM(s) Oral at bedtime PRN Insomnia  ondansetron Injectable 4 milliGRAM(s) IV Push every 8 hours PRN Nausea and/or Vomiting      ALLERGIES:  Allergies    penicillins (Hives)    Intolerances        LABS:                        11.6   17.98 )-----------( 432      ( 11 Jul 2022 08:34 )             36.2     07-11    135  |  98  |  20  ----------------------------<  110<H>  4.4   |  27  |  0.69    Ca    9.2      11 Jul 2022 08:34  Phos  4.1     07-11  Mg     2.3     07-11    TPro  5.9<L>  /  Alb  3.4  /  TBili  0.2  /  DBili  x   /  AST  11  /  ALT  20  /  AlkPhos  76  07-10        RADIOLOGY & ADDITIONAL TESTS: Reviewed.

## 2022-07-11 NOTE — PROGRESS NOTE ADULT - ASSESSMENT
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     #UC Flare  Patient presenting with several month history of bloody BMs, high frequency (15-20 episodes/day), with urgency, ~20 lb weight loss. With initial diagnosis of Crohn's disease on colonoscopy in 2012, was on Sulfasalazine 800 mg daily x 10 years with no symptoms, then starting in Feb 2022, noted to have frequent bloody BMs, refractory to budesonide, hydrocortisone enemas/mesalamine and more recently Prednisone 40 mg --> 60 mg daily.  Most recent colonoscopy in may 2022 suggestive of UC, noted to have davidson 2 disease. Fecal calprotectin 3449 (6/21/22), Cdiff/GI PCR negative as an outpatient. Unclear etiology for current flare up of symptoms.   -GI PCR & Cdiff negative  -Flexible Sigmoidoscopy (7/6): Continuous erythematous, edematous mucosa with exudates/ulceration, loss of vascularity and spontaneous bleeding, most prominent in rectum, consistent with DAVIDSON 3 disease. Advanced gastroscope to 25 cm before withdrawing scope. No retroflexion performed 2/2 erythema/edema (Biopsy). External hemorrhoids.       *Pathology: Sigmoid --> Colonic mucosa showing moderate active chronic inflammation with cryptitis; Rectum --> Rectal mucosa showing marked active chronic inflammation,cryptitis and reactive epithelial changes. No dyplasia identified. Awaiting CMV   - Continue SoluMedrol 20 mg q8h through the weekend.  - Transition to PO Pred 60 mg tomorrow, if symptom worsen on po steroids, will need to consider for rescue therapy with Remicade early next week  - c/w DVT ppx    Hollie Mcgee MD  Gastroenterology Fellow, PGY -5   Pager [phone]  [phone] Case discussed with [facility] attending and primary team.     Amber Kaur DO  Gastroenterology Fellow  Pager: [phone] PMHx sarcoma s/p resection, hx indeterminate colitis (diagnosed as Crohns in 2012 on colonoscopy, then more recently on 2022 colonoscopy with UC) presenting to ED with complaints of persistent bloody BMs, urgency for several months, refractory to PO steroids as an outpatient,  GI consulted for flare of UC.     #UC Flare  Patient presenting with several month history of bloody BMs, high frequency (15-20 episodes/day), with urgency, ~20 lb weight loss. With initial diagnosis of Crohn's disease on colonoscopy in 2012, was on Sulfasalazine 800 mg daily x 10 years with no symptoms, then starting in Feb 2022, noted to have frequent bloody BMs, refractory to budesonide, hydrocortisone enemas/mesalamine and more recently Prednisone 40 mg --> 60 mg daily.  Most recent colonoscopy in may 2022 suggestive of UC, noted to have davidson 2 disease. Fecal calprotectin 3449 (6/21/22), Cdiff/GI PCR negative as an outpatient. Unclear etiology for current flare up of symptoms.   -GI PCR & Cdiff negative  -Flexible Sigmoidoscopy (7/6): Continuous erythematous, edematous mucosa with exudates/ulceration, loss of vascularity and spontaneous bleeding, most prominent in rectum, consistent with DAVIDSON 3 disease. Advanced gastroscope to 25 cm before withdrawing scope. No retroflexion performed 2/2 erythema/edema (Biopsy). External hemorrhoids.       *Pathology: Sigmoid --> Colonic mucosa showing moderate active chronic inflammation with cryptitis; Rectum --> Rectal mucosa showing marked active chronic inflammation,cryptitis and reactive epithelial changes. No dyplasia identified. Awaiting CMV   - Clinically improving on SoluMedrol 20 mg q8h, now with non-bloody BMs, recommend initiating Prednisone 60 mg daily starting today (7/11) and monitor progress over the next 24 hrs. If continues to improve, will consider for outpatient initiation of Entyvio   - c/w DVT ppx    Case discussed with [facility] attending and primary team.     Amber Kaur DO  Gastroenterology Fellow  Pager: [phone] Case discussed with [facility] attending and primary team.     Amber Kaur DO  Gastroenterology Fellow  Pager: [phone] PMHx sarcoma s/p resection, hx indeterminate colitis (diagnosed as Crohns in 2012 on colonoscopy, then more recently on 2022 colonoscopy with UC) presenting to ED with complaints of persistent bloody BMs, urgency for several months, refractory to PO steroids as an outpatient,  GI consulted for flare of UC.     #UC Flare  Patient presenting with several month history of bloody BMs, high frequency (15-20 episodes/day), with urgency, ~20 lb weight loss. With initial diagnosis of Crohn's disease on colonoscopy in 2012, was on Sulfasalazine 800 mg daily x 10 years with no symptoms, then starting in Feb 2022, noted to have frequent bloody BMs, refractory to budesonide, hydrocortisone enemas/mesalamine and more recently Prednisone 40 mg --> 60 mg daily.  Most recent colonoscopy in may 2022 suggestive of UC, noted to have davidson 2 disease. Fecal calprotectin 3449 (6/21/22), Cdiff/GI PCR negative as an outpatient. Unclear etiology for current flare up of symptoms.   -GI PCR & Cdiff negative  -Flexible Sigmoidoscopy (7/6): Continuous erythematous, edematous mucosa with exudates/ulceration, loss of vascularity and spontaneous bleeding, most prominent in rectum, consistent with DAVIDSON 3 disease. Advanced gastroscope to 25 cm before withdrawing scope. No retroflexion performed 2/2 erythema/edema (Biopsy). External hemorrhoids.       *Pathology: Sigmoid --> Colonic mucosa showing moderate active chronic inflammation with cryptitis; Rectum --> Rectal mucosa showing marked active chronic inflammation,cryptitis and reactive epithelial changes. No dyplasia identified. Awaiting CMV   - Clinically improving on SoluMedrol 20 mg q8h, now with non-bloody BMs, recommend initiating Prednisone 60 mg daily starting today (7/11) and monitor progress over the next 24 hrs. If continues to improve, will consider for outpatient initiation of Entyvio   - c/w DVT ppx    Case discussed with Dr Roa and primary team.     Amber Kaur DO  Gastroenterology Fellow  Pager: [phone]

## 2022-07-11 NOTE — PROGRESS NOTE ADULT - PROBLEM SELECTOR PLAN 1
Hx of UC with last colonoscopy 2022. Previously, condition was well controlled with Sulfasalazine 800 mg daily x 10 years. However, of recent, pts condition refractory to budesonide, hydrocortisone enemas/mesalamine and more recently prednisone 60 mg. Home mesalamine held. 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 Tolerating regular diet. Original plan was to d/c IV steroids and begin oral prednisone with taper 07/08, but pt experienced increase in BMs and urgency with blood. 5 formed, non-bloody BM on 7/10.     Plan:   - GI following   - Initiating Prednisone 60 mg PO today with monitoring for symptom recurrence for 24 hrs   - Continue regular diet w/o dairy  -Consider outpatient Entyvio if symptoms improve

## 2022-07-11 NOTE — PROGRESS NOTE ADULT - ASSESSMENT
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

## 2022-07-11 NOTE — PROGRESS NOTE ADULT - PROBLEM SELECTOR PLAN 3
F: Tolerating PO   E: replete prn   N: Regular diet (no dairy)   DVT: Lovenox 30 mg    GI: PPI   Dispo: RMF

## 2022-07-12 ENCOUNTER — TRANSCRIPTION ENCOUNTER (OUTPATIENT)
Age: 32
End: 2022-07-12

## 2022-07-12 ENCOUNTER — NON-APPOINTMENT (OUTPATIENT)
Age: 32
End: 2022-07-12

## 2022-07-12 VITALS
SYSTOLIC BLOOD PRESSURE: 107 MMHG | TEMPERATURE: 98 F | HEART RATE: 91 BPM | OXYGEN SATURATION: 96 % | RESPIRATION RATE: 18 BRPM | DIASTOLIC BLOOD PRESSURE: 71 MMHG

## 2022-07-12 LAB
ANION GAP SERPL CALC-SCNC: 10 MMOL/L — SIGNIFICANT CHANGE UP (ref 5–17)
BASOPHILS # BLD AUTO: 0 K/UL — SIGNIFICANT CHANGE UP (ref 0–0.2)
BASOPHILS NFR BLD AUTO: 0 % — SIGNIFICANT CHANGE UP (ref 0–2)
BUN SERPL-MCNC: 22 MG/DL — SIGNIFICANT CHANGE UP (ref 7–23)
CALCIUM SERPL-MCNC: 9.2 MG/DL — SIGNIFICANT CHANGE UP (ref 8.4–10.5)
CHLORIDE SERPL-SCNC: 98 MMOL/L — SIGNIFICANT CHANGE UP (ref 96–108)
CO2 SERPL-SCNC: 29 MMOL/L — SIGNIFICANT CHANGE UP (ref 22–31)
CREAT SERPL-MCNC: 0.73 MG/DL — SIGNIFICANT CHANGE UP (ref 0.5–1.3)
CRP SERPL-MCNC: <3 MG/L — SIGNIFICANT CHANGE UP (ref 0–4)
EGFR: 112 ML/MIN/1.73M2 — SIGNIFICANT CHANGE UP
EOSINOPHIL # BLD AUTO: 0 K/UL — SIGNIFICANT CHANGE UP (ref 0–0.5)
EOSINOPHIL NFR BLD AUTO: 0 % — SIGNIFICANT CHANGE UP (ref 0–6)
GLUCOSE BLDC GLUCOMTR-MCNC: 116 MG/DL — HIGH (ref 70–99)
GLUCOSE BLDC GLUCOMTR-MCNC: 91 MG/DL — SIGNIFICANT CHANGE UP (ref 70–99)
GLUCOSE SERPL-MCNC: 93 MG/DL — SIGNIFICANT CHANGE UP (ref 70–99)
HCT VFR BLD CALC: 38.2 % — SIGNIFICANT CHANGE UP (ref 34.5–45)
HGB BLD-MCNC: 12 G/DL — SIGNIFICANT CHANGE UP (ref 11.5–15.5)
LYMPHOCYTES # BLD AUTO: 0.96 K/UL — LOW (ref 1–3.3)
LYMPHOCYTES # BLD AUTO: 5.3 % — LOW (ref 13–44)
MAGNESIUM SERPL-MCNC: 2.4 MG/DL — SIGNIFICANT CHANGE UP (ref 1.6–2.6)
MANUAL SMEAR VERIFICATION: SIGNIFICANT CHANGE UP
MCHC RBC-ENTMCNC: 25.5 PG — LOW (ref 27–34)
MCHC RBC-ENTMCNC: 31.4 GM/DL — LOW (ref 32–36)
MCV RBC AUTO: 81.3 FL — SIGNIFICANT CHANGE UP (ref 80–100)
MONOCYTES # BLD AUTO: 0.8 K/UL — SIGNIFICANT CHANGE UP (ref 0–0.9)
MONOCYTES NFR BLD AUTO: 4.4 % — SIGNIFICANT CHANGE UP (ref 2–14)
NEUTROPHILS # BLD AUTO: 16.43 K/UL — HIGH (ref 1.8–7.4)
NEUTROPHILS NFR BLD AUTO: 90.3 % — HIGH (ref 43–77)
OVALOCYTES BLD QL SMEAR: SLIGHT — SIGNIFICANT CHANGE UP
PHOSPHATE SERPL-MCNC: 3.7 MG/DL — SIGNIFICANT CHANGE UP (ref 2.5–4.5)
PLAT MORPH BLD: NORMAL — SIGNIFICANT CHANGE UP
PLATELET # BLD AUTO: 422 K/UL — HIGH (ref 150–400)
POLYCHROMASIA BLD QL SMEAR: SLIGHT — SIGNIFICANT CHANGE UP
POTASSIUM SERPL-MCNC: 4.4 MMOL/L — SIGNIFICANT CHANGE UP (ref 3.5–5.3)
POTASSIUM SERPL-SCNC: 4.4 MMOL/L — SIGNIFICANT CHANGE UP (ref 3.5–5.3)
RBC # BLD: 4.7 M/UL — SIGNIFICANT CHANGE UP (ref 3.8–5.2)
RBC # FLD: 14.6 % — HIGH (ref 10.3–14.5)
RBC BLD AUTO: ABNORMAL
SARS-COV-2 RNA SPEC QL NAA+PROBE: SIGNIFICANT CHANGE UP
SODIUM SERPL-SCNC: 137 MMOL/L — SIGNIFICANT CHANGE UP (ref 135–145)
WBC # BLD: 18.2 K/UL — HIGH (ref 3.8–10.5)
WBC # FLD AUTO: 18.2 K/UL — HIGH (ref 3.8–10.5)

## 2022-07-12 PROCEDURE — ZZZZZ: CPT

## 2022-07-12 PROCEDURE — 99239 HOSP IP/OBS DSCHRG MGMT >30: CPT | Mod: GC

## 2022-07-12 RX ORDER — BUPROPION HYDROCHLORIDE 150 MG/1
1 TABLET, EXTENDED RELEASE ORAL
Qty: 0 | Refills: 0 | DISCHARGE

## 2022-07-12 RX ORDER — BUPROPION HYDROCHLORIDE 150 MG/1
0 TABLET, EXTENDED RELEASE ORAL
Qty: 0 | Refills: 0 | DISCHARGE

## 2022-07-12 RX ORDER — ESCITALOPRAM OXALATE 10 MG/1
0 TABLET, FILM COATED ORAL
Qty: 0 | Refills: 0 | DISCHARGE

## 2022-07-12 RX ORDER — ESCITALOPRAM OXALATE 10 MG/1
1 TABLET, FILM COATED ORAL
Qty: 0 | Refills: 0 | DISCHARGE

## 2022-07-12 RX ADMIN — ENOXAPARIN SODIUM 30 MILLIGRAM(S): 100 INJECTION SUBCUTANEOUS at 11:25

## 2022-07-12 RX ADMIN — PANTOPRAZOLE SODIUM 40 MILLIGRAM(S): 20 TABLET, DELAYED RELEASE ORAL at 06:45

## 2022-07-12 RX ADMIN — ESCITALOPRAM OXALATE 15 MILLIGRAM(S): 10 TABLET, FILM COATED ORAL at 11:25

## 2022-07-12 RX ADMIN — Medication 60 MILLIGRAM(S): at 06:45

## 2022-07-12 NOTE — PROGRESS NOTE ADULT - SUBJECTIVE AND OBJECTIVE BOX
GASTROENTEROLOGY PROGRESS NOTE  Patient seen and examined at bedside.  -Transitioned to Prednisone 60 mg daily yesterday (7/11), noting 3 BMs during the day, 0 BMs overnight    ROS: Patient reports 3 BMs during the day, 0 BMs overnight, all non-bloody. No urgency. Notes the BMs to be bulkier and more well formed. NO nausea/vomiting, abdominal pain.       PERTINENT REVIEW OF SYSTEMS:  CONSTITUTIONAL: No weakness, fevers or chills  HEENT: No visual changes; No vertigo or throat pain   GASTROINTESTINAL: As above.  NEUROLOGICAL: No numbness or weakness  SKIN: No itching, burning, rashes, or lesions     Allergies    penicillins (Hives)    Intolerances      MEDICATIONS:  MEDICATIONS  (STANDING):  buPROPion XL (24-Hour) . 150 milliGRAM(s) Oral daily  clonazePAM  Tablet 0.25 milliGRAM(s) Oral every 24 hours  dextrose 5%. 1000 milliLiter(s) (100 mL/Hr) IV Continuous <Continuous>  dextrose 5%. 1000 milliLiter(s) (50 mL/Hr) IV Continuous <Continuous>  dextrose 50% Injectable 25 Gram(s) IV Push once  dextrose 50% Injectable 12.5 Gram(s) IV Push once  dextrose 50% Injectable 25 Gram(s) IV Push once  enoxaparin Injectable 30 milliGRAM(s) SubCutaneous every 24 hours  escitalopram 15 milliGRAM(s) Oral daily  glucagon  Injectable 1 milliGRAM(s) IntraMuscular once  insulin lispro (ADMELOG) corrective regimen sliding scale   SubCutaneous Before meals and at bedtime  pantoprazole    Tablet 40 milliGRAM(s) Oral before breakfast  predniSONE   Tablet 60 milliGRAM(s) Oral daily    MEDICATIONS  (PRN):  acetaminophen     Tablet .. 650 milliGRAM(s) Oral every 6 hours PRN Temp greater or equal to 38C (100.4F), Mild Pain (1 - 3)  aluminum hydroxide/magnesium hydroxide/simethicone Suspension 30 milliLiter(s) Oral every 4 hours PRN Dyspepsia  dextrose Oral Gel 15 Gram(s) Oral once PRN Blood Glucose LESS THAN 70 milliGRAM(s)/deciliter  melatonin 3 milliGRAM(s) Oral at bedtime PRN Insomnia  ondansetron Injectable 4 milliGRAM(s) IV Push every 8 hours PRN Nausea and/or Vomiting    Vital Signs Last 24 Hrs  T(C): 37.6 (12 Jul 2022 05:50), Max: 37.6 (12 Jul 2022 05:50)  T(F): 99.6 (12 Jul 2022 05:50), Max: 99.6 (12 Jul 2022 05:50)  HR: 70 (12 Jul 2022 05:50) (70 - 94)  BP: 133/69 (12 Jul 2022 05:50) (98/65 - 133/69)  BP(mean): --  RR: 18 (12 Jul 2022 05:50) (18 - 18)  SpO2: 96% (12 Jul 2022 05:50) (95% - 97%)    Parameters below as of 12 Jul 2022 05:50  Patient On (Oxygen Delivery Method): room air        PHYSICAL EXAM:    General: young female, lying comfortably in bed; in no acute distress  HEENT: MMM, conjunctiva and sclera clear  Gastrointestinal: Soft non-distended; non-tender; No rebound or guarding  Skin: Warm and dry. No obvious rash    LABS:                        12.0   18.20 )-----------( 422      ( 12 Jul 2022 09:21 )             38.2     07-12    137  |  98  |  22  ----------------------------<  93  4.4   |  29  |  0.73    Ca    9.2      12 Jul 2022 09:21  Phos  3.7     07-12  Mg     2.4     07-12                        RADIOLOGY & ADDITIONAL STUDIES:  Reviewed

## 2022-07-12 NOTE — PROGRESS NOTE ADULT - PROBLEM SELECTOR PROBLEM 1
Exacerbation of ulcerative colitis without complication

## 2022-07-12 NOTE — DISCHARGE NOTE NURSING/CASE MANAGEMENT/SOCIAL WORK - NSDCFUADDAPPT_GEN_ALL_CORE_FT
(1) Please follow up with your Primary Care Provider, Dr. Carolyn Franco via televisit on 07/18/2022 at 2:00pm.    The office will confirm this appointment.    Appointment was scheduled by Ms. TIFFANI Barton, Referral Coordinator.

## 2022-07-12 NOTE — DISCHARGE NOTE NURSING/CASE MANAGEMENT/SOCIAL WORK - NSDCPEFALRISK_GEN_ALL_CORE
For information on Fall & Injury Prevention, visit: [email or web address] OR  [email or web address] OR  https://www.cdc.gov/steadi/patient.html

## 2022-07-12 NOTE — PROGRESS NOTE ADULT - PROVIDER SPECIALTY LIST ADULT
Gastroenterology
Internal Medicine
Gastroenterology
Internal Medicine

## 2022-07-12 NOTE — PROGRESS NOTE ADULT - ATTENDING COMMENTS
31 yo F w/ PMHx sarcoma s/p resection and hx of indeterminate colitis, diagnosed as Crohns in 2012, then changed to UC on recent colonoscopy '22 p/w bloody BMs, urgency for several months, refractory to PO steroids as an outpatient consistent with flare of indeterminate colitis. Patient clinically improving on IV solumedrol 20 mg q8h, w/ negative stool studies.  Flex sig up to sigmoid colon notable for Edwards 3 disease; however, patient with clinical improvement.  Will trial PO steroid with plan for outpatient evaluation for biologic therapy if continues to improve on 24h of PO steroid.
Patient reports continue clinical improvement and was ambulating in hallway. Stool frequency and urgency decreasing with resolution of bloody BM. Continue solumedrol and monitor BM.
Patient seen and discussed with fellow plan as noted above
Indeterminate colitis flare with continue improvement with solumedrol.  Patient reports overall improvement in sx and 4 BM over 24 hours.  Continue to monitor on solumedrol and followup path on CMV.
Patient seen and discussed with fellow plan as noted above
Patient with increased stool frequency and bloody output yesterday, following transition to PO steroid. She is now on IV steroid with overall improvement in symptoms.  Will plan for observe on IV steroid over the weekend.  If sx persist or worsens over the weekend, will plan for IFX next week
Hematochezia due to UC Flare without signs of sepsis - s/p flex sigmoidoscopy , symptoms worsening after transition to PO steroids , IV steroids restarted , f/u GI recs , if clinically patient does not improve she may need Infliximab
Hematochezia due to UC Flare without signs of sepsis - s/p flex sigmoidoscopy , symptoms worsening after transition to PO steroids , IV steroids restarted ,PO steroids restarted today
Hematochezia due to UC Flare without signs of sepsis - s/p flex sigmoidoscopy , symptoms worsening after transition to PO steroids , IV steroids restarted , f/u GI recs , if clinically patient does not improve she may need Infliximab.
UC Flare without signs of sepsis - s/p flex sigmoidoscopy , transition to po prednisone today , potential dc in am if symptoms stable
Discharge home on Prednisone taper and GI appointment
UC Flare without signs of sepsis - continue solumedrol . s/p flex sigmoidoscopy

## 2022-07-12 NOTE — PROGRESS NOTE ADULT - SUBJECTIVE AND OBJECTIVE BOX
** INCOMPLETE **    OVERNIGHT EVENTS:     SUBJECTIVE:    VITAL SIGNS:  Vital Signs Last 24 Hrs  T(C): 37.6 (12 Jul 2022 05:50), Max: 37.6 (12 Jul 2022 05:50)  T(F): 99.6 (12 Jul 2022 05:50), Max: 99.6 (12 Jul 2022 05:50)  HR: 70 (12 Jul 2022 05:50) (70 - 94)  BP: 133/69 (12 Jul 2022 05:50) (98/65 - 133/69)  BP(mean): --  RR: 18 (12 Jul 2022 05:50) (18 - 18)  SpO2: 96% (12 Jul 2022 05:50) (95% - 97%)    Parameters below as of 12 Jul 2022 05:50  Patient On (Oxygen Delivery Method): room air        PHYSICAL EXAM:  General: NAD; speaking in full sentences  HEENT: NC/AT; PERRL; EOMI; MMM  Neck: supple; no JVD  Cardiac: RRR; +S1/S2  Pulm: CTA B/L; no W/R/R  GI: soft, NT/ND, +BS  Extremities: WWP; no edema, clubbing or cyanosis  Vasc: 2+ radial, DP pulses B/L  Neuro: AAOx3; no focal deficits    MEDICATIONS:  MEDICATIONS  (STANDING):  buPROPion XL (24-Hour) . 150 milliGRAM(s) Oral daily  clonazePAM  Tablet 0.25 milliGRAM(s) Oral every 24 hours  dextrose 5%. 1000 milliLiter(s) (100 mL/Hr) IV Continuous <Continuous>  dextrose 5%. 1000 milliLiter(s) (50 mL/Hr) IV Continuous <Continuous>  dextrose 50% Injectable 25 Gram(s) IV Push once  dextrose 50% Injectable 12.5 Gram(s) IV Push once  dextrose 50% Injectable 25 Gram(s) IV Push once  enoxaparin Injectable 30 milliGRAM(s) SubCutaneous every 24 hours  escitalopram 15 milliGRAM(s) Oral daily  glucagon  Injectable 1 milliGRAM(s) IntraMuscular once  insulin lispro (ADMELOG) corrective regimen sliding scale   SubCutaneous Before meals and at bedtime  pantoprazole    Tablet 40 milliGRAM(s) Oral before breakfast  predniSONE   Tablet 60 milliGRAM(s) Oral daily    MEDICATIONS  (PRN):  acetaminophen     Tablet .. 650 milliGRAM(s) Oral every 6 hours PRN Temp greater or equal to 38C (100.4F), Mild Pain (1 - 3)  aluminum hydroxide/magnesium hydroxide/simethicone Suspension 30 milliLiter(s) Oral every 4 hours PRN Dyspepsia  dextrose Oral Gel 15 Gram(s) Oral once PRN Blood Glucose LESS THAN 70 milliGRAM(s)/deciliter  melatonin 3 milliGRAM(s) Oral at bedtime PRN Insomnia  ondansetron Injectable 4 milliGRAM(s) IV Push every 8 hours PRN Nausea and/or Vomiting      ALLERGIES:  Allergies    penicillins (Hives)    Intolerances        LABS:                        11.6   17.98 )-----------( 432      ( 11 Jul 2022 08:34 )             36.2     07-11    135  |  98  |  20  ----------------------------<  110<H>  4.4   |  27  |  0.69    Ca    9.2      11 Jul 2022 08:34  Phos  4.1     07-11  Mg     2.3     07-11    TPro  5.9<L>  /  Alb  3.4  /  TBili  0.2  /  DBili  x   /  AST  11  /  ALT  20  /  AlkPhos  76  07-10        RADIOLOGY & ADDITIONAL TESTS: Reviewed. OVERNIGHT EVENTS: No acute over night events    SUBJECTIVE: Reports feeling much better, symptoms have improved. She had 3 nonbloody formed bowel movements yesterday and non overnight. No abdominal pain. Tolerating oral diet. ROS negative.     VITAL SIGNS:  Vital Signs Last 24 Hrs  T(C): 37.6 (12 Jul 2022 05:50), Max: 37.6 (12 Jul 2022 05:50)  T(F): 99.6 (12 Jul 2022 05:50), Max: 99.6 (12 Jul 2022 05:50)  HR: 70 (12 Jul 2022 05:50) (70 - 94)  BP: 133/69 (12 Jul 2022 05:50) (98/65 - 133/69)  BP(mean): --  RR: 18 (12 Jul 2022 05:50) (18 - 18)  SpO2: 96% (12 Jul 2022 05:50) (95% - 97%)    Parameters below as of 12 Jul 2022 05:50  Patient On (Oxygen Delivery Method): room air        PHYSICAL EXAM:  General: NAD; speaking in full sentences  HEENT: NC/AT; PERRL; EOMI; MMM  Neck: supple; no JVD  Cardiac: RRR; +S1/S2  Pulm: CTA B/L; no W/R/R  GI: soft, NT/ND, +BS  Extremities: WWP; no edema, clubbing or cyanosis  Vasc: 2+ radial, DP pulses B/L  Neuro: AAOx3; no focal deficits    MEDICATIONS:  MEDICATIONS  (STANDING):  buPROPion XL (24-Hour) . 150 milliGRAM(s) Oral daily  clonazePAM  Tablet 0.25 milliGRAM(s) Oral every 24 hours  dextrose 5%. 1000 milliLiter(s) (100 mL/Hr) IV Continuous <Continuous>  dextrose 5%. 1000 milliLiter(s) (50 mL/Hr) IV Continuous <Continuous>  dextrose 50% Injectable 25 Gram(s) IV Push once  dextrose 50% Injectable 12.5 Gram(s) IV Push once  dextrose 50% Injectable 25 Gram(s) IV Push once  enoxaparin Injectable 30 milliGRAM(s) SubCutaneous every 24 hours  escitalopram 15 milliGRAM(s) Oral daily  glucagon  Injectable 1 milliGRAM(s) IntraMuscular once  insulin lispro (ADMELOG) corrective regimen sliding scale   SubCutaneous Before meals and at bedtime  pantoprazole    Tablet 40 milliGRAM(s) Oral before breakfast  predniSONE   Tablet 60 milliGRAM(s) Oral daily    MEDICATIONS  (PRN):  acetaminophen     Tablet .. 650 milliGRAM(s) Oral every 6 hours PRN Temp greater or equal to 38C (100.4F), Mild Pain (1 - 3)  aluminum hydroxide/magnesium hydroxide/simethicone Suspension 30 milliLiter(s) Oral every 4 hours PRN Dyspepsia  dextrose Oral Gel 15 Gram(s) Oral once PRN Blood Glucose LESS THAN 70 milliGRAM(s)/deciliter  melatonin 3 milliGRAM(s) Oral at bedtime PRN Insomnia  ondansetron Injectable 4 milliGRAM(s) IV Push every 8 hours PRN Nausea and/or Vomiting      ALLERGIES:  Allergies    penicillins (Hives)    Intolerances        LABS:                        11.6   17.98 )-----------( 432      ( 11 Jul 2022 08:34 )             36.2     07-11    135  |  98  |  20  ----------------------------<  110<H>  4.4   |  27  |  0.69    Ca    9.2      11 Jul 2022 08:34  Phos  4.1     07-11  Mg     2.3     07-11    TPro  5.9<L>  /  Alb  3.4  /  TBili  0.2  /  DBili  x   /  AST  11  /  ALT  20  /  AlkPhos  76  07-10        RADIOLOGY & ADDITIONAL TESTS: Reviewed.

## 2022-07-12 NOTE — DISCHARGE NOTE NURSING/CASE MANAGEMENT/SOCIAL WORK - PATIENT PORTAL LINK FT
You can access the FollowMyHealth Patient Portal offered by [facility] by registering at the following website: [email or web address]. By joining [facility]’s FollowMyHealth portal, you will also be able to view your health information using other applications (apps) compatible with our system.

## 2022-07-12 NOTE — PROGRESS NOTE ADULT - ASSESSMENT
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     #UC Flare  Patient presenting with several month history of bloody BMs, high frequency (15-20 episodes/day), with urgency, ~20 lb weight loss. With initial diagnosis of Crohn's disease on colonoscopy in 2012, was on Sulfasalazine 800 mg daily x 10 years with no symptoms, then starting in Feb 2022, noted to have frequent bloody BMs, refractory to budesonide, hydrocortisone enemas/mesalamine and more recently Prednisone 40 mg --> 60 mg daily.  Most recent colonoscopy in may 2022 suggestive of UC, noted to have davidson 2 disease. Fecal calprotectin 3449 (6/21/22), Cdiff/GI PCR negative as an outpatient. Unclear etiology for current flare up of symptoms.   -GI PCR & Cdiff negative  -Flexible Sigmoidoscopy (7/6): Continuous erythematous, edematous mucosa with exudates/ulceration, loss of vascularity and spontaneous bleeding, most prominent in rectum, consistent with DAVIDSON 3 disease. Advanced gastroscope to 25 cm before withdrawing scope. No retroflexion performed 2/2 erythema/edema (Biopsy). External hemorrhoids.       *Pathology: Sigmoid --> Colonic mucosa showing moderate active chronic inflammation with cryptitis; Rectum --> Rectal mucosa showing marked active chronic inflammation,cryptitis and reactive epithelial changes. No dyplasia identified. Awaiting CMV   - c/w Prednisone 60 mg daily (started 7/11), tolerating well with improvement in BM frequency/consistency (non-bloody), resolution of urgency and abdominal pain. If discharged today, can continue with Prednisone 60mg daily and taper by 5mg every 7 days. If with recurrence of sxs or flare up of sxs, instruct patient to increase dose to previously tolerated dose. Will plan for outpatient initiation of Entyvio   - c/w DVT ppx while inpatient    Case discussed with Dr Roa and primary team.     Amber Kaur DO  Gastroenterology Fellow  Pager: [phone]

## 2022-07-12 NOTE — PROGRESS NOTE ADULT - PROBLEM SELECTOR PLAN 1
Hx of UC with last colonoscopy 2022. Previously, condition was well controlled with Sulfasalazine 800 mg daily x 10 years. However, of recent, pts condition refractory to budesonide, hydrocortisone enemas/mesalamine and more recently prednisone 60 mg. Home mesalamine held. 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 Tolerating regular diet. Original plan was to d/c IV steroids and begin oral prednisone with taper 07/08, but pt experienced increase in BMs and urgency with blood. 5 formed, non-bloody BM on 7/10.     Plan:   - GI following   - Initiating Prednisone 60 mg PO today with monitoring for symptom recurrence for 24 hrs   - Continue regular diet w/o dairy  -Consider outpatient Entyvio if symptoms improve Hx of UC with last colonoscopy 2022. Previously, condition was well controlled with Sulfasalazine 800 mg daily x 10 years. However, of recent, pts condition refractory to budesonide, hydrocortisone enemas/mesalamine and more recently prednisone 60 mg. Home mesalamine held. 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 Tolerating regular diet. Original plan was to d/c IV steroids and begin oral prednisone with taper 07/08, but pt experienced increase in BMs and urgency with blood and was restarted on IV steroids. IV steroids d/c after 48 hrs and she was started on oral prednisone 60 mg on 7/12, which she has been tolerating well.     Plan:   - GI following   - c/w Prednisone 60 mg PO today, which will be taken outpatient  - Continue regular diet w/o dairy  - Consider outpatient Entyvio if symptoms improve

## 2022-07-12 NOTE — PROGRESS NOTE ADULT - PROBLEM SELECTOR PLAN 2
Well controlled, not currently anxious.     Plan:   - c/w home escitalopram 15mg daily   - c/w home 0.25mg clonazepam nightly
Well controlled, not currently anxious.     Plan:   - c/w home escitalopram 15mg daily   - c/w home 0.25mg clonazepam nightly
Well controlled, not currently anxious.   - c/w home escitalopram 15mg daily   - c/w home 0.25mg clonazepam nightly
Well controlled, not currently anxious.   - c/w home escitalopram 15mg daily   - c/w home 0.25mg clonazepam nightly
Well controlled, not currently anxious.     Plan:   - c/w home escitalopram 15mg daily   - c/w home 0.25mg clonazepam nightly
Well controlled, not currently anxious.     Plan:   - c/w home escitalopram 15mg daily   - c/w home 0.25mg clonazepam nightly
Well controlled, not currently anxious.   - c/w home escitalopram 15mg daily   - c/w home 0.25mg clonazepam nightly

## 2022-07-13 ENCOUNTER — TRANSCRIPTION ENCOUNTER (OUTPATIENT)
Age: 32
End: 2022-07-13

## 2022-07-14 ENCOUNTER — TRANSCRIPTION ENCOUNTER (OUTPATIENT)
Age: 32
End: 2022-07-14

## 2022-07-14 RX ORDER — VEDOLIZUMAB 108 MG/.68ML
300 INJECTION, SOLUTION SUBCUTANEOUS ONCE
Refills: 0 | Status: COMPLETED | OUTPATIENT
Start: 2022-08-26 | End: 2022-08-26

## 2022-07-15 ENCOUNTER — APPOINTMENT (OUTPATIENT)
Dept: GASTROENTEROLOGY | Facility: CLINIC | Age: 32
End: 2022-07-15

## 2022-07-15 ENCOUNTER — OUTPATIENT (OUTPATIENT)
Dept: OUTPATIENT SERVICES | Facility: HOSPITAL | Age: 32
LOS: 1 days | End: 2022-07-15
Payer: MEDICAID

## 2022-07-15 ENCOUNTER — APPOINTMENT (OUTPATIENT)
Dept: INFUSION THERAPY | Facility: CLINIC | Age: 32
End: 2022-07-15

## 2022-07-15 VITALS
HEART RATE: 105 BPM | DIASTOLIC BLOOD PRESSURE: 70 MMHG | BODY MASS INDEX: 25.9 KG/M2 | RESPIRATION RATE: 14 BRPM | TEMPERATURE: 96.3 F | OXYGEN SATURATION: 98 % | WEIGHT: 165 LBS | HEIGHT: 67 IN | SYSTOLIC BLOOD PRESSURE: 110 MMHG

## 2022-07-15 VITALS
OXYGEN SATURATION: 98 % | HEART RATE: 89 BPM | DIASTOLIC BLOOD PRESSURE: 71 MMHG | TEMPERATURE: 98 F | SYSTOLIC BLOOD PRESSURE: 105 MMHG

## 2022-07-15 VITALS
OXYGEN SATURATION: 98 % | DIASTOLIC BLOOD PRESSURE: 69 MMHG | HEART RATE: 100 BPM | TEMPERATURE: 98 F | RESPIRATION RATE: 18 BRPM | SYSTOLIC BLOOD PRESSURE: 104 MMHG

## 2022-07-15 DIAGNOSIS — K51.90 ULCERATIVE COLITIS, UNSPECIFIED, W/OUT COMPLICATIONS: ICD-10-CM

## 2022-07-15 DIAGNOSIS — K52.3 INDETERMINATE COLITIS: ICD-10-CM

## 2022-07-15 LAB
ALBUMIN SERPL ELPH-MCNC: 3.7 G/DL — SIGNIFICANT CHANGE UP (ref 3.3–5)
ALP SERPL-CCNC: 69 U/L — SIGNIFICANT CHANGE UP (ref 40–120)
ALT FLD-CCNC: 24 U/L — SIGNIFICANT CHANGE UP (ref 10–45)
ANION GAP SERPL CALC-SCNC: 11 MMOL/L — SIGNIFICANT CHANGE UP (ref 5–17)
AST SERPL-CCNC: 17 U/L — SIGNIFICANT CHANGE UP (ref 10–40)
BILIRUB SERPL-MCNC: 0.3 MG/DL — SIGNIFICANT CHANGE UP (ref 0.2–1.2)
BUN SERPL-MCNC: 17 MG/DL — SIGNIFICANT CHANGE UP (ref 7–23)
CALCIUM SERPL-MCNC: 8.9 MG/DL — SIGNIFICANT CHANGE UP (ref 8.4–10.5)
CHLORIDE SERPL-SCNC: 105 MMOL/L — SIGNIFICANT CHANGE UP (ref 96–108)
CO2 SERPL-SCNC: 27 MMOL/L — SIGNIFICANT CHANGE UP (ref 22–31)
CREAT SERPL-MCNC: 0.79 MG/DL — SIGNIFICANT CHANGE UP (ref 0.5–1.3)
CRP SERPL-MCNC: <3 MG/L — SIGNIFICANT CHANGE UP (ref 0–4)
EGFR: 102 ML/MIN/1.73M2 — SIGNIFICANT CHANGE UP
GLUCOSE SERPL-MCNC: 107 MG/DL — HIGH (ref 70–99)
HCT VFR BLD CALC: 33.8 % — LOW (ref 34.5–45)
HGB BLD-MCNC: 11 G/DL — LOW (ref 11.5–15.5)
MCHC RBC-ENTMCNC: 26.3 PG — LOW (ref 27–34)
MCHC RBC-ENTMCNC: 32.5 GM/DL — SIGNIFICANT CHANGE UP (ref 32–36)
MCV RBC AUTO: 80.9 FL — SIGNIFICANT CHANGE UP (ref 80–100)
NRBC # BLD: 0 /100 WBCS — SIGNIFICANT CHANGE UP (ref 0–0)
PLATELET # BLD AUTO: 401 K/UL — HIGH (ref 150–400)
POTASSIUM SERPL-MCNC: 3.4 MMOL/L — LOW (ref 3.5–5.3)
POTASSIUM SERPL-SCNC: 3.4 MMOL/L — LOW (ref 3.5–5.3)
PROT SERPL-MCNC: 6.2 G/DL — SIGNIFICANT CHANGE UP (ref 6–8.3)
RBC # BLD: 4.18 M/UL — SIGNIFICANT CHANGE UP (ref 3.8–5.2)
RBC # FLD: 14.9 % — HIGH (ref 10.3–14.5)
SODIUM SERPL-SCNC: 143 MMOL/L — SIGNIFICANT CHANGE UP (ref 135–145)
WBC # BLD: 14.68 K/UL — HIGH (ref 3.8–10.5)
WBC # FLD AUTO: 14.68 K/UL — HIGH (ref 3.8–10.5)

## 2022-07-15 PROCEDURE — 99214 OFFICE O/P EST MOD 30 MIN: CPT | Mod: GC

## 2022-07-15 PROCEDURE — 85027 COMPLETE CBC AUTOMATED: CPT

## 2022-07-15 PROCEDURE — 86140 C-REACTIVE PROTEIN: CPT

## 2022-07-15 PROCEDURE — 80053 COMPREHEN METABOLIC PANEL: CPT

## 2022-07-15 PROCEDURE — 96413 CHEMO IV INFUSION 1 HR: CPT

## 2022-07-15 PROCEDURE — 36415 COLL VENOUS BLD VENIPUNCTURE: CPT

## 2022-07-15 RX ORDER — VEDOLIZUMAB 108 MG/.68ML
300 INJECTION, SOLUTION SUBCUTANEOUS ONCE
Refills: 0 | Status: COMPLETED | OUTPATIENT
Start: 2022-07-15 | End: 2022-07-15

## 2022-07-15 RX ADMIN — VEDOLIZUMAB 500 MILLIGRAM(S): 108 INJECTION, SOLUTION SUBCUTANEOUS at 13:32

## 2022-07-17 NOTE — HISTORY OF PRESENT ILLNESS
[de-identified] : 33 yo F [facility] liver cancer at age 16, history of indeterminate colitis (initially diagnosed with CD and later was told diagnosis was UC; previously on sulfasalazine for many years, recently with flare and started on budesonide with minimal response; more recently on mesalamine and hydrocortisone suppositories with minimal improvement) with no response, recent hospitalization to [facility] (7/5-7/12) for UC flare, improved with IV steroids, now here for follow up prior to first loading dose of Entyvio.\par \par Since her last appt, she was admitted to [facility] from 7/5-7/12/22 for persistent, frequent bloody BMs (15-20/day), refractory to PO steroids. GI PCR/Cdiff testing negative. s/p Flexible Sigmoidoscopy (7/6) demonstrating continuous erythematous, edematous mucosa with exudates/ulceration, loss of vascularity and spontaneous bleeding, most prominent in rectum, consistent with EDWARDS 3 disease. Advanced gastroscope to 25 cm before withdrawing scope. No retroflexion performed 2/2 erythema/edema (Biopsy). External hemorrhoids. Pathology demonstrating moderate active chronic inflammation with cryptitis in the rectum and sigmoid. Started on IV SoluMedrol 20 mg q8h while inpatient, with improvement in bowel movement frequency and resolution of BRBPR. Discharged home on Prednisone 60 mg daily. \par \par Notes since her discharge home on 7/12, she only has 1-2 formed, non-bloody BMs during the day, no longer with BMs at night. Urgency has since resolved. Still with some lingering LLQ tenderness with deep palpation but otherwise significantly improved. Arthralgias resolving. Remainder of ROS negative.  \par \par 6/20/22:\par 10 yrs ago had bleeding, no urgency. Had a colonoscopy and said it was CD. \par For 10 yrs until this Feb was on sulfasalazine 800mg. (had 'chill crohns') and occasional bleeding and would doa canasa suppository and would get better. \par \par in Feb started having blood with every BM. Was having urgency and still had bleeding every time. Had a colonoscopy on May 9th and told it was UC at [facility] with Dr. Geronimo Obrien (Edwards 2 disease activity). Was on budesonide and hydrocortisone enemas with no improvement for piror to colonoscopy. During the day bad, but could put it in at night and felt ok. Felt depleted and usually could still take subway and live normal life. After colonoscopy Switched her to mesalamine 0.375 2 pills twice a day, continued hydrocortisone enemas. But got to thep oint where put enema in and it wouldn't stay in. Stopped that but stayed on mesalamine. \par \par Urgency getting worse. For the past 2 week going 20x/day. Waking up at night. \par Has no energy. Lying down all day and is in the bathroom. Can't live normally. \par \par She has never been told she had a fistula or a stricture. \par Crohns diagnosed at [facility] with Dr. Ramires (was scattered inflammation throughout). He wrote down diagnosis of UC however. \par \par Since Feb has been having blood every time. \par Recently over the past 2 weeks having tons of BMs, even worse. \par \par Had a period waking up at night feeling feverish. Stopped getting that. Also having "sore eyes". No anterior uveitis, joint pains, rashes. \par \par Has never been on prednisone in the past. \par \par Started cholestyramine because of diarrhea. # Bms went up, did not help. \par \par no abdominal pain right now. Sometimes very uncomfortable feeling. \par Feels queasy often but never thrown up. \par Will oftenonly want to eat pretzels. \par \par Has lost 10-15 lbs since Feb 2022. the past few weeks weightloss accelerated. \par \par BMs are loose but brown. \par Sometimes a little mcuus, sometimes when she wipes sees mucus. 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

## 2022-07-17 NOTE — ASSESSMENT
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; previously on sulfasalazine for many years, recently with flare and started on budesonide with minimal response; more recently on mesalamine and hydrocortisone suppositories with minimal improvement) with no response, recent hospitalization to [facility] (7/5-7/12) for UC flare, improved with IV steroids, now here for follow up prior to first loading dose of Entyvio.\par \par #Indeterminate Colitis\par Patient history of indeterminate colitis, initially diagnosed as CD, then later told it was UC, previously on sulfasalazine for many years, recently with flare and started on budesonide with minimal response; more recently on mesalamine and hydrocortisone suppositories with minimal improvement) with no response, recent hospitalization to [facility] (7/5-7/12) for UC flare, 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 (7/6), improved with IV steroids, now here for follow up prior to first loading dose of Entyvio.\par -request records from [facility] re: prior work up, clinic notes to get clarity on prior diagnosis of CD vs UC, as well as recent colonoscopy on May 9th results (per patient c/w 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)\par - Can consider CTE/MRE for small bowel evaluation in the future (per patient no prior small bowel dedicated imaging or vce done)\par - First loading dose of VDZ scheduled for today (particularly in light of prior sarcoma, though it was long time ago), If non-responder to VDZ can consider for UST, followed by Infliximab in the future\par - We discussed increased risk of infections (bacterial, fungal, viral) which we will mitigate by immunizing in appropriate fashion (HBV,zoster, flu, pneumovax); risk of malignancy with time spent on lymphoma (HSTCL in young adults) and skin cancers (need for derm annual eval); risk of liver injury, bone marrow suppression, CNS disorders, allergic and infusion (if IV admin) reactions, idiosyncratic skin reactions, joint problems, among other rare and unusual manifestations. We discussed the need to notify if fevers or major illness prior to infusions, and the need to maintain follow up and obtain request labs and evaluations. In addition, we have already discusses resources such as [facility] and the manufacturers "patients" page to obtain additional detailed information on the medication.\par -Ordered for DEXA scan\par -Advised to slowly taper Prednisone by 5 mg weekly, currently on 60 mg daily while receiving loading dose of Entyvio. Recommended that she go back to previous dose if experiences flare up of symptoms \par \par RTC on 8/17 for follow up\par \par Amber Kaur, \par Gastroenterology Fellow\par \par \par

## 2022-07-17 NOTE — REVIEW OF SYSTEMS
[Negative] : Heme/Lymph [Abdominal Pain] : abdominal pain [As Noted in HPI] : as noted in HPI [Vomiting] : no vomiting [Constipation] : no constipation [Diarrhea] : no diarrhea [Heartburn] : no heartburn [Melena] : no melena

## 2022-07-17 NOTE — PHYSICAL EXAM
[General Appearance - Alert] : alert [General Appearance - In No Acute Distress] : in no acute distress [Sclera] : the sclera and conjunctiva were normal [PERRL With Normal Accommodation] : pupils were equal in size, round, and reactive to light [Extraocular Movements] : extraocular movements were intact [Outer Ear] : the ears and nose were normal in appearance [Oropharynx] : the oropharynx was normal [Neck Appearance] : the appearance of the neck was normal [Neck Cervical Mass (___cm)] : no neck mass was observed [Jugular Venous Distention Increased] : there was no jugular-venous distention [Thyroid Diffuse Enlargement] : the thyroid was not enlarged [Thyroid Nodule] : there were no palpable thyroid nodules [Exaggerated Use Of Accessory Muscles For Inspiration] : no accessory muscle use [Full Pulse] : the pedal pulses are present [Edema] : there was no peripheral edema [Bowel Sounds] : normal bowel sounds [Abdomen Soft] : soft [Abdomen Tenderness] : non-tender [Abdomen Mass (___ Cm)] : no abdominal mass palpated [Cervical Lymph Nodes Enlarged Posterior Bilaterally] : posterior cervical [Cervical Lymph Nodes Enlarged Anterior Bilaterally] : anterior cervical [Supraclavicular Lymph Nodes Enlarged Bilaterally] : supraclavicular [Axillary Lymph Nodes Enlarged Bilaterally] : axillary [Femoral Lymph Nodes Enlarged Bilaterally] : femoral [Inguinal Lymph Nodes Enlarged Bilaterally] : inguinal [No CVA Tenderness] : no ~M costovertebral angle tenderness [No Spinal Tenderness] : no spinal tenderness [Abnormal Walk] : normal gait [Nail Clubbing] : no clubbing  or cyanosis of the fingernails [Musculoskeletal - Swelling] : no joint swelling seen [Motor Tone] : muscle strength and tone were normal [Skin Color & Pigmentation] : normal skin color and pigmentation [Skin Turgor] : normal skin turgor [] : no rash [Deep Tendon Reflexes (DTR)] : deep tendon reflexes were 2+ and symmetric [Sensation] : the sensory exam was normal to light touch and pinprick [No Focal Deficits] : no focal deficits [Oriented To Time, Place, And Person] : oriented to person, place, and time [Impaired Insight] : insight and judgment were intact [Affect] : the affect was normal [FreeTextEntry1] : 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

## 2022-07-19 DIAGNOSIS — F41.9 ANXIETY DISORDER, UNSPECIFIED: ICD-10-CM

## 2022-07-19 DIAGNOSIS — Z85.9 PERSONAL HISTORY OF MALIGNANT NEOPLASM, UNSPECIFIED: ICD-10-CM

## 2022-07-19 DIAGNOSIS — K51.90 ULCERATIVE COLITIS, UNSPECIFIED, WITHOUT COMPLICATIONS: ICD-10-CM

## 2022-07-19 DIAGNOSIS — E88.9 METABOLIC DISORDER, UNSPECIFIED: ICD-10-CM

## 2022-07-19 DIAGNOSIS — K64.4 RESIDUAL HEMORRHOIDAL SKIN TAGS: ICD-10-CM

## 2022-07-19 DIAGNOSIS — K52.9 NONINFECTIVE GASTROENTERITIS AND COLITIS, UNSPECIFIED: ICD-10-CM

## 2022-07-19 DIAGNOSIS — Z92.21 PERSONAL HISTORY OF ANTINEOPLASTIC CHEMOTHERAPY: ICD-10-CM

## 2022-07-19 DIAGNOSIS — Z88.0 ALLERGY STATUS TO PENICILLIN: ICD-10-CM

## 2022-07-19 DIAGNOSIS — R63.4 ABNORMAL WEIGHT LOSS: ICD-10-CM

## 2022-07-28 DIAGNOSIS — K51.90 ULCERATIVE COLITIS, UNSPECIFIED, WITHOUT COMPLICATIONS: ICD-10-CM

## 2022-07-29 ENCOUNTER — APPOINTMENT (OUTPATIENT)
Dept: INFUSION THERAPY | Facility: CLINIC | Age: 32
End: 2022-07-29

## 2022-07-29 ENCOUNTER — OUTPATIENT (OUTPATIENT)
Dept: OUTPATIENT SERVICES | Facility: HOSPITAL | Age: 32
LOS: 1 days | End: 2022-07-29
Payer: MEDICAID

## 2022-07-29 VITALS
SYSTOLIC BLOOD PRESSURE: 110 MMHG | WEIGHT: 164.91 LBS | HEART RATE: 77 BPM | OXYGEN SATURATION: 99 % | RESPIRATION RATE: 19 BRPM | TEMPERATURE: 97 F | DIASTOLIC BLOOD PRESSURE: 67 MMHG

## 2022-07-29 DIAGNOSIS — K52.3 INDETERMINATE COLITIS: ICD-10-CM

## 2022-07-29 LAB
ALBUMIN SERPL ELPH-MCNC: 4.5 G/DL — SIGNIFICANT CHANGE UP (ref 3.3–5)
ALP SERPL-CCNC: 78 U/L — SIGNIFICANT CHANGE UP (ref 40–120)
ALT FLD-CCNC: 32 U/L — SIGNIFICANT CHANGE UP (ref 10–45)
ANION GAP SERPL CALC-SCNC: 11 MMOL/L — SIGNIFICANT CHANGE UP (ref 5–17)
AST SERPL-CCNC: 16 U/L — SIGNIFICANT CHANGE UP (ref 10–40)
BILIRUB SERPL-MCNC: 0.3 MG/DL — SIGNIFICANT CHANGE UP (ref 0.2–1.2)
BUN SERPL-MCNC: 25 MG/DL — HIGH (ref 7–23)
CALCIUM SERPL-MCNC: 9.4 MG/DL — SIGNIFICANT CHANGE UP (ref 8.4–10.5)
CHLORIDE SERPL-SCNC: 102 MMOL/L — SIGNIFICANT CHANGE UP (ref 96–108)
CO2 SERPL-SCNC: 25 MMOL/L — SIGNIFICANT CHANGE UP (ref 22–31)
CREAT SERPL-MCNC: 0.76 MG/DL — SIGNIFICANT CHANGE UP (ref 0.5–1.3)
CRP SERPL-MCNC: <3 MG/L — SIGNIFICANT CHANGE UP (ref 0–4)
EGFR: 107 ML/MIN/1.73M2 — SIGNIFICANT CHANGE UP
GLUCOSE SERPL-MCNC: 140 MG/DL — HIGH (ref 70–99)
HCT VFR BLD CALC: 35.1 % — SIGNIFICANT CHANGE UP (ref 34.5–45)
HGB BLD-MCNC: 11.1 G/DL — LOW (ref 11.5–15.5)
MCHC RBC-ENTMCNC: 25.6 PG — LOW (ref 27–34)
MCHC RBC-ENTMCNC: 31.6 GM/DL — LOW (ref 32–36)
MCV RBC AUTO: 81.1 FL — SIGNIFICANT CHANGE UP (ref 80–100)
NRBC # BLD: 0 /100 WBCS — SIGNIFICANT CHANGE UP (ref 0–0)
PLATELET # BLD AUTO: 377 K/UL — SIGNIFICANT CHANGE UP (ref 150–400)
POTASSIUM SERPL-MCNC: 3.8 MMOL/L — SIGNIFICANT CHANGE UP (ref 3.5–5.3)
POTASSIUM SERPL-SCNC: 3.8 MMOL/L — SIGNIFICANT CHANGE UP (ref 3.5–5.3)
PROT SERPL-MCNC: 6.9 G/DL — SIGNIFICANT CHANGE UP (ref 6–8.3)
RBC # BLD: 4.33 M/UL — SIGNIFICANT CHANGE UP (ref 3.8–5.2)
RBC # FLD: 16 % — HIGH (ref 10.3–14.5)
SODIUM SERPL-SCNC: 138 MMOL/L — SIGNIFICANT CHANGE UP (ref 135–145)
WBC # BLD: 13.91 K/UL — HIGH (ref 3.8–10.5)
WBC # FLD AUTO: 13.91 K/UL — HIGH (ref 3.8–10.5)

## 2022-07-29 PROCEDURE — 80053 COMPREHEN METABOLIC PANEL: CPT

## 2022-07-29 PROCEDURE — 96413 CHEMO IV INFUSION 1 HR: CPT

## 2022-07-29 PROCEDURE — 85027 COMPLETE CBC AUTOMATED: CPT

## 2022-07-29 PROCEDURE — 36415 COLL VENOUS BLD VENIPUNCTURE: CPT

## 2022-07-29 PROCEDURE — 86140 C-REACTIVE PROTEIN: CPT

## 2022-07-29 RX ORDER — VEDOLIZUMAB 108 MG/.68ML
300 INJECTION, SOLUTION SUBCUTANEOUS ONCE
Refills: 0 | Status: COMPLETED | OUTPATIENT
Start: 2022-07-29 | End: 2022-07-29

## 2022-07-29 RX ADMIN — VEDOLIZUMAB 300 MILLIGRAM(S): 108 INJECTION, SOLUTION SUBCUTANEOUS at 16:28

## 2022-07-29 RX ADMIN — VEDOLIZUMAB 500 MILLIGRAM(S): 108 INJECTION, SOLUTION SUBCUTANEOUS at 16:28

## 2022-08-11 PROCEDURE — 87449 NOS EACH ORGANISM AG IA: CPT

## 2022-08-11 PROCEDURE — 86140 C-REACTIVE PROTEIN: CPT

## 2022-08-11 PROCEDURE — 81001 URINALYSIS AUTO W/SCOPE: CPT

## 2022-08-11 PROCEDURE — 82962 GLUCOSE BLOOD TEST: CPT

## 2022-08-11 PROCEDURE — 80053 COMPREHEN METABOLIC PANEL: CPT

## 2022-08-11 PROCEDURE — 86901 BLOOD TYPING SEROLOGIC RH(D): CPT

## 2022-08-11 PROCEDURE — 86900 BLOOD TYPING SEROLOGIC ABO: CPT

## 2022-08-11 PROCEDURE — 84100 ASSAY OF PHOSPHORUS: CPT

## 2022-08-11 PROCEDURE — U0005: CPT

## 2022-08-11 PROCEDURE — 81025 URINE PREGNANCY TEST: CPT

## 2022-08-11 PROCEDURE — 85730 THROMBOPLASTIN TIME PARTIAL: CPT

## 2022-08-11 PROCEDURE — 36415 COLL VENOUS BLD VENIPUNCTURE: CPT

## 2022-08-11 PROCEDURE — U0003: CPT

## 2022-08-11 PROCEDURE — 83735 ASSAY OF MAGNESIUM: CPT

## 2022-08-11 PROCEDURE — 87507 IADNA-DNA/RNA PROBE TQ 12-25: CPT

## 2022-08-11 PROCEDURE — 85025 COMPLETE CBC W/AUTO DIFF WBC: CPT

## 2022-08-11 PROCEDURE — 87324 CLOSTRIDIUM AG IA: CPT

## 2022-08-11 PROCEDURE — 85652 RBC SED RATE AUTOMATED: CPT

## 2022-08-11 PROCEDURE — 80048 BASIC METABOLIC PNL TOTAL CA: CPT

## 2022-08-11 PROCEDURE — 83036 HEMOGLOBIN GLYCOSYLATED A1C: CPT

## 2022-08-11 PROCEDURE — 85610 PROTHROMBIN TIME: CPT

## 2022-08-11 PROCEDURE — 87635 SARS-COV-2 COVID-19 AMP PRB: CPT

## 2022-08-11 PROCEDURE — 83690 ASSAY OF LIPASE: CPT

## 2022-08-11 PROCEDURE — 86850 RBC ANTIBODY SCREEN: CPT

## 2022-08-11 PROCEDURE — 88305 TISSUE EXAM BY PATHOLOGIST: CPT

## 2022-08-11 PROCEDURE — 99285 EMERGENCY DEPT VISIT HI MDM: CPT

## 2022-08-17 ENCOUNTER — APPOINTMENT (OUTPATIENT)
Dept: GASTROENTEROLOGY | Facility: CLINIC | Age: 32
End: 2022-08-17

## 2022-08-17 PROCEDURE — 99214 OFFICE O/P EST MOD 30 MIN: CPT | Mod: 95

## 2022-08-22 NOTE — HISTORY OF PRESENT ILLNESS
[Home] : at home, [unfilled] , at the time of the visit. [Medical Office: ([facility])___] : at the medical office located in  [Verbal consent obtained from patient] : the patient, [unfilled] [de-identified] : 33 yo F PMH liver cancer at age 16, history of indeterminate colitis (initially diagnosed with CD and later was told diagnosis was UC; previously on sulfasalazine for many years, recently with flare and started on budesonide with minimal response; more recently on mesalamine and hydrocortisone suppositories with minimal improvement) with no response, recent hospitalization to [facility] (7/5-7/12) for UC flare, improved with IV steroids, now 2 doses of Entyvio on prednisone taper.\par \par 8/17/22\par Currently on Pred 40 (plan to taper by 5 tomorrow)\par BM 1x/ day\par Usually normal consistency, occasionally small amount red blood\par Slightly lightheaded when goes from sitting to standing\par Fatigue improved, good energy\par Returning to prior level of physical activity \par No myalgias or joint issues\par \par First dose Entyvio: Jul 15\par Last dose Entyvio: Jul 29th\par Next dose Aug 26th\par \par Tolerated well\par \par Prior visit:\par She was admitted to [facility] from 7/5-7/12/22 for persistent, frequent bloody BMs (15-20/day), refractory to PO steroids. GI PCR/Cdiff testing negative. s/p Flexible Sigmoidoscopy (7/6) demonstrating continuous erythematous, edematous mucosa with exudates/ulceration, loss of vascularity and spontaneous bleeding, most prominent in rectum, consistent with EDWARDS 3 disease. Advanced gastroscope to 25 cm before withdrawing scope. No retroflexion performed 2/2 erythema/edema (Biopsy). External hemorrhoids. Pathology demonstrating moderate active chronic inflammation with cryptitis in the rectum and sigmoid. Started on IV SoluMedrol 20 mg q8h while inpatient, with improvement in bowel movement frequency and resolution of BRBPR. Discharged home on Prednisone 60 mg daily. \par \par Notes since her discharge home on 7/12, she only has 1-2 formed, non-bloody BMs during the day, no longer with BMs at night. Urgency has since resolved. Still with some lingering LLQ tenderness with deep palpation but otherwise significantly improved. Arthralgias resolving. Remainder of ROS negative.  \par \par 6/20/22:\par 10 yrs ago had bleeding, no urgency. Had a colonoscopy and said it was CD. \par For 10 yrs until this Feb was on sulfasalazine 800mg. (had 'chill crohns') and occasional bleeding and would doa canasa suppository and would get better. \par \par in Feb started having blood with every BM. Was having urgency and still had bleeding every time. Had a colonoscopy on May 9th and told it was UC at [facility] with Dr. Geronimo Obrien (Edwards 2 disease activity). Was on budesonide and hydrocortisone enemas with no improvement for piror to colonoscopy. During the day bad, but could put it in at night and felt ok. Felt depleted and usually could still take subway and live normal life. After colonoscopy Switched her to mesalamine 0.375 2 pills twice a day, continued hydrocortisone enemas. But got to thep oint where put enema in and it wouldn't stay in. Stopped that but stayed on mesalamine. \par \par Urgency getting worse. For the past 2 week going 20x/day. Waking up at night. \par Has no energy. Lying down all day and is in the bathroom. Can't live normally. \par \par She has never been told she had a fistula or a stricture. \par Crohns diagnosed at [facility] with Dr. Ramires (was scattered inflammation throughout). He wrote down diagnosis of UC however. \par \par Since Feb has been having blood every time. \par Recently over the past 2 weeks having tons of BMs, even worse. \par \par Had a period waking up at night feeling feverish. Stopped getting that. Also having "sore eyes". No anterior uveitis, joint pains, rashes. \par \par Has never been on prednisone in the past. \par \par Started cholestyramine because of diarrhea. # Bms went up, did not help. \par \par no abdominal pain right now. Sometimes very uncomfortable feeling. \par Feels queasy often but never thrown up. \par Will oftenonly want to eat pretzels. \par \par Has lost 10-15 lbs since Feb 2022. the past few weeks weightloss accelerated. \par \par BMs are loose but brown. \par Sometimes a little mcuus, sometimes when she wipes sees mucus. 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

## 2022-08-22 NOTE — HISTORY OF PRESENT ILLNESS
[Home] : at home, [unfilled] , at the time of the visit. [Medical Office: ([facility])___] : at the medical office located in  [Verbal consent obtained from patient] : the patient, [unfilled] [de-identified] : 33 yo F PMH liver cancer at age 16, history of indeterminate colitis (initially diagnosed with CD and later was told diagnosis was UC; previously on sulfasalazine for many years, recently with flare and started on budesonide with minimal response; more recently on mesalamine and hydrocortisone suppositories with minimal improvement) with no response, recent hospitalization to [facility] (7/5-7/12) for UC flare, improved with IV steroids, now 2 doses of Entyvio on prednisone taper.\par \par 8/17/22\par Currently on Pred 40 (plan to taper by 5 tomorrow)\par BM 1x/ day\par Usually normal consistency, occasionally small amount red blood\par Slightly lightheaded when goes from sitting to standing\par Fatigue improved, good energy\par Returning to prior level of physical activity \par No myalgias or joint issues\par \par First dose Entyvio: Jul 15\par Last dose Entyvio: Jul 29th\par Next dose Aug 26th\par \par Tolerated well\par \par Prior visit:\par She was admitted to [facility] from 7/5-7/12/22 for persistent, frequent bloody BMs (15-20/day), refractory to PO steroids. GI PCR/Cdiff testing negative. s/p Flexible Sigmoidoscopy (7/6) demonstrating continuous erythematous, edematous mucosa with exudates/ulceration, loss of vascularity and spontaneous bleeding, most prominent in rectum, consistent with EDWARDS 3 disease. Advanced gastroscope to 25 cm before withdrawing scope. No retroflexion performed 2/2 erythema/edema (Biopsy). External hemorrhoids. Pathology demonstrating moderate active chronic inflammation with cryptitis in the rectum and sigmoid. Started on IV SoluMedrol 20 mg q8h while inpatient, with improvement in bowel movement frequency and resolution of BRBPR. Discharged home on Prednisone 60 mg daily. \par \par Notes since her discharge home on 7/12, she only has 1-2 formed, non-bloody BMs during the day, no longer with BMs at night. Urgency has since resolved. Still with some lingering LLQ tenderness with deep palpation but otherwise significantly improved. Arthralgias resolving. Remainder of ROS negative.  \par \par 6/20/22:\par 10 yrs ago had bleeding, no urgency. Had a colonoscopy and said it was CD. \par For 10 yrs until this Feb was on sulfasalazine 800mg. (had 'chill crohns') and occasional bleeding and would doa canasa suppository and would get better. \par \par in Feb started having blood with every BM. Was having urgency and still had bleeding every time. Had a colonoscopy on May 9th and told it was UC at [facility] with Dr. Geronimo Obrien (Edwards 2 disease activity). Was on budesonide and hydrocortisone enemas with no improvement for piror to colonoscopy. During the day bad, but could put it in at night and felt ok. Felt depleted and usually could still take subway and live normal life. After colonoscopy Switched her to mesalamine 0.375 2 pills twice a day, continued hydrocortisone enemas. But got to thep oint where put enema in and it wouldn't stay in. Stopped that but stayed on mesalamine. \par \par Urgency getting worse. For the past 2 week going 20x/day. Waking up at night. \par Has no energy. Lying down all day and is in the bathroom. Can't live normally. \par \par She has never been told she had a fistula or a stricture. \par Crohns diagnosed at [facility] with Dr. Ramires (was scattered inflammation throughout). He wrote down diagnosis of UC however. \par \par Since Feb has been having blood every time. \par Recently over the past 2 weeks having tons of BMs, even worse. \par \par Had a period waking up at night feeling feverish. Stopped getting that. Also having "sore eyes". No anterior uveitis, joint pains, rashes. \par \par Has never been on prednisone in the past. \par \par Started cholestyramine because of diarrhea. # Bms went up, did not help. \par \par no abdominal pain right now. Sometimes very uncomfortable feeling. \par Feels queasy often but never thrown up. \par Will oftenonly want to eat pretzels. \par \par Has lost 10-15 lbs since Feb 2022. the past few weeks weightloss accelerated. \par \par BMs are loose but brown. \par Sometimes a little mcuus, sometimes when she wipes sees mucus. 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

## 2022-08-22 NOTE — PHYSICAL EXAM
[General Appearance - Alert] : alert [General Appearance - In No Acute Distress] : in no acute distress [] : normal voice and communication [Oriented To Time, Place, And Person] : oriented to person, place, and time [Impaired Insight] : insight and judgment were intact

## 2022-08-22 NOTE — REVIEW OF SYSTEMS
[Abdominal Pain] : abdominal pain [Vomiting] : no vomiting [Constipation] : no constipation [Diarrhea] : no diarrhea [Heartburn] : no heartburn [Melena] : no melena [As Noted in HPI] : as noted in HPI [Negative] : Heme/Lymph

## 2022-08-22 NOTE — ASSESSMENT
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; previously on sulfasalazine for many years, recently with flare and started on budesonide with minimal response; more recently on mesalamine and hydrocortisone suppositories with minimal improvement) with no response, recent hospitalization to [facility] (7/5-7/12) for UC flare, improved with IV steroids, now here for follow up s/p 2 doses of Entyvio + prednisone taper.\par \par #Indeterminate Colitis\par Patient history of indeterminate colitis, initially diagnosed as CD, then later told it was UC, previously on sulfasalazine for many years, recently with flare and started on budesonide with minimal response; more recently on mesalamine and hydrocortisone suppositories with minimal improvement) with no response, recent hospitalization to 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 Recommended that she go back up by 5 if experiences flare up of symptoms.\par - Repeat colonoscopy in Dec/ Jan for evaluation of mucosal healing\par \par #HCM:\par - UTD with vacination\par - Annual skin exam utd\par - Annual GYN exam utd\par - Dexa scan when completes taper

## 2022-08-22 NOTE — ASSESSMENT
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; previously on sulfasalazine for many years, recently with flare and started on budesonide with minimal response; more recently on mesalamine and hydrocortisone suppositories with minimal improvement) with no response, recent hospitalization to [facility] (7/5-7/12) for UC flare, improved with IV steroids, now here for follow up s/p 2 doses of Entyvio + prednisone taper.\par \par #Indeterminate Colitis\par Patient history of indeterminate colitis, initially diagnosed as CD, then later told it was UC, previously on sulfasalazine for many years, recently with flare and started on budesonide with minimal response; more recently on mesalamine and hydrocortisone suppositories with minimal improvement) with no response, recent hospitalization to 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 Recommended that she go back up by 5 if experiences flare up of symptoms.\par - Repeat colonoscopy in Dec/ Jan for evaluation of mucosal healing\par \par #HCM:\par - UTD with vacination\par - Annual skin exam utd\par - Annual GYN exam utd\par - Dexa scan when completes taper

## 2022-08-26 ENCOUNTER — OUTPATIENT (OUTPATIENT)
Dept: OUTPATIENT SERVICES | Facility: HOSPITAL | Age: 32
LOS: 1 days | End: 2022-08-26
Payer: MEDICAID

## 2022-08-26 ENCOUNTER — APPOINTMENT (OUTPATIENT)
Dept: INFUSION THERAPY | Facility: CLINIC | Age: 32
End: 2022-08-26

## 2022-08-26 VITALS
DIASTOLIC BLOOD PRESSURE: 74 MMHG | HEART RATE: 100 BPM | RESPIRATION RATE: 20 BRPM | OXYGEN SATURATION: 100 % | TEMPERATURE: 99 F | SYSTOLIC BLOOD PRESSURE: 120 MMHG | WEIGHT: 164.91 LBS

## 2022-08-26 DIAGNOSIS — K52.3 INDETERMINATE COLITIS: ICD-10-CM

## 2022-08-26 LAB
ALBUMIN SERPL ELPH-MCNC: 3.3 G/DL — SIGNIFICANT CHANGE UP (ref 3.3–5)
ALP SERPL-CCNC: 88 U/L — SIGNIFICANT CHANGE UP (ref 40–120)
ALT FLD-CCNC: 29 U/L — SIGNIFICANT CHANGE UP (ref 10–45)
ANION GAP SERPL CALC-SCNC: 12 MMOL/L — SIGNIFICANT CHANGE UP (ref 5–17)
AST SERPL-CCNC: 24 U/L — SIGNIFICANT CHANGE UP (ref 10–40)
BILIRUB SERPL-MCNC: 0.7 MG/DL — SIGNIFICANT CHANGE UP (ref 0.2–1.2)
BUN SERPL-MCNC: 20 MG/DL — SIGNIFICANT CHANGE UP (ref 7–23)
CALCIUM SERPL-MCNC: 8.8 MG/DL — SIGNIFICANT CHANGE UP (ref 8.4–10.5)
CHLORIDE SERPL-SCNC: 101 MMOL/L — SIGNIFICANT CHANGE UP (ref 96–108)
CO2 SERPL-SCNC: 27 MMOL/L — SIGNIFICANT CHANGE UP (ref 22–31)
CREAT SERPL-MCNC: 0.9 MG/DL — SIGNIFICANT CHANGE UP (ref 0.5–1.3)
CRP SERPL-MCNC: 6.6 MG/L — HIGH (ref 0–4)
EGFR: 87 ML/MIN/1.73M2 — SIGNIFICANT CHANGE UP
GLUCOSE SERPL-MCNC: 136 MG/DL — HIGH (ref 70–99)
HCT VFR BLD CALC: 34.3 % — LOW (ref 34.5–45)
HGB BLD-MCNC: 10.7 G/DL — LOW (ref 11.5–15.5)
LYMPHOCYTES # BLD AUTO: 1.2 K/UL — SIGNIFICANT CHANGE UP (ref 1–3.3)
LYMPHOCYTES # BLD AUTO: 7.8 % — LOW (ref 13–44)
MCHC RBC-ENTMCNC: 25.1 PG — LOW (ref 27–34)
MCHC RBC-ENTMCNC: 31.2 GM/DL — LOW (ref 32–36)
MCV RBC AUTO: 80.5 FL — SIGNIFICANT CHANGE UP (ref 80–100)
NEUTROPHILS # BLD AUTO: 14.2 K/UL — HIGH (ref 1.8–7.4)
NEUTROPHILS NFR BLD AUTO: 88.8 % — HIGH (ref 43–77)
PLATELET # BLD AUTO: 450 K/UL — HIGH (ref 150–400)
POTASSIUM SERPL-MCNC: 4 MMOL/L — SIGNIFICANT CHANGE UP (ref 3.5–5.3)
POTASSIUM SERPL-SCNC: 4 MMOL/L — SIGNIFICANT CHANGE UP (ref 3.5–5.3)
PROT SERPL-MCNC: 7.1 G/DL — SIGNIFICANT CHANGE UP (ref 6–8.3)
RBC # BLD: 4.26 M/UL — SIGNIFICANT CHANGE UP (ref 3.8–5.2)
RBC # FLD: 16.7 % — HIGH (ref 10.3–14.5)
SODIUM SERPL-SCNC: 140 MMOL/L — SIGNIFICANT CHANGE UP (ref 135–145)
WBC # BLD: 15.9 K/UL — HIGH (ref 3.8–10.5)
WBC # FLD AUTO: 15.9 K/UL — HIGH (ref 3.8–10.5)

## 2022-08-26 PROCEDURE — 80053 COMPREHEN METABOLIC PANEL: CPT

## 2022-08-26 PROCEDURE — 85025 COMPLETE CBC W/AUTO DIFF WBC: CPT

## 2022-08-26 PROCEDURE — 96413 CHEMO IV INFUSION 1 HR: CPT

## 2022-08-26 PROCEDURE — 86140 C-REACTIVE PROTEIN: CPT

## 2022-08-26 PROCEDURE — 36415 COLL VENOUS BLD VENIPUNCTURE: CPT

## 2022-08-26 RX ADMIN — VEDOLIZUMAB 500 MILLIGRAM(S): 108 INJECTION, SOLUTION SUBCUTANEOUS at 14:21

## 2022-08-26 RX ADMIN — VEDOLIZUMAB 300 MILLIGRAM(S): 108 INJECTION, SOLUTION SUBCUTANEOUS at 14:55

## 2022-10-11 ENCOUNTER — NON-APPOINTMENT (OUTPATIENT)
Age: 32
End: 2022-10-11

## 2022-10-13 ENCOUNTER — APPOINTMENT (OUTPATIENT)
Dept: OTOLARYNGOLOGY | Facility: CLINIC | Age: 32
End: 2022-10-13

## 2022-10-13 ENCOUNTER — NON-APPOINTMENT (OUTPATIENT)
Age: 32
End: 2022-10-13

## 2022-10-13 VITALS
BODY MASS INDEX: 29.82 KG/M2 | TEMPERATURE: 98.4 F | HEART RATE: 80 BPM | HEIGHT: 67 IN | WEIGHT: 190 LBS | SYSTOLIC BLOOD PRESSURE: 109 MMHG | DIASTOLIC BLOOD PRESSURE: 74 MMHG | OXYGEN SATURATION: 96 %

## 2022-10-13 DIAGNOSIS — D64.9 ANEMIA, UNSPECIFIED: ICD-10-CM

## 2022-10-13 DIAGNOSIS — R42 DIZZINESS AND GIDDINESS: ICD-10-CM

## 2022-10-13 PROCEDURE — 92504 EAR MICROSCOPY EXAMINATION: CPT

## 2022-10-13 PROCEDURE — 99203 OFFICE O/P NEW LOW 30 MIN: CPT | Mod: 25

## 2022-10-13 NOTE — ASSESSMENT
[FreeTextEntry1] : Currently there is no evidence of vestibular dysfunction and her description is more of orthostasis than vertigo; agree w/ current mgmt. Discussed avoiding driving/operating machinery, staying away from subway platform edges, etc. until no longer dizzy.\par

## 2022-10-13 NOTE — PHYSICAL EXAM
[Binocular Microscopic Exam] : Binocular microscopic exam was performed [de-identified] : EOMI/PERRL w/ no resting nystagmus; CN 2-12 intact; good smooth pursuit; negative fistula test AU; negative Laurel Hallpike & supine roll test bilaterally w/ Frenzel goggles; normal Hamalgyi head thrust; negative enhanced Rhomberg; normal Fukuda; unremarkable gait; no dysdiadochokinesia [Normal] : assessment of respiratory effort is normal

## 2022-10-13 NOTE — CONSULT LETTER
[Dear  ___] : Dear  [unfilled], [Consult Letter:] : I had the pleasure of evaluating your patient, [unfilled]. [Please see my note below.] : Please see my note below. [Consult Closing:] : Thank you very much for allowing me to participate in the care of this patient.  If you have any questions, please do not hesitate to contact me. [Sincerely,] : Sincerely, [FreeTextEntry3] : BETO Melendez Jr, MD, FAAOHNS\par Otolaryngologist\par [facility]

## 2022-10-20 RX ORDER — VEDOLIZUMAB 108 MG/.68ML
300 INJECTION, SOLUTION SUBCUTANEOUS ONCE
Refills: 0 | Status: COMPLETED | OUTPATIENT
Start: 2023-02-22 | End: 2023-02-22

## 2022-10-21 ENCOUNTER — OUTPATIENT (OUTPATIENT)
Dept: OUTPATIENT SERVICES | Facility: HOSPITAL | Age: 32
LOS: 1 days | End: 2022-10-21
Payer: MEDICAID

## 2022-10-21 ENCOUNTER — APPOINTMENT (OUTPATIENT)
Dept: INFUSION THERAPY | Facility: CLINIC | Age: 32
End: 2022-10-21

## 2022-10-21 VITALS
HEART RATE: 73 BPM | WEIGHT: 164.91 LBS | SYSTOLIC BLOOD PRESSURE: 108 MMHG | TEMPERATURE: 99 F | OXYGEN SATURATION: 98 % | HEIGHT: 66 IN | RESPIRATION RATE: 20 BRPM | DIASTOLIC BLOOD PRESSURE: 71 MMHG

## 2022-10-21 DIAGNOSIS — K50.90 CROHN'S DISEASE, UNSPECIFIED, WITHOUT COMPLICATIONS: ICD-10-CM

## 2022-10-21 LAB
ALBUMIN SERPL ELPH-MCNC: 4.5 G/DL — SIGNIFICANT CHANGE UP (ref 3.3–5)
ALP SERPL-CCNC: 71 U/L — SIGNIFICANT CHANGE UP (ref 40–120)
ALT FLD-CCNC: 25 U/L — SIGNIFICANT CHANGE UP (ref 10–45)
ANION GAP SERPL CALC-SCNC: 11 MMOL/L — SIGNIFICANT CHANGE UP (ref 5–17)
AST SERPL-CCNC: 28 U/L — SIGNIFICANT CHANGE UP (ref 10–40)
BILIRUB SERPL-MCNC: <0.2 MG/DL — SIGNIFICANT CHANGE UP (ref 0.2–1.2)
BUN SERPL-MCNC: 14 MG/DL — SIGNIFICANT CHANGE UP (ref 7–23)
CALCIUM SERPL-MCNC: 9.2 MG/DL — SIGNIFICANT CHANGE UP (ref 8.4–10.5)
CHLORIDE SERPL-SCNC: 104 MMOL/L — SIGNIFICANT CHANGE UP (ref 96–108)
CO2 SERPL-SCNC: 29 MMOL/L — SIGNIFICANT CHANGE UP (ref 22–31)
CREAT SERPL-MCNC: 0.76 MG/DL — SIGNIFICANT CHANGE UP (ref 0.5–1.3)
CRP SERPL-MCNC: <3 MG/L — SIGNIFICANT CHANGE UP (ref 0–4)
EGFR: 107 ML/MIN/1.73M2 — SIGNIFICANT CHANGE UP
GLUCOSE SERPL-MCNC: 109 MG/DL — HIGH (ref 70–99)
HCT VFR BLD CALC: 37 % — SIGNIFICANT CHANGE UP (ref 34.5–45)
HGB BLD-MCNC: 11.4 G/DL — LOW (ref 11.5–15.5)
MCHC RBC-ENTMCNC: 24.7 PG — LOW (ref 27–34)
MCHC RBC-ENTMCNC: 30.8 GM/DL — LOW (ref 32–36)
MCV RBC AUTO: 80.1 FL — SIGNIFICANT CHANGE UP (ref 80–100)
NRBC # BLD: 0 /100 WBCS — SIGNIFICANT CHANGE UP (ref 0–0)
PLATELET # BLD AUTO: 397 K/UL — SIGNIFICANT CHANGE UP (ref 150–400)
POTASSIUM SERPL-MCNC: 4.4 MMOL/L — SIGNIFICANT CHANGE UP (ref 3.5–5.3)
POTASSIUM SERPL-SCNC: 4.4 MMOL/L — SIGNIFICANT CHANGE UP (ref 3.5–5.3)
PROT SERPL-MCNC: 6.9 G/DL — SIGNIFICANT CHANGE UP (ref 6–8.3)
RBC # BLD: 4.62 M/UL — SIGNIFICANT CHANGE UP (ref 3.8–5.2)
RBC # FLD: 15.6 % — HIGH (ref 10.3–14.5)
SODIUM SERPL-SCNC: 144 MMOL/L — SIGNIFICANT CHANGE UP (ref 135–145)
WBC # BLD: 9.85 K/UL — SIGNIFICANT CHANGE UP (ref 3.8–10.5)
WBC # FLD AUTO: 9.85 K/UL — SIGNIFICANT CHANGE UP (ref 3.8–10.5)

## 2022-10-21 PROCEDURE — 80053 COMPREHEN METABOLIC PANEL: CPT

## 2022-10-21 PROCEDURE — 36415 COLL VENOUS BLD VENIPUNCTURE: CPT

## 2022-10-21 PROCEDURE — 85027 COMPLETE CBC AUTOMATED: CPT

## 2022-10-21 PROCEDURE — 96413 CHEMO IV INFUSION 1 HR: CPT

## 2022-10-21 PROCEDURE — 86140 C-REACTIVE PROTEIN: CPT

## 2022-10-21 RX ORDER — VEDOLIZUMAB 108 MG/.68ML
300 INJECTION, SOLUTION SUBCUTANEOUS ONCE
Refills: 0 | Status: COMPLETED | OUTPATIENT
Start: 2022-10-21 | End: 2022-10-21

## 2022-10-21 RX ADMIN — VEDOLIZUMAB 500 MILLIGRAM(S): 108 INJECTION, SOLUTION SUBCUTANEOUS at 15:38

## 2022-10-21 RX ADMIN — VEDOLIZUMAB 300 MILLIGRAM(S): 108 INJECTION, SOLUTION SUBCUTANEOUS at 16:06

## 2022-12-05 ENCOUNTER — NON-APPOINTMENT (OUTPATIENT)
Age: 32
End: 2022-12-05

## 2022-12-27 ENCOUNTER — OUTPATIENT (OUTPATIENT)
Dept: OUTPATIENT SERVICES | Facility: HOSPITAL | Age: 32
LOS: 1 days | End: 2022-12-27
Payer: MEDICAID

## 2022-12-27 ENCOUNTER — APPOINTMENT (OUTPATIENT)
Dept: INFUSION THERAPY | Facility: CLINIC | Age: 32
End: 2022-12-27

## 2022-12-27 VITALS
SYSTOLIC BLOOD PRESSURE: 112 MMHG | OXYGEN SATURATION: 99 % | DIASTOLIC BLOOD PRESSURE: 78 MMHG | HEART RATE: 74 BPM | RESPIRATION RATE: 18 BRPM | TEMPERATURE: 98 F

## 2022-12-27 DIAGNOSIS — K50.90 CROHN'S DISEASE, UNSPECIFIED, WITHOUT COMPLICATIONS: ICD-10-CM

## 2022-12-27 LAB — CRP SERPL-MCNC: <3 MG/L — SIGNIFICANT CHANGE UP (ref 0–4)

## 2022-12-27 PROCEDURE — 86140 C-REACTIVE PROTEIN: CPT

## 2022-12-27 PROCEDURE — 36415 COLL VENOUS BLD VENIPUNCTURE: CPT

## 2022-12-27 PROCEDURE — 96413 CHEMO IV INFUSION 1 HR: CPT

## 2022-12-27 PROCEDURE — 85027 COMPLETE CBC AUTOMATED: CPT

## 2022-12-27 PROCEDURE — 80053 COMPREHEN METABOLIC PANEL: CPT

## 2022-12-27 RX ORDER — VEDOLIZUMAB 108 MG/.68ML
300 INJECTION, SOLUTION SUBCUTANEOUS ONCE
Refills: 0 | Status: COMPLETED | OUTPATIENT
Start: 2022-12-27 | End: 2022-12-27

## 2022-12-27 RX ADMIN — VEDOLIZUMAB 300 MILLIGRAM(S): 108 INJECTION, SOLUTION SUBCUTANEOUS at 16:50

## 2022-12-27 RX ADMIN — VEDOLIZUMAB 500 MILLIGRAM(S): 108 INJECTION, SOLUTION SUBCUTANEOUS at 16:20

## 2022-12-28 ENCOUNTER — TRANSCRIPTION ENCOUNTER (OUTPATIENT)
Age: 32
End: 2022-12-28

## 2022-12-28 LAB
ALBUMIN SERPL ELPH-MCNC: 4.5 G/DL — SIGNIFICANT CHANGE UP (ref 3.3–5)
ALP SERPL-CCNC: 84 U/L — SIGNIFICANT CHANGE UP (ref 40–120)
ALT FLD-CCNC: 32 U/L — SIGNIFICANT CHANGE UP (ref 10–45)
ANION GAP SERPL CALC-SCNC: 17 MMOL/L — SIGNIFICANT CHANGE UP (ref 5–17)
AST SERPL-CCNC: 42 U/L — HIGH (ref 10–40)
BILIRUB SERPL-MCNC: 0.2 MG/DL — SIGNIFICANT CHANGE UP (ref 0.2–1.2)
BUN SERPL-MCNC: 17 MG/DL — SIGNIFICANT CHANGE UP (ref 7–23)
CALCIUM SERPL-MCNC: 9.3 MG/DL — SIGNIFICANT CHANGE UP (ref 8.4–10.5)
CHLORIDE SERPL-SCNC: 104 MMOL/L — SIGNIFICANT CHANGE UP (ref 96–108)
CO2 SERPL-SCNC: 22 MMOL/L — SIGNIFICANT CHANGE UP (ref 22–31)
CREAT SERPL-MCNC: 0.94 MG/DL — SIGNIFICANT CHANGE UP (ref 0.5–1.3)
EGFR: 83 ML/MIN/1.73M2 — SIGNIFICANT CHANGE UP
GLUCOSE SERPL-MCNC: 105 MG/DL — HIGH (ref 70–99)
HCT VFR BLD CALC: 38.8 % — SIGNIFICANT CHANGE UP (ref 34.5–45)
HGB BLD-MCNC: 12.5 G/DL — SIGNIFICANT CHANGE UP (ref 11.5–15.5)
MCHC RBC-ENTMCNC: 26.4 PG — LOW (ref 27–34)
MCHC RBC-ENTMCNC: 32.2 GM/DL — SIGNIFICANT CHANGE UP (ref 32–36)
MCV RBC AUTO: 81.9 FL — SIGNIFICANT CHANGE UP (ref 80–100)
NRBC # BLD: 0 /100 WBCS — SIGNIFICANT CHANGE UP (ref 0–0)
PLATELET # BLD AUTO: 451 K/UL — HIGH (ref 150–400)
POTASSIUM SERPL-MCNC: 4.6 MMOL/L — SIGNIFICANT CHANGE UP (ref 3.5–5.3)
POTASSIUM SERPL-SCNC: 4.6 MMOL/L — SIGNIFICANT CHANGE UP (ref 3.5–5.3)
PROT SERPL-MCNC: 7.1 G/DL — SIGNIFICANT CHANGE UP (ref 6–8.3)
RBC # BLD: 4.74 M/UL — SIGNIFICANT CHANGE UP (ref 3.8–5.2)
RBC # FLD: 16.2 % — HIGH (ref 10.3–14.5)
SODIUM SERPL-SCNC: 143 MMOL/L — SIGNIFICANT CHANGE UP (ref 135–145)
WBC # BLD: 10.53 K/UL — HIGH (ref 3.8–10.5)
WBC # FLD AUTO: 10.53 K/UL — HIGH (ref 3.8–10.5)

## 2023-02-22 ENCOUNTER — APPOINTMENT (OUTPATIENT)
Dept: INFUSION THERAPY | Facility: CLINIC | Age: 33
End: 2023-02-22

## 2023-02-22 ENCOUNTER — OUTPATIENT (OUTPATIENT)
Dept: OUTPATIENT SERVICES | Facility: HOSPITAL | Age: 33
LOS: 1 days | End: 2023-02-22
Payer: MEDICAID

## 2023-02-22 VITALS
OXYGEN SATURATION: 97 % | RESPIRATION RATE: 18 BRPM | TEMPERATURE: 98 F | HEART RATE: 71 BPM | DIASTOLIC BLOOD PRESSURE: 67 MMHG | SYSTOLIC BLOOD PRESSURE: 97 MMHG

## 2023-02-22 DIAGNOSIS — K50.90 CROHN'S DISEASE, UNSPECIFIED, WITHOUT COMPLICATIONS: ICD-10-CM

## 2023-02-22 LAB
ALBUMIN SERPL ELPH-MCNC: 4.2 G/DL — SIGNIFICANT CHANGE UP (ref 3.3–5)
ALP SERPL-CCNC: 71 U/L — SIGNIFICANT CHANGE UP (ref 40–120)
ALT FLD-CCNC: 23 U/L — SIGNIFICANT CHANGE UP (ref 10–45)
ANION GAP SERPL CALC-SCNC: 10 MMOL/L — SIGNIFICANT CHANGE UP (ref 5–17)
AST SERPL-CCNC: 21 U/L — SIGNIFICANT CHANGE UP (ref 10–40)
BILIRUB SERPL-MCNC: 0.4 MG/DL — SIGNIFICANT CHANGE UP (ref 0.2–1.2)
BUN SERPL-MCNC: 14 MG/DL — SIGNIFICANT CHANGE UP (ref 7–23)
CALCIUM SERPL-MCNC: 9.4 MG/DL — SIGNIFICANT CHANGE UP (ref 8.4–10.5)
CHLORIDE SERPL-SCNC: 105 MMOL/L — SIGNIFICANT CHANGE UP (ref 96–108)
CO2 SERPL-SCNC: 26 MMOL/L — SIGNIFICANT CHANGE UP (ref 22–31)
CREAT SERPL-MCNC: 0.81 MG/DL — SIGNIFICANT CHANGE UP (ref 0.5–1.3)
CRP SERPL-MCNC: <3 MG/L — SIGNIFICANT CHANGE UP (ref 0–4)
EGFR: 99 ML/MIN/1.73M2 — SIGNIFICANT CHANGE UP
GLUCOSE SERPL-MCNC: 106 MG/DL — HIGH (ref 70–99)
HCT VFR BLD CALC: 42.3 % — SIGNIFICANT CHANGE UP (ref 34.5–45)
HGB BLD-MCNC: 13.9 G/DL — SIGNIFICANT CHANGE UP (ref 11.5–15.5)
MCHC RBC-ENTMCNC: 28.2 PG — SIGNIFICANT CHANGE UP (ref 27–34)
MCHC RBC-ENTMCNC: 32.9 GM/DL — SIGNIFICANT CHANGE UP (ref 32–36)
MCV RBC AUTO: 85.8 FL — SIGNIFICANT CHANGE UP (ref 80–100)
NRBC # BLD: 0 /100 WBCS — SIGNIFICANT CHANGE UP (ref 0–0)
PLATELET # BLD AUTO: 469 K/UL — HIGH (ref 150–400)
POTASSIUM SERPL-MCNC: 4.6 MMOL/L — SIGNIFICANT CHANGE UP (ref 3.5–5.3)
POTASSIUM SERPL-SCNC: 4.6 MMOL/L — SIGNIFICANT CHANGE UP (ref 3.5–5.3)
PROT SERPL-MCNC: 7.2 G/DL — SIGNIFICANT CHANGE UP (ref 6–8.3)
RBC # BLD: 4.93 M/UL — SIGNIFICANT CHANGE UP (ref 3.8–5.2)
RBC # FLD: 14 % — SIGNIFICANT CHANGE UP (ref 10.3–14.5)
SODIUM SERPL-SCNC: 141 MMOL/L — SIGNIFICANT CHANGE UP (ref 135–145)
WBC # BLD: 10.21 K/UL — SIGNIFICANT CHANGE UP (ref 3.8–10.5)
WBC # FLD AUTO: 10.21 K/UL — SIGNIFICANT CHANGE UP (ref 3.8–10.5)

## 2023-02-22 PROCEDURE — 96413 CHEMO IV INFUSION 1 HR: CPT

## 2023-02-22 PROCEDURE — 36415 COLL VENOUS BLD VENIPUNCTURE: CPT

## 2023-02-22 PROCEDURE — 80053 COMPREHEN METABOLIC PANEL: CPT

## 2023-02-22 PROCEDURE — 85027 COMPLETE CBC AUTOMATED: CPT

## 2023-02-22 PROCEDURE — 86140 C-REACTIVE PROTEIN: CPT

## 2023-02-22 RX ADMIN — VEDOLIZUMAB 500 MILLIGRAM(S): 108 INJECTION, SOLUTION SUBCUTANEOUS at 14:00

## 2023-02-22 RX ADMIN — VEDOLIZUMAB 300 MILLIGRAM(S): 108 INJECTION, SOLUTION SUBCUTANEOUS at 14:30

## 2023-04-11 NOTE — PATIENT PROFILE ADULT - HOME ACCESSIBILITY CONCERNS
Chief complaint:   Chief Complaint   Patient presents with   • Office Visit   • Abdominal Pain     Painful at time. Going on for last couple months.         Vitals:  Visit Vitals  /70 (BP Location: LUE - Left upper extremity, Patient Position: Sitting, Cuff Size: Regular)   Pulse 92   Resp 20   Wt 76.2 kg (168 lb)       HISTORY OF PRESENT ILLNESS     Saeed presents with Mom    For the past few months Saeed has been dealing with recurrent abdominal discomforts     States it will \"start to just hurt\" for like 5-10 minutes. He will take Tums at times.     Mom states he has missed a good deal of school because of it, averaging about once per week     He is in 8th grade at [employer].     Triggers: Stress. Sometimes milk makes his stomach \"not feel good\"    Denies constipation. Has loose stools at times.   No vomiting. Seldom reflux/belching  No flatulence concerns .    Tums taken about 3 times per week        Other significant problems:  There are no problems to display for this patient.      PAST MEDICAL, FAMILY AND SOCIAL HISTORY     Medications:  No current outpatient medications on file.     No current facility-administered medications for this visit.       Allergies:  ALLERGIES:  No Known Allergies    Past Medical  History/Surgeries:  Past Medical History:   Diagnosis Date   • Allergy    • Pneumonia     in early childhood       History reviewed. No pertinent surgical history.    Family History:  Family History   Problem Relation Age of Onset   • Anxiety disorder Mother    • Other Mother         chiari 1 malformation-repaired   • Rheumatoid Arthritis Maternal Grandfather    • Cancer, Kidney Maternal Grandfather    • Patient is unaware of any medical problems Paternal Grandmother    • Patient is unaware of any medical problems Paternal Grandfather        Social History:  Social History     Tobacco Use   • Smoking status: Never   • Smokeless tobacco: Never   Substance Use Topics   • Alcohol use: Not  on file       REVIEW OF SYSTEMS     Review of Systems   Constitutional: Negative for fatigue, fever and unexpected weight change.   HENT: Negative for congestion and postnasal drip.    Gastrointestinal: Positive for abdominal pain. Negative for abdominal distention, constipation, nausea, rectal pain and vomiting.   Skin: Negative for rash.   Psychiatric/Behavioral: The patient is not nervous/anxious.         Dislikes school but states abdominal discomforts are not necessarily only on school days       PHYSICAL EXAM     Physical Exam  Vitals and nursing note reviewed.   Constitutional:       Appearance: Normal appearance.   HENT:      Neck: Normal range of motion and neck supple.   Cardiovascular:      Rate and Rhythm: Normal rate and regular rhythm.      Pulses: Normal pulses.      Heart sounds: Normal heart sounds.   Pulmonary:      Effort: Pulmonary effort is normal.      Breath sounds: Normal breath sounds.   Abdominal:      General: Abdomen is flat. Bowel sounds are normal. There is no distension.      Tenderness: There is no guarding or rebound.   Neurological:      General: No focal deficit present.      Mental Status: He is alert.         ASSESSMENT/PLAN     Saeed was seen today for office visit and abdominal pain.    Diagnoses and all orders for this visit:    Generalized abdominal pain  -     CBC with Automated Differential; Future  -     Comprehensive Metabolic Panel; Future  -     Thyroid Stimulating Hormone Reflex; Future  -     Celiac Disease Screen, 2y And Over; Future  -     H Pylori Antibody IgG Screen; Future  -     Urinalysis With Microscopy & Culture If Indicated; Future    Other orders  -     omeprazole (PrilOSEC) 20 MG capsule; Take 1 capsule by mouth daily.      A food log is encouraged at this time.  Laboratory surveillance will be completed today.  They will be called with results.    Suspect tract stress as underlying trigger bowl go ahead and rule out other underlying causes.    Will start  trial of omeprazole 20 mg to take daily.  He can continue Tums as needed.    If the above fails or symptoms worsen they will follow-up with this provider   none

## 2023-04-18 ENCOUNTER — APPOINTMENT (OUTPATIENT)
Dept: INFUSION THERAPY | Facility: CLINIC | Age: 33
End: 2023-04-18

## 2023-04-18 ENCOUNTER — OUTPATIENT (OUTPATIENT)
Dept: OUTPATIENT SERVICES | Facility: HOSPITAL | Age: 33
LOS: 1 days | End: 2023-04-18
Payer: MEDICAID

## 2023-04-18 VITALS
RESPIRATION RATE: 18 BRPM | SYSTOLIC BLOOD PRESSURE: 109 MMHG | HEART RATE: 84 BPM | TEMPERATURE: 97 F | OXYGEN SATURATION: 99 % | DIASTOLIC BLOOD PRESSURE: 66 MMHG

## 2023-04-18 VITALS
RESPIRATION RATE: 18 BRPM | SYSTOLIC BLOOD PRESSURE: 103 MMHG | TEMPERATURE: 97 F | DIASTOLIC BLOOD PRESSURE: 70 MMHG | OXYGEN SATURATION: 98 % | WEIGHT: 164.91 LBS | HEART RATE: 81 BPM | HEIGHT: 66 IN

## 2023-04-18 DIAGNOSIS — K50.90 CROHN'S DISEASE, UNSPECIFIED, WITHOUT COMPLICATIONS: ICD-10-CM

## 2023-04-18 LAB
ALBUMIN SERPL ELPH-MCNC: 4.2 G/DL — SIGNIFICANT CHANGE UP (ref 3.3–5)
ALP SERPL-CCNC: 80 U/L — SIGNIFICANT CHANGE UP (ref 40–120)
ALT FLD-CCNC: 26 U/L — SIGNIFICANT CHANGE UP (ref 10–45)
ANION GAP SERPL CALC-SCNC: 8 MMOL/L — SIGNIFICANT CHANGE UP (ref 5–17)
AST SERPL-CCNC: 27 U/L — SIGNIFICANT CHANGE UP (ref 10–40)
BILIRUB SERPL-MCNC: 0.4 MG/DL — SIGNIFICANT CHANGE UP (ref 0.2–1.2)
BUN SERPL-MCNC: 12 MG/DL — SIGNIFICANT CHANGE UP (ref 7–23)
CALCIUM SERPL-MCNC: 9.7 MG/DL — SIGNIFICANT CHANGE UP (ref 8.4–10.5)
CHLORIDE SERPL-SCNC: 102 MMOL/L — SIGNIFICANT CHANGE UP (ref 96–108)
CO2 SERPL-SCNC: 28 MMOL/L — SIGNIFICANT CHANGE UP (ref 22–31)
CREAT SERPL-MCNC: 0.84 MG/DL — SIGNIFICANT CHANGE UP (ref 0.5–1.3)
CRP SERPL-MCNC: 3.5 MG/L — SIGNIFICANT CHANGE UP (ref 0–4)
EGFR: 94 ML/MIN/1.73M2 — SIGNIFICANT CHANGE UP
GLUCOSE SERPL-MCNC: 102 MG/DL — HIGH (ref 70–99)
HCT VFR BLD CALC: 38.4 % — SIGNIFICANT CHANGE UP (ref 34.5–45)
HGB BLD-MCNC: 12.7 G/DL — SIGNIFICANT CHANGE UP (ref 11.5–15.5)
MCHC RBC-ENTMCNC: 28.2 PG — SIGNIFICANT CHANGE UP (ref 27–34)
MCHC RBC-ENTMCNC: 33.1 GM/DL — SIGNIFICANT CHANGE UP (ref 32–36)
MCV RBC AUTO: 85.1 FL — SIGNIFICANT CHANGE UP (ref 80–100)
NRBC # BLD: 0 /100 WBCS — SIGNIFICANT CHANGE UP (ref 0–0)
PLATELET # BLD AUTO: 468 K/UL — HIGH (ref 150–400)
POTASSIUM SERPL-MCNC: 3.9 MMOL/L — SIGNIFICANT CHANGE UP (ref 3.5–5.3)
POTASSIUM SERPL-SCNC: 3.9 MMOL/L — SIGNIFICANT CHANGE UP (ref 3.5–5.3)
PROT SERPL-MCNC: 6.9 G/DL — SIGNIFICANT CHANGE UP (ref 6–8.3)
RBC # BLD: 4.51 M/UL — SIGNIFICANT CHANGE UP (ref 3.8–5.2)
RBC # FLD: 13.2 % — SIGNIFICANT CHANGE UP (ref 10.3–14.5)
SODIUM SERPL-SCNC: 138 MMOL/L — SIGNIFICANT CHANGE UP (ref 135–145)
WBC # BLD: 9.7 K/UL — SIGNIFICANT CHANGE UP (ref 3.8–10.5)
WBC # FLD AUTO: 9.7 K/UL — SIGNIFICANT CHANGE UP (ref 3.8–10.5)

## 2023-04-18 PROCEDURE — 80053 COMPREHEN METABOLIC PANEL: CPT

## 2023-04-18 PROCEDURE — 82542 COL CHROMOTOGRAPHY QUAL/QUAN: CPT

## 2023-04-18 PROCEDURE — 80280 DRUG ASSAY VEDOLIZUMAB: CPT

## 2023-04-18 PROCEDURE — 96413 CHEMO IV INFUSION 1 HR: CPT

## 2023-04-18 PROCEDURE — 85027 COMPLETE CBC AUTOMATED: CPT

## 2023-04-18 PROCEDURE — 86140 C-REACTIVE PROTEIN: CPT

## 2023-04-18 PROCEDURE — 36415 COLL VENOUS BLD VENIPUNCTURE: CPT

## 2023-04-18 RX ORDER — VEDOLIZUMAB 108 MG/.68ML
300 INJECTION, SOLUTION SUBCUTANEOUS ONCE
Refills: 0 | Status: COMPLETED | OUTPATIENT
Start: 2023-04-18 | End: 2023-04-18

## 2023-04-18 RX ADMIN — VEDOLIZUMAB 300 MILLIGRAM(S): 108 INJECTION, SOLUTION SUBCUTANEOUS at 16:50

## 2023-04-18 RX ADMIN — VEDOLIZUMAB 500 MILLIGRAM(S): 108 INJECTION, SOLUTION SUBCUTANEOUS at 16:20

## 2023-04-24 LAB
ANTIBODIES TO VEDOLIZUMAB (ATV) CONCENTRATION: < 1.6 U/ML — SIGNIFICANT CHANGE UP
PROMETHEUS ANSER VEDOLIZUMAB RESULT: SIGNIFICANT CHANGE UP
PROMETHEUS LABORATORY FOOTER: SIGNIFICANT CHANGE UP
SERUM VEDOLIZUMAB (VDZ) CONCENTRATION: 15.2 UG/ML — SIGNIFICANT CHANGE UP

## 2023-06-02 ENCOUNTER — TRANSCRIPTION ENCOUNTER (OUTPATIENT)
Age: 33
End: 2023-06-02

## 2023-06-05 ENCOUNTER — TRANSCRIPTION ENCOUNTER (OUTPATIENT)
Age: 33
End: 2023-06-05

## 2023-06-13 ENCOUNTER — APPOINTMENT (OUTPATIENT)
Dept: INFUSION THERAPY | Facility: CLINIC | Age: 33
End: 2023-06-13

## 2023-06-13 ENCOUNTER — OUTPATIENT (OUTPATIENT)
Dept: OUTPATIENT SERVICES | Facility: HOSPITAL | Age: 33
LOS: 1 days | End: 2023-06-13
Payer: MEDICAID

## 2023-06-13 VITALS
OXYGEN SATURATION: 98 % | WEIGHT: 199.96 LBS | TEMPERATURE: 98 F | SYSTOLIC BLOOD PRESSURE: 117 MMHG | RESPIRATION RATE: 16 BRPM | HEART RATE: 90 BPM | DIASTOLIC BLOOD PRESSURE: 75 MMHG | HEIGHT: 67 IN

## 2023-06-13 DIAGNOSIS — K50.90 CROHN'S DISEASE, UNSPECIFIED, WITHOUT COMPLICATIONS: ICD-10-CM

## 2023-06-13 LAB
ALBUMIN SERPL ELPH-MCNC: 4.2 G/DL — SIGNIFICANT CHANGE UP (ref 3.3–5)
ALP SERPL-CCNC: 91 U/L — SIGNIFICANT CHANGE UP (ref 40–120)
ALT FLD-CCNC: 28 U/L — SIGNIFICANT CHANGE UP (ref 10–45)
ANION GAP SERPL CALC-SCNC: 8 MMOL/L — SIGNIFICANT CHANGE UP (ref 5–17)
AST SERPL-CCNC: 23 U/L — SIGNIFICANT CHANGE UP (ref 10–40)
BILIRUB SERPL-MCNC: 0.2 MG/DL — SIGNIFICANT CHANGE UP (ref 0.2–1.2)
BUN SERPL-MCNC: 15 MG/DL — SIGNIFICANT CHANGE UP (ref 7–23)
CALCIUM SERPL-MCNC: 9.7 MG/DL — SIGNIFICANT CHANGE UP (ref 8.4–10.5)
CHLORIDE SERPL-SCNC: 103 MMOL/L — SIGNIFICANT CHANGE UP (ref 96–108)
CO2 SERPL-SCNC: 30 MMOL/L — SIGNIFICANT CHANGE UP (ref 22–31)
CREAT SERPL-MCNC: 1.01 MG/DL — SIGNIFICANT CHANGE UP (ref 0.5–1.3)
CRP SERPL-MCNC: 4.2 MG/L — HIGH (ref 0–4)
EGFR: 75 ML/MIN/1.73M2 — SIGNIFICANT CHANGE UP
GLUCOSE SERPL-MCNC: 102 MG/DL — HIGH (ref 70–99)
HCT VFR BLD CALC: 38.1 % — SIGNIFICANT CHANGE UP (ref 34.5–45)
HGB BLD-MCNC: 12.6 G/DL — SIGNIFICANT CHANGE UP (ref 11.5–15.5)
MCHC RBC-ENTMCNC: 28.3 PG — SIGNIFICANT CHANGE UP (ref 27–34)
MCHC RBC-ENTMCNC: 33.1 GM/DL — SIGNIFICANT CHANGE UP (ref 32–36)
MCV RBC AUTO: 85.4 FL — SIGNIFICANT CHANGE UP (ref 80–100)
NRBC # BLD: 0 /100 WBCS — SIGNIFICANT CHANGE UP (ref 0–0)
PLATELET # BLD AUTO: 484 K/UL — HIGH (ref 150–400)
POTASSIUM SERPL-MCNC: 4.2 MMOL/L — SIGNIFICANT CHANGE UP (ref 3.5–5.3)
POTASSIUM SERPL-SCNC: 4.2 MMOL/L — SIGNIFICANT CHANGE UP (ref 3.5–5.3)
PROT SERPL-MCNC: 7 G/DL — SIGNIFICANT CHANGE UP (ref 6–8.3)
RBC # BLD: 4.46 M/UL — SIGNIFICANT CHANGE UP (ref 3.8–5.2)
RBC # FLD: 13.5 % — SIGNIFICANT CHANGE UP (ref 10.3–14.5)
SODIUM SERPL-SCNC: 141 MMOL/L — SIGNIFICANT CHANGE UP (ref 135–145)
WBC # BLD: 10.73 K/UL — HIGH (ref 3.8–10.5)
WBC # FLD AUTO: 10.73 K/UL — HIGH (ref 3.8–10.5)

## 2023-06-13 PROCEDURE — 36415 COLL VENOUS BLD VENIPUNCTURE: CPT

## 2023-06-13 PROCEDURE — 96413 CHEMO IV INFUSION 1 HR: CPT

## 2023-06-13 PROCEDURE — 80053 COMPREHEN METABOLIC PANEL: CPT

## 2023-06-13 PROCEDURE — 85027 COMPLETE CBC AUTOMATED: CPT

## 2023-06-13 PROCEDURE — 86140 C-REACTIVE PROTEIN: CPT

## 2023-06-13 RX ORDER — VEDOLIZUMAB 108 MG/.68ML
300 INJECTION, SOLUTION SUBCUTANEOUS ONCE
Refills: 0 | Status: COMPLETED | OUTPATIENT
Start: 2023-06-13 | End: 2023-06-13

## 2023-06-13 RX ADMIN — VEDOLIZUMAB 300 MILLIGRAM(S): 108 INJECTION, SOLUTION SUBCUTANEOUS at 16:40

## 2023-06-13 RX ADMIN — VEDOLIZUMAB 500 MILLIGRAM(S): 108 INJECTION, SOLUTION SUBCUTANEOUS at 16:10

## 2023-07-28 ENCOUNTER — APPOINTMENT (OUTPATIENT)
Dept: GASTROENTEROLOGY | Facility: CLINIC | Age: 33
End: 2023-07-28
Payer: MEDICAID

## 2023-07-28 DIAGNOSIS — Z00.00 ENCOUNTER FOR GENERAL ADULT MEDICAL EXAMINATION W/OUT ABNORMAL FINDINGS: ICD-10-CM

## 2023-07-28 PROCEDURE — 99214 OFFICE O/P EST MOD 30 MIN: CPT | Mod: 95

## 2023-07-28 NOTE — HISTORY OF PRESENT ILLNESS
Physical Therapy Daily Treatment Note     Name: Rhys GARVEY [facility] Number: [id number]    Therapy Diagnosis:   Encounter Diagnoses   Name Primary?    Weakness of right leg     Chronic pain of right knee      Physician: Alec Appiah MD    Visit Date: 5/15/2019    Physician Orders: PT Eval and Treat; 3 months s/p R partial med/lat meniscectomies  Medical Diagnosis from Referral: M25.561,G89.29 (ICD-10-CM) - Chronic pain of right knee  Evaluation Date: 5/6/2019  Authorization Period Expiration: 12/31/2019  Plan of Care Expiration: 09/30/2019  Visit # / Visits authorized: 4/ 20      Time In: 2:00  Time Out: 3:00  Total Billable Time:25 minutes    Precautions: Standard    Subjective     Pt reports: no pain today    She was compliant with home exercise program.  Response to previous treatment: sore  Functional change: no pain/no inc swelling    Pain: 0/10  Location: right knee  and shoulder      Objective       Rhys received therapeutic exercises to develop strength, stability, endurance and ROM for 44 minutes including:     Mini squat 3 x 10- VC/TC mechanics 0-45 deg  Shuttle 4 x 10 1-2 bands DL leg press  SL heel raise 3 x 15  standing hip abd 3 x 10 GTB at knees   Bridges 3 x 15 with GTB at knees  Bridge w/ SB 2x10  Monster walks GTB at knees 1lap  Lateral 6 inch heel taps 3 x 10 jose- cuing for mechanics; some crepitus, no pain  Bike x 10 min lv 2-3      Neuro re-ed x 8 min  rebounder SLS stable x 20 anterior tosses jose  Lunge to bosu- anterior and lateral x 10 each jose     Manual x 8 minutes  - patella mobs gr 3  - infrapatellar fat pad mobilizations in various angles   - retrograde massage  -posterior/anterior tibial glides gr 3       Home Exercises Provided and Patient Education Provided     Education provided:   - Pt was educated and demonstrated good understanding of timeframe for recovery, prognosis, expectations for rehab, normal and expected soreness, activity modification/avoidance/pacing,  use of ICE/heat, load v rest ratio, anatomy/physiology of pathology, benefits of exercise, total gym use, use of bike, management swelling, acute v chronic loading with functional tasks, use patellar tendon strap      Written Home Exercises Provided: Patient instructed to cont prior HEP.  Exercises were reviewed and Rhys was able to demonstrate them prior to the end of the session.  Rhys demonstrated good  understanding of the education provided.     See EMR under Media for exercises provided prior visit.    Assessment     Pt stiven session well w/o adverse reaction. Still presents w/ edema R knee. Some knee discomfort w/ lunges but better after VC and UE assist.     Rhys is progressing well towards her goals.   Pt prognosis is Excellent.     Pt will continue to benefit from skilled outpatient physical therapy to address the deficits listed in the problem list box on initial evaluation, provide pt/family education and to maximize pt's level of independence in the home and community environment.     Pt's spiritual, cultural and educational needs considered and pt agreeable to plan of care and goals.     Anticipated barriers to physical therapy: none noted other than work schedule    GOALS: Short Term Goals:  4 weeks  1.Report decreased R knee pain  < / =  2/10  to increase tolerance for squatting/stooping. (progressing, not met)  2. Increase strength by 1/3 MMT grade in R knee/hip musculature  to increase tolerance for ADL and work activities.  (progressing, not met)  3. Pt to tolerate HEP to improve ROM and independence with ADL's  (progressing, not met)     Long Term Goals: 8 weeks  1.Report decreased R knee pain < / = 2/10  to increase tolerance for repetitive stairs on motor home.  (progressing, not met)  2.Increase strength to >/= 4+/5 in R hip/knee musculture  to increase tolerance for ADL and work activities.  (progressing, not met)  3. Pt will report at 32% limitation on FOTO knee to demonstrate  [Home] : at home, [unfilled] , at the time of the visit. increase in LE functional mobility.  (progressing, not met)  4. Pt will be able to squat to 60 deg knee flexion with good mechanics; no dynamic valgus.  (progressing, not met)      Plan     Assess response to weekend tasks-repetitive stairs and continue LE strength and balance as tolerated. Monitor swelling. See how compression/patellar strapping goes.     Mayra Wu, PTA    [Medical Office: ([facility])___] : at the medical office located in  [Verbal consent obtained from patient] : the patient, [unfilled] [FreeTextEntry1] : 07/28/23 Follow up visit\par Feels great\par No major issues\par \par Her platelet counts have been in the 400s. \par \par She gets really hot and sweaty in a way that feels new. Feels this is a new issue this year. \par \par BM 1x/ day\par Usually normal consistency\par Extremely rarely will have a speck of blood when she wipes (bright red blood\par Fatigue improved, good energy\par No myalgias or joint issues\par \par  [de-identified] : PRIOR HPI\par First dose Entyvio: Jul 15\par Last dose Entyvio: Jul 29th\par Next dose Aug 26th\par \par Prior visit:\par She was admitted to [facility] from 7/5-7/12/22 for persistent, frequent bloody BMs (15-20/day), refractory to PO steroids. GI PCR/Cdiff testing negative. s/p Flexible Sigmoidoscopy (7/6) demonstrating continuous erythematous, edematous mucosa with exudates/ulceration, loss of vascularity and spontaneous bleeding, most prominent in rectum, consistent with EDWARDS 3 disease. Advanced gastroscope to 25 cm before withdrawing scope. No retroflexion performed 2/2 erythema/edema (Biopsy). External hemorrhoids. Pathology demonstrating moderate active chronic inflammation with cryptitis in the rectum and sigmoid. Started on IV SoluMedrol 20 mg q8h while inpatient, with improvement in bowel movement frequency and resolution of BRBPR. Discharged home on Prednisone 60 mg daily. \par \par Notes since her discharge home on 7/12, she only has 1-2 formed, non-bloody BMs during the day, no longer with BMs at night. Urgency has since resolved. Still with some lingering LLQ tenderness with deep palpation but otherwise significantly improved. Arthralgias resolving. Remainder of ROS negative.  \par \par 6/20/22:\par 10 yrs ago had bleeding, no urgency. Had a colonoscopy and said it was CD. \par For 10 yrs until this Feb was on sulfasalazine 800mg. (had 'chill crohns') and occasional bleeding and would doa canasa suppository and would get better. \par \par in Feb started having blood with every BM. Was having urgency and still had bleeding every time. Had a colonoscopy on May 9th and told it was UC at [place] with Dr. Geronimo Obrien (Edwards 2 disease activity). Was on budesonide and hydrocortisone enemas with no improvement for piror to colonoscopy. During the day bad, but could put it in at night and felt ok. Felt depleted and usually could still take subway and live normal life. After colonoscopy Switched her to mesalamine 0.375 2 pills twice a day, continued hydrocortisone enemas. But got to thep oint where put enema in and it wouldn't stay in. Stopped that but stayed on mesalamine. \par \par Urgency getting worse. For the past 2 week going 20x/day. Waking up at night. \par Has no energy. Lying down all day and is in the bathroom. Can't live normally. \par \par She has never been told she had a fistula or a stricture. \par Crohns diagnosed at [facility] with Dr. Ramires (was scattered inflammation throughout). He wrote down diagnosis of UC however. \par \par Since Feb has been having blood every time. \par Recently over the past 2 weeks having tons of BMs, even worse. \par \par Had a period waking up at night feeling feverish. Stopped getting that. Also having "sore eyes". No anterior uveitis, joint pains, rashes. \par \par Has never been on prednisone in the past. \par \par Started cholestyramine because of diarrhea. # Bms went up, did not help. \par \par no abdominal pain right now. Sometimes very uncomfortable feeling. \par Feels queasy often but never thrown up. \par Will oftenonly want to eat pretzels. \par \par Has lost 10-15 lbs since Feb 2022. the past few weeks weightloss accelerated. \par \par BMs are loose but brown. \par Sometimes a little mcuus, sometimes when she wipes sees mucus. 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

## 2023-07-28 NOTE — ASSESSMENT
[FreeTextEntry1] : 34 yo F PMH liver sarcoma s/p chemotherapy (age 16, history of indeterminate colitis (initially diagnosed with CD and later was told diagnosis was UC; previously on sulfasalazine for many years, recently with flare and started on budesonide with minimal response; more recently on mesalamine and hydrocortisone suppositories with minimal improvement) with no response, recent hospitalization to [facility] (7/5-7/12) for UC flare, improved with IV steroids, now here for follow up, doing well on entyvio. \par \par #Indeterminate Colitis\par Patient history of indeterminate colitis, initially diagnosed as CD, then later told it was UC, previously on sulfasalazine for many years, recently with flare and started on budesonide with minimal response; more recently on mesalamine and hydrocortisone suppositories with minimal improvement) with no response, recent hospitalization to [facility] (7/5-7/12) for UC flare, 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 (7/6), improved with IV steroids, now here for follow up on entyvio, doing well. \par -request records from [facility] re: prior work up, clinic notes to get clarity on prior diagnosis of CD vs UC, as well as recent colonoscopy on May 9th results (per patient c/w 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)\par - Can consider CTE/MRE for small bowel evaluation in the future (per patient no prior small bowel dedicated imaging or vce done)\par - Reviewed recent labs April 2023; VDZ level 15.2 and CRP slightly elevated to 4\par - Continue on Entyvio\par - Repeat colonoscopy for evaluation of mucosal healing (prior sigmoidoscopy July 2022)\par \par #Weight gain while on steroids\par - asking about ozempic (reviewed possible side effects). \par - Will confirm that colitis is in remission with colonoscopy prior to proceeding, then patient can follow up with an endocrinologist to discuss this medication\par \par Reportedly with labs from may 2023 with increased metamyelocytes, platelets in 400s\par - plan for colonoscopy to assess for colonic inflammation\par - can consider referral to hematologist if persistent elevation\par \par Hot flashes \par - unclear if possible side effect from entyvio (unlikely to be from steroids since they have been off for a period of time) \par - continue to monitor\par - periods have been regular\par \par #HCM:\par - UTD with vacination\par - Annual skin exam utd\par - Annual GYN exam utd\par - Dexa scan (ordered today)

## 2023-08-01 ENCOUNTER — OUTPATIENT (OUTPATIENT)
Dept: OUTPATIENT SERVICES | Facility: HOSPITAL | Age: 33
LOS: 1 days | End: 2023-08-01
Payer: MEDICAID

## 2023-08-01 ENCOUNTER — APPOINTMENT (OUTPATIENT)
Dept: INFUSION THERAPY | Facility: CLINIC | Age: 33
End: 2023-08-01

## 2023-08-01 VITALS
HEIGHT: 67 IN | HEART RATE: 100 BPM | OXYGEN SATURATION: 97 % | RESPIRATION RATE: 18 BRPM | WEIGHT: 195.11 LBS | SYSTOLIC BLOOD PRESSURE: 98 MMHG | TEMPERATURE: 98 F | DIASTOLIC BLOOD PRESSURE: 62 MMHG

## 2023-08-01 VITALS
DIASTOLIC BLOOD PRESSURE: 62 MMHG | SYSTOLIC BLOOD PRESSURE: 98 MMHG | HEIGHT: 67 IN | HEART RATE: 100 BPM | OXYGEN SATURATION: 97 % | RESPIRATION RATE: 18 BRPM | WEIGHT: 195.11 LBS

## 2023-08-01 DIAGNOSIS — K50.90 CROHN'S DISEASE, UNSPECIFIED, WITHOUT COMPLICATIONS: ICD-10-CM

## 2023-08-01 LAB
ALBUMIN SERPL ELPH-MCNC: 4.7 G/DL — SIGNIFICANT CHANGE UP (ref 3.3–5)
ALP SERPL-CCNC: 85 U/L — SIGNIFICANT CHANGE UP (ref 40–120)
ALT FLD-CCNC: 27 U/L — SIGNIFICANT CHANGE UP (ref 10–45)
ANION GAP SERPL CALC-SCNC: 16 MMOL/L — SIGNIFICANT CHANGE UP (ref 5–17)
AST SERPL-CCNC: 24 U/L — SIGNIFICANT CHANGE UP (ref 10–40)
BILIRUB SERPL-MCNC: 0.3 MG/DL — SIGNIFICANT CHANGE UP (ref 0.2–1.2)
BUN SERPL-MCNC: 13 MG/DL — SIGNIFICANT CHANGE UP (ref 7–23)
CALCIUM SERPL-MCNC: 9.5 MG/DL — SIGNIFICANT CHANGE UP (ref 8.4–10.5)
CHLORIDE SERPL-SCNC: 100 MMOL/L — SIGNIFICANT CHANGE UP (ref 96–108)
CO2 SERPL-SCNC: 23 MMOL/L — SIGNIFICANT CHANGE UP (ref 22–31)
CREAT SERPL-MCNC: 0.89 MG/DL — SIGNIFICANT CHANGE UP (ref 0.5–1.3)
CRP SERPL-MCNC: 4.5 MG/L — HIGH (ref 0–4)
EGFR: 88 ML/MIN/1.73M2 — SIGNIFICANT CHANGE UP
GLUCOSE SERPL-MCNC: 150 MG/DL — HIGH (ref 70–99)
HCT VFR BLD CALC: 43.4 % — SIGNIFICANT CHANGE UP (ref 34.5–45)
HGB BLD-MCNC: 14.4 G/DL — SIGNIFICANT CHANGE UP (ref 11.5–15.5)
MCHC RBC-ENTMCNC: 28.6 PG — SIGNIFICANT CHANGE UP (ref 27–34)
MCHC RBC-ENTMCNC: 33.2 GM/DL — SIGNIFICANT CHANGE UP (ref 32–36)
MCV RBC AUTO: 86.3 FL — SIGNIFICANT CHANGE UP (ref 80–100)
NRBC # BLD: 0 /100 WBCS — SIGNIFICANT CHANGE UP (ref 0–0)
PLATELET # BLD AUTO: 524 K/UL — HIGH (ref 150–400)
POTASSIUM SERPL-MCNC: 3.5 MMOL/L — SIGNIFICANT CHANGE UP (ref 3.5–5.3)
POTASSIUM SERPL-SCNC: 3.5 MMOL/L — SIGNIFICANT CHANGE UP (ref 3.5–5.3)
PROT SERPL-MCNC: 7.8 G/DL — SIGNIFICANT CHANGE UP (ref 6–8.3)
RBC # BLD: 5.03 M/UL — SIGNIFICANT CHANGE UP (ref 3.8–5.2)
RBC # FLD: 13.6 % — SIGNIFICANT CHANGE UP (ref 10.3–14.5)
SODIUM SERPL-SCNC: 139 MMOL/L — SIGNIFICANT CHANGE UP (ref 135–145)
WBC # BLD: 11.76 K/UL — HIGH (ref 3.8–10.5)
WBC # FLD AUTO: 11.76 K/UL — HIGH (ref 3.8–10.5)

## 2023-08-01 PROCEDURE — 86140 C-REACTIVE PROTEIN: CPT

## 2023-08-01 PROCEDURE — 36415 COLL VENOUS BLD VENIPUNCTURE: CPT

## 2023-08-01 PROCEDURE — 80053 COMPREHEN METABOLIC PANEL: CPT

## 2023-08-01 PROCEDURE — 85027 COMPLETE CBC AUTOMATED: CPT

## 2023-08-01 PROCEDURE — 96413 CHEMO IV INFUSION 1 HR: CPT

## 2023-08-01 RX ORDER — VEDOLIZUMAB 108 MG/.68ML
300 INJECTION, SOLUTION SUBCUTANEOUS ONCE
Refills: 0 | Status: COMPLETED | OUTPATIENT
Start: 2023-08-01 | End: 2023-08-01

## 2023-08-01 RX ADMIN — VEDOLIZUMAB 500 MILLIGRAM(S): 108 INJECTION, SOLUTION SUBCUTANEOUS at 14:52

## 2023-08-01 RX ADMIN — VEDOLIZUMAB 300 MILLIGRAM(S): 108 INJECTION, SOLUTION SUBCUTANEOUS at 15:25

## 2023-09-25 ENCOUNTER — OUTPATIENT (OUTPATIENT)
Dept: OUTPATIENT SERVICES | Facility: HOSPITAL | Age: 33
LOS: 1 days | End: 2023-09-25

## 2023-09-25 ENCOUNTER — APPOINTMENT (OUTPATIENT)
Dept: RADIOLOGY | Facility: CLINIC | Age: 33
End: 2023-09-25
Payer: MEDICAID

## 2023-09-25 PROCEDURE — 77085 DXA BONE DENSITY AXL VRT FX: CPT | Mod: 26

## 2023-09-26 ENCOUNTER — APPOINTMENT (OUTPATIENT)
Dept: INFUSION THERAPY | Facility: CLINIC | Age: 33
End: 2023-09-26

## 2023-09-26 ENCOUNTER — OUTPATIENT (OUTPATIENT)
Dept: OUTPATIENT SERVICES | Facility: HOSPITAL | Age: 33
LOS: 1 days | End: 2023-09-26
Payer: MEDICAID

## 2023-09-26 VITALS
DIASTOLIC BLOOD PRESSURE: 68 MMHG | RESPIRATION RATE: 18 BRPM | OXYGEN SATURATION: 99 % | SYSTOLIC BLOOD PRESSURE: 98 MMHG | HEART RATE: 89 BPM | TEMPERATURE: 98 F

## 2023-09-26 VITALS
TEMPERATURE: 97 F | OXYGEN SATURATION: 99 % | DIASTOLIC BLOOD PRESSURE: 70 MMHG | SYSTOLIC BLOOD PRESSURE: 100 MMHG | HEART RATE: 89 BPM | RESPIRATION RATE: 18 BRPM

## 2023-09-26 DIAGNOSIS — K50.90 CROHN'S DISEASE, UNSPECIFIED, WITHOUT COMPLICATIONS: ICD-10-CM

## 2023-09-26 LAB
CRP SERPL-MCNC: 7.1 MG/L — HIGH (ref 0–4)
HCT VFR BLD CALC: 39.1 % — SIGNIFICANT CHANGE UP (ref 34.5–45)
HGB BLD-MCNC: 13.1 G/DL — SIGNIFICANT CHANGE UP (ref 11.5–15.5)
MCHC RBC-ENTMCNC: 28.4 PG — SIGNIFICANT CHANGE UP (ref 27–34)
MCHC RBC-ENTMCNC: 33.5 GM/DL — SIGNIFICANT CHANGE UP (ref 32–36)
MCV RBC AUTO: 84.6 FL — SIGNIFICANT CHANGE UP (ref 80–100)
NRBC # BLD: 0 /100 WBCS — SIGNIFICANT CHANGE UP (ref 0–0)
PLATELET # BLD AUTO: 518 K/UL — HIGH (ref 150–400)
RBC # BLD: 4.62 M/UL — SIGNIFICANT CHANGE UP (ref 3.8–5.2)
RBC # FLD: 13.8 % — SIGNIFICANT CHANGE UP (ref 10.3–14.5)
WBC # BLD: 10.28 K/UL — SIGNIFICANT CHANGE UP (ref 3.8–10.5)
WBC # FLD AUTO: 10.28 K/UL — SIGNIFICANT CHANGE UP (ref 3.8–10.5)

## 2023-09-26 PROCEDURE — 86140 C-REACTIVE PROTEIN: CPT

## 2023-09-26 PROCEDURE — 96413 CHEMO IV INFUSION 1 HR: CPT

## 2023-09-26 PROCEDURE — 85027 COMPLETE CBC AUTOMATED: CPT

## 2023-09-26 PROCEDURE — 36415 COLL VENOUS BLD VENIPUNCTURE: CPT

## 2023-09-26 PROCEDURE — 80053 COMPREHEN METABOLIC PANEL: CPT

## 2023-09-26 RX ORDER — VEDOLIZUMAB 108 MG/.68ML
300 INJECTION, SOLUTION SUBCUTANEOUS ONCE
Refills: 0 | Status: COMPLETED | OUTPATIENT
Start: 2023-09-26 | End: 2023-09-26

## 2023-09-26 RX ADMIN — VEDOLIZUMAB 300 MILLIGRAM(S): 108 INJECTION, SOLUTION SUBCUTANEOUS at 14:55

## 2023-09-26 RX ADMIN — VEDOLIZUMAB 500 MILLIGRAM(S): 108 INJECTION, SOLUTION SUBCUTANEOUS at 14:25

## 2023-09-27 LAB
ALBUMIN SERPL ELPH-MCNC: 4.4 G/DL — SIGNIFICANT CHANGE UP (ref 3.3–5)
ALP SERPL-CCNC: 97 U/L — SIGNIFICANT CHANGE UP (ref 40–120)
ALT FLD-CCNC: 40 U/L — SIGNIFICANT CHANGE UP (ref 10–45)
ANION GAP SERPL CALC-SCNC: 14 MMOL/L — SIGNIFICANT CHANGE UP (ref 5–17)
AST SERPL-CCNC: 34 U/L — SIGNIFICANT CHANGE UP (ref 10–40)
BILIRUB SERPL-MCNC: 0.2 MG/DL — SIGNIFICANT CHANGE UP (ref 0.2–1.2)
BUN SERPL-MCNC: 14 MG/DL — SIGNIFICANT CHANGE UP (ref 7–23)
CALCIUM SERPL-MCNC: 9.9 MG/DL — SIGNIFICANT CHANGE UP (ref 8.4–10.5)
CHLORIDE SERPL-SCNC: 102 MMOL/L — SIGNIFICANT CHANGE UP (ref 96–108)
CO2 SERPL-SCNC: 23 MMOL/L — SIGNIFICANT CHANGE UP (ref 22–31)
CREAT SERPL-MCNC: 0.84 MG/DL — SIGNIFICANT CHANGE UP (ref 0.5–1.3)
EGFR: 94 ML/MIN/1.73M2 — SIGNIFICANT CHANGE UP
GLUCOSE SERPL-MCNC: 86 MG/DL — SIGNIFICANT CHANGE UP (ref 70–99)
POTASSIUM SERPL-MCNC: 4.6 MMOL/L — SIGNIFICANT CHANGE UP (ref 3.5–5.3)
POTASSIUM SERPL-SCNC: 4.6 MMOL/L — SIGNIFICANT CHANGE UP (ref 3.5–5.3)
PROT SERPL-MCNC: 7.6 G/DL — SIGNIFICANT CHANGE UP (ref 6–8.3)
SODIUM SERPL-SCNC: 139 MMOL/L — SIGNIFICANT CHANGE UP (ref 135–145)

## 2023-09-30 ENCOUNTER — TRANSCRIPTION ENCOUNTER (OUTPATIENT)
Age: 33
End: 2023-09-30

## 2023-10-16 ENCOUNTER — RESULT REVIEW (OUTPATIENT)
Age: 33
End: 2023-10-16

## 2023-10-16 ENCOUNTER — APPOINTMENT (OUTPATIENT)
Dept: GASTROENTEROLOGY | Facility: AMBULATORY SURGERY CENTER | Age: 33
End: 2023-10-16
Payer: MEDICAID

## 2023-10-16 PROCEDURE — 45380 COLONOSCOPY AND BIOPSY: CPT

## 2023-10-25 ENCOUNTER — APPOINTMENT (OUTPATIENT)
Dept: ENDOCRINOLOGY | Facility: CLINIC | Age: 33
End: 2023-10-25
Payer: MEDICAID

## 2023-10-25 ENCOUNTER — NON-APPOINTMENT (OUTPATIENT)
Age: 33
End: 2023-10-25

## 2023-10-25 VITALS
OXYGEN SATURATION: 96 % | WEIGHT: 209 LBS | DIASTOLIC BLOOD PRESSURE: 83 MMHG | BODY MASS INDEX: 32.8 KG/M2 | HEART RATE: 94 BPM | SYSTOLIC BLOOD PRESSURE: 117 MMHG | HEIGHT: 67 IN | TEMPERATURE: 97.4 F

## 2023-10-25 VITALS — WEIGHT: 209 LBS | BODY MASS INDEX: 32.8 KG/M2 | HEIGHT: 67 IN

## 2023-10-25 DIAGNOSIS — Z85.831 PERSONAL HISTORY OF MALIGNANT NEOPLASM OF SOFT TISSUE: ICD-10-CM

## 2023-10-25 DIAGNOSIS — R63.5 ABNORMAL WEIGHT GAIN: ICD-10-CM

## 2023-10-25 PROCEDURE — 99204 OFFICE O/P NEW MOD 45 MIN: CPT

## 2023-10-25 RX ORDER — BUPROPION HYDROCHLORIDE 200 MG/1
200 TABLET, FILM COATED ORAL
Refills: 0 | Status: ACTIVE | COMMUNITY

## 2023-10-25 RX ORDER — SULFASALAZINE 500 MG/1
TABLET ORAL
Refills: 0 | Status: DISCONTINUED | COMMUNITY
End: 2023-10-25

## 2023-10-25 RX ORDER — MESALAMINE 0.38 G/1
0.38 CAPSULE, EXTENDED RELEASE ORAL
Qty: 120 | Refills: 3 | Status: DISCONTINUED | COMMUNITY
Start: 2022-06-28 | End: 2023-10-25

## 2023-10-25 RX ORDER — BUPROPION HCL 100 MG
100 TABLET,SUSTAINED-RELEASE 12 HR ORAL
Refills: 0 | Status: ACTIVE | COMMUNITY

## 2023-10-25 RX ORDER — PREDNISONE 20 MG/1
20 TABLET ORAL
Qty: 100 | Refills: 0 | Status: DISCONTINUED | COMMUNITY
Start: 2022-06-24 | End: 2023-10-25

## 2023-11-21 ENCOUNTER — OUTPATIENT (OUTPATIENT)
Dept: OUTPATIENT SERVICES | Facility: HOSPITAL | Age: 33
LOS: 1 days | End: 2023-11-21
Payer: MEDICAID

## 2023-11-21 ENCOUNTER — APPOINTMENT (OUTPATIENT)
Dept: INFUSION THERAPY | Facility: CLINIC | Age: 33
End: 2023-11-21

## 2023-11-21 VITALS
OXYGEN SATURATION: 98 % | TEMPERATURE: 99 F | SYSTOLIC BLOOD PRESSURE: 100 MMHG | WEIGHT: 192.9 LBS | RESPIRATION RATE: 16 BRPM | HEIGHT: 67 IN | HEART RATE: 90 BPM | DIASTOLIC BLOOD PRESSURE: 67 MMHG

## 2023-11-21 VITALS
TEMPERATURE: 98 F | SYSTOLIC BLOOD PRESSURE: 102 MMHG | OXYGEN SATURATION: 99 % | DIASTOLIC BLOOD PRESSURE: 66 MMHG | RESPIRATION RATE: 16 BRPM | HEART RATE: 78 BPM

## 2023-11-21 DIAGNOSIS — K50.90 CROHN'S DISEASE, UNSPECIFIED, WITHOUT COMPLICATIONS: ICD-10-CM

## 2023-11-21 LAB
ALBUMIN SERPL ELPH-MCNC: 4.1 G/DL — SIGNIFICANT CHANGE UP (ref 3.3–5)
ALBUMIN SERPL ELPH-MCNC: 4.1 G/DL — SIGNIFICANT CHANGE UP (ref 3.3–5)
ALP SERPL-CCNC: 100 U/L — SIGNIFICANT CHANGE UP (ref 40–120)
ALP SERPL-CCNC: 100 U/L — SIGNIFICANT CHANGE UP (ref 40–120)
ALT FLD-CCNC: 29 U/L — SIGNIFICANT CHANGE UP (ref 10–45)
ALT FLD-CCNC: 29 U/L — SIGNIFICANT CHANGE UP (ref 10–45)
ANION GAP SERPL CALC-SCNC: 9 MMOL/L — SIGNIFICANT CHANGE UP (ref 5–17)
ANION GAP SERPL CALC-SCNC: 9 MMOL/L — SIGNIFICANT CHANGE UP (ref 5–17)
AST SERPL-CCNC: 27 U/L — SIGNIFICANT CHANGE UP (ref 10–40)
AST SERPL-CCNC: 27 U/L — SIGNIFICANT CHANGE UP (ref 10–40)
BILIRUB SERPL-MCNC: 0.2 MG/DL — SIGNIFICANT CHANGE UP (ref 0.2–1.2)
BILIRUB SERPL-MCNC: 0.2 MG/DL — SIGNIFICANT CHANGE UP (ref 0.2–1.2)
BUN SERPL-MCNC: 12 MG/DL — SIGNIFICANT CHANGE UP (ref 7–23)
BUN SERPL-MCNC: 12 MG/DL — SIGNIFICANT CHANGE UP (ref 7–23)
CALCIUM SERPL-MCNC: 9.2 MG/DL — SIGNIFICANT CHANGE UP (ref 8.4–10.5)
CALCIUM SERPL-MCNC: 9.2 MG/DL — SIGNIFICANT CHANGE UP (ref 8.4–10.5)
CHLORIDE SERPL-SCNC: 105 MMOL/L — SIGNIFICANT CHANGE UP (ref 96–108)
CHLORIDE SERPL-SCNC: 105 MMOL/L — SIGNIFICANT CHANGE UP (ref 96–108)
CO2 SERPL-SCNC: 26 MMOL/L — SIGNIFICANT CHANGE UP (ref 22–31)
CO2 SERPL-SCNC: 26 MMOL/L — SIGNIFICANT CHANGE UP (ref 22–31)
CREAT SERPL-MCNC: 0.86 MG/DL — SIGNIFICANT CHANGE UP (ref 0.5–1.3)
CREAT SERPL-MCNC: 0.86 MG/DL — SIGNIFICANT CHANGE UP (ref 0.5–1.3)
CRP SERPL-MCNC: 6 MG/L — HIGH (ref 0–4)
CRP SERPL-MCNC: 6 MG/L — HIGH (ref 0–4)
EGFR: 91 ML/MIN/1.73M2 — SIGNIFICANT CHANGE UP
EGFR: 91 ML/MIN/1.73M2 — SIGNIFICANT CHANGE UP
GLUCOSE SERPL-MCNC: 93 MG/DL — SIGNIFICANT CHANGE UP (ref 70–99)
GLUCOSE SERPL-MCNC: 93 MG/DL — SIGNIFICANT CHANGE UP (ref 70–99)
HCT VFR BLD CALC: 38.9 % — SIGNIFICANT CHANGE UP (ref 34.5–45)
HCT VFR BLD CALC: 38.9 % — SIGNIFICANT CHANGE UP (ref 34.5–45)
HGB BLD-MCNC: 12.8 G/DL — SIGNIFICANT CHANGE UP (ref 11.5–15.5)
HGB BLD-MCNC: 12.8 G/DL — SIGNIFICANT CHANGE UP (ref 11.5–15.5)
MCHC RBC-ENTMCNC: 27.7 PG — SIGNIFICANT CHANGE UP (ref 27–34)
MCHC RBC-ENTMCNC: 27.7 PG — SIGNIFICANT CHANGE UP (ref 27–34)
MCHC RBC-ENTMCNC: 32.9 GM/DL — SIGNIFICANT CHANGE UP (ref 32–36)
MCHC RBC-ENTMCNC: 32.9 GM/DL — SIGNIFICANT CHANGE UP (ref 32–36)
MCV RBC AUTO: 84.2 FL — SIGNIFICANT CHANGE UP (ref 80–100)
MCV RBC AUTO: 84.2 FL — SIGNIFICANT CHANGE UP (ref 80–100)
NRBC # BLD: 0 /100 WBCS — SIGNIFICANT CHANGE UP (ref 0–0)
NRBC # BLD: 0 /100 WBCS — SIGNIFICANT CHANGE UP (ref 0–0)
PLATELET # BLD AUTO: 509 K/UL — HIGH (ref 150–400)
PLATELET # BLD AUTO: 509 K/UL — HIGH (ref 150–400)
POTASSIUM SERPL-MCNC: 4.2 MMOL/L — SIGNIFICANT CHANGE UP (ref 3.5–5.3)
POTASSIUM SERPL-MCNC: 4.2 MMOL/L — SIGNIFICANT CHANGE UP (ref 3.5–5.3)
POTASSIUM SERPL-SCNC: 4.2 MMOL/L — SIGNIFICANT CHANGE UP (ref 3.5–5.3)
POTASSIUM SERPL-SCNC: 4.2 MMOL/L — SIGNIFICANT CHANGE UP (ref 3.5–5.3)
PROT SERPL-MCNC: 7.5 G/DL — SIGNIFICANT CHANGE UP (ref 6–8.3)
PROT SERPL-MCNC: 7.5 G/DL — SIGNIFICANT CHANGE UP (ref 6–8.3)
RBC # BLD: 4.62 M/UL — SIGNIFICANT CHANGE UP (ref 3.8–5.2)
RBC # BLD: 4.62 M/UL — SIGNIFICANT CHANGE UP (ref 3.8–5.2)
RBC # FLD: 14.2 % — SIGNIFICANT CHANGE UP (ref 10.3–14.5)
RBC # FLD: 14.2 % — SIGNIFICANT CHANGE UP (ref 10.3–14.5)
SODIUM SERPL-SCNC: 140 MMOL/L — SIGNIFICANT CHANGE UP (ref 135–145)
SODIUM SERPL-SCNC: 140 MMOL/L — SIGNIFICANT CHANGE UP (ref 135–145)
WBC # BLD: 10.41 K/UL — SIGNIFICANT CHANGE UP (ref 3.8–10.5)
WBC # BLD: 10.41 K/UL — SIGNIFICANT CHANGE UP (ref 3.8–10.5)
WBC # FLD AUTO: 10.41 K/UL — SIGNIFICANT CHANGE UP (ref 3.8–10.5)
WBC # FLD AUTO: 10.41 K/UL — SIGNIFICANT CHANGE UP (ref 3.8–10.5)

## 2023-11-21 PROCEDURE — 96413 CHEMO IV INFUSION 1 HR: CPT

## 2023-11-21 PROCEDURE — 86140 C-REACTIVE PROTEIN: CPT

## 2023-11-21 PROCEDURE — 85027 COMPLETE CBC AUTOMATED: CPT

## 2023-11-21 PROCEDURE — 80053 COMPREHEN METABOLIC PANEL: CPT

## 2023-11-21 PROCEDURE — 36415 COLL VENOUS BLD VENIPUNCTURE: CPT

## 2023-11-21 RX ORDER — VEDOLIZUMAB 108 MG/.68ML
300 INJECTION, SOLUTION SUBCUTANEOUS ONCE
Refills: 0 | Status: COMPLETED | OUTPATIENT
Start: 2023-11-21 | End: 2023-11-21

## 2023-11-21 RX ADMIN — VEDOLIZUMAB 300 MILLIGRAM(S): 108 INJECTION, SOLUTION SUBCUTANEOUS at 15:10

## 2023-11-21 RX ADMIN — VEDOLIZUMAB 500 MILLIGRAM(S): 108 INJECTION, SOLUTION SUBCUTANEOUS at 14:36

## 2023-11-29 ENCOUNTER — APPOINTMENT (OUTPATIENT)
Dept: GASTROENTEROLOGY | Facility: CLINIC | Age: 33
End: 2023-11-29
Payer: MEDICAID

## 2023-11-29 PROCEDURE — 99214 OFFICE O/P EST MOD 30 MIN: CPT | Mod: 95

## 2023-12-08 ENCOUNTER — NON-APPOINTMENT (OUTPATIENT)
Age: 33
End: 2023-12-08

## 2024-01-16 ENCOUNTER — OUTPATIENT (OUTPATIENT)
Dept: OUTPATIENT SERVICES | Facility: HOSPITAL | Age: 34
LOS: 1 days | End: 2024-01-16
Payer: MEDICAID

## 2024-01-16 ENCOUNTER — APPOINTMENT (OUTPATIENT)
Dept: INFUSION THERAPY | Facility: CLINIC | Age: 34
End: 2024-01-16

## 2024-01-16 VITALS
OXYGEN SATURATION: 96 % | SYSTOLIC BLOOD PRESSURE: 115 MMHG | RESPIRATION RATE: 16 BRPM | TEMPERATURE: 98 F | HEART RATE: 88 BPM | DIASTOLIC BLOOD PRESSURE: 70 MMHG

## 2024-01-16 DIAGNOSIS — K50.90 CROHN'S DISEASE, UNSPECIFIED, WITHOUT COMPLICATIONS: ICD-10-CM

## 2024-01-16 LAB
ALBUMIN SERPL ELPH-MCNC: 4.2 G/DL — SIGNIFICANT CHANGE UP (ref 3.3–5)
ALP SERPL-CCNC: 91 U/L — SIGNIFICANT CHANGE UP (ref 40–120)
ALT FLD-CCNC: 32 U/L — SIGNIFICANT CHANGE UP (ref 10–45)
ANION GAP SERPL CALC-SCNC: 10 MMOL/L — SIGNIFICANT CHANGE UP (ref 5–17)
AST SERPL-CCNC: 23 U/L — SIGNIFICANT CHANGE UP (ref 10–40)
BILIRUB SERPL-MCNC: 0.2 MG/DL — SIGNIFICANT CHANGE UP (ref 0.2–1.2)
BUN SERPL-MCNC: 15 MG/DL — SIGNIFICANT CHANGE UP (ref 7–23)
CALCIUM SERPL-MCNC: 9.1 MG/DL — SIGNIFICANT CHANGE UP (ref 8.4–10.5)
CHLORIDE SERPL-SCNC: 103 MMOL/L — SIGNIFICANT CHANGE UP (ref 96–108)
CO2 SERPL-SCNC: 27 MMOL/L — SIGNIFICANT CHANGE UP (ref 22–31)
CREAT SERPL-MCNC: 0.87 MG/DL — SIGNIFICANT CHANGE UP (ref 0.5–1.3)
CRP SERPL-MCNC: 5.2 MG/L — HIGH (ref 0–4)
EGFR: 90 ML/MIN/1.73M2 — SIGNIFICANT CHANGE UP
GLUCOSE SERPL-MCNC: 95 MG/DL — SIGNIFICANT CHANGE UP (ref 70–99)
HCT VFR BLD CALC: 39.1 % — SIGNIFICANT CHANGE UP (ref 34.5–45)
HGB BLD-MCNC: 13.2 G/DL — SIGNIFICANT CHANGE UP (ref 11.5–15.5)
MCHC RBC-ENTMCNC: 27.7 PG — SIGNIFICANT CHANGE UP (ref 27–34)
MCHC RBC-ENTMCNC: 33.8 GM/DL — SIGNIFICANT CHANGE UP (ref 32–36)
MCV RBC AUTO: 82 FL — SIGNIFICANT CHANGE UP (ref 80–100)
NRBC # BLD: 0 /100 WBCS — SIGNIFICANT CHANGE UP (ref 0–0)
PLATELET # BLD AUTO: 582 K/UL — HIGH (ref 150–400)
POTASSIUM SERPL-MCNC: 3.8 MMOL/L — SIGNIFICANT CHANGE UP (ref 3.5–5.3)
POTASSIUM SERPL-SCNC: 3.8 MMOL/L — SIGNIFICANT CHANGE UP (ref 3.5–5.3)
PROT SERPL-MCNC: 7.6 G/DL — SIGNIFICANT CHANGE UP (ref 6–8.3)
RBC # BLD: 4.77 M/UL — SIGNIFICANT CHANGE UP (ref 3.8–5.2)
RBC # FLD: 13.9 % — SIGNIFICANT CHANGE UP (ref 10.3–14.5)
SODIUM SERPL-SCNC: 140 MMOL/L — SIGNIFICANT CHANGE UP (ref 135–145)
WBC # BLD: 10.92 K/UL — HIGH (ref 3.8–10.5)
WBC # FLD AUTO: 10.92 K/UL — HIGH (ref 3.8–10.5)

## 2024-01-16 PROCEDURE — 85027 COMPLETE CBC AUTOMATED: CPT

## 2024-01-16 PROCEDURE — 36415 COLL VENOUS BLD VENIPUNCTURE: CPT

## 2024-01-16 PROCEDURE — 96413 CHEMO IV INFUSION 1 HR: CPT

## 2024-01-16 PROCEDURE — 86140 C-REACTIVE PROTEIN: CPT

## 2024-01-16 PROCEDURE — 80053 COMPREHEN METABOLIC PANEL: CPT

## 2024-01-16 RX ORDER — VEDOLIZUMAB 108 MG/.68ML
300 INJECTION, SOLUTION SUBCUTANEOUS ONCE
Refills: 0 | Status: COMPLETED | OUTPATIENT
Start: 2024-01-16 | End: 2024-01-16

## 2024-01-16 RX ADMIN — VEDOLIZUMAB 500 MILLIGRAM(S): 108 INJECTION, SOLUTION SUBCUTANEOUS at 16:53

## 2024-01-17 ENCOUNTER — APPOINTMENT (OUTPATIENT)
Dept: ENDOCRINOLOGY | Facility: CLINIC | Age: 34
End: 2024-01-17

## 2024-02-13 ENCOUNTER — APPOINTMENT (OUTPATIENT)
Dept: GASTROENTEROLOGY | Facility: HOSPITAL | Age: 34
End: 2024-02-13
Payer: MEDICAID

## 2024-02-13 PROCEDURE — 99215 OFFICE O/P EST HI 40 MIN: CPT

## 2024-02-13 PROCEDURE — G2211 COMPLEX E/M VISIT ADD ON: CPT | Mod: NC,1L,95

## 2024-02-13 NOTE — ASSESSMENT
[FreeTextEntry1] : 34 yo F PMH liver sarcoma s/p chemotherapy (age 16, history of indeterminate colitis diagnosed in 2012 (initially diagnosed with CD and later was told diagnosis was UC; previously on sulfasalazine for many years, recently with flare and started on budesonide with minimal response; more recently on mesalamine and hydrocortisone suppositories with minimal improvement) with no response, now in deep remission on entyvio who presents for follow up.   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 Patient history of indeterminate colitis, initially diagnosed as CD, then later told it was UC, previously on sulfasalazine for many years, recently with flare and started on budesonide with minimal response; more recently on mesalamine and hydrocortisone suppositories with minimal improvement) with no response, recent hospitalization to [facility] (7/5-7/12) for UC flare, DAVIDSON 3 disease on flex sig (7/6), improved with IV steroids, now in deep remission on entyvio  - recent colonoscopy on Oct 16, 2023 with endoscopic and histologic remission - Plan for MRE for small bowel evaluation (per patient no prior small bowel dedicated imaging or vce done) - Reviewed recent labs April 2023; CRP persistently elevated to 5s, WBC 10, platelets remain elevated - Continue on Entyvio, consider switching to SQ entyvio, addtionally would consider repeat colonoscopy in 1 yr to ensure continued remission after starting (patient wishes to defer at this time given some mild flare symptoms 1 mo ago)   #Weight gain while on steroids - referred to endocrinology prior visit, working on diet and lifestyle changes  Reportedly with labs from may 2023 with increased metamyelocytes, platelets in 400s - referral to hematologist  #HCM: - UTD with vaccination - Annual skin exam utd - Annual GYN exam utd - DEXA scan (bone density is within the expected range for the patient's age, spinal fracture analysis shows no vertebral body deformity) September '23 - currently in surveillance, next colonoscopy in 1-3 yrs (given deep remission)  Follow up in 3 mos.  Summary today's visit MRE referral to heme for thrombocytosis that has been persistent can consider SQ vedo at future visit, for now will remain on IV infusions

## 2024-02-13 NOTE — REASON FOR VISIT
[Home] : at home, [unfilled] , at the time of the visit. [Medical Office: ([facility])___] : at the medical office located in  [Patient] : the patient [Self] : self

## 2024-02-13 NOTE — HISTORY OF PRESENT ILLNESS
[FreeTextEntry1] : 32 yo F PMH liver sarcoma s/p chemotherapy (age 16, history of indeterminate colitis diagnosed in 2012 (initially diagnosed with CD and later was told diagnosis was UC; previously on sulfasalazine for many years, recently with flare and started on budesonide with minimal response; more recently on mesalamine and hydrocortisone suppositories with minimal improvement) with no response, presents for follow up, doing well on entyvio.  2/13/'24 End of January had some urgency, but otherwise feels fine.   11/29/2023 Follow up visit Since prior visit underwent colonoscopy with evidence of endoscopic and histologic remission  Feels great No major issues  Her platelet counts have been in the 400s, recently 500s  BM 1x/ day Usually normal consistency Extremely rarely will have a speck of blood when she wipes (bright red blood Fatigue improved, good energy No myalgias or joint issues Gastroenterology Summary   Colonoscopy: October 16, 2023 Colonoscopy Normal mucosa TI, normal mucosa colon, external hemorrhoids  Final Diagnosis 1. Terminal ileum: - Ileal mucosa with well-formed villi and within normal limits 2. Ascending colon: - Colonic mucosa within normal limits 3. Transverse colon: - Colonic mucosa within normal limits 4. Left colon: - Colonic mucosa within normal limits.

## 2024-03-08 RX ORDER — VEDOLIZUMAB 108 MG/.68ML
300 INJECTION, SOLUTION SUBCUTANEOUS ONCE
Refills: 0 | Status: COMPLETED | OUTPATIENT
Start: 2024-05-07 | End: 2024-05-07

## 2024-03-11 ENCOUNTER — OUTPATIENT (OUTPATIENT)
Dept: OUTPATIENT SERVICES | Facility: HOSPITAL | Age: 34
LOS: 1 days | End: 2024-03-11
Payer: MEDICAID

## 2024-03-11 ENCOUNTER — APPOINTMENT (OUTPATIENT)
Dept: INFUSION THERAPY | Facility: CLINIC | Age: 34
End: 2024-03-11

## 2024-03-11 VITALS
DIASTOLIC BLOOD PRESSURE: 69 MMHG | TEMPERATURE: 98 F | OXYGEN SATURATION: 99 % | SYSTOLIC BLOOD PRESSURE: 109 MMHG | HEART RATE: 90 BPM | RESPIRATION RATE: 16 BRPM

## 2024-03-11 VITALS
RESPIRATION RATE: 17 BRPM | DIASTOLIC BLOOD PRESSURE: 78 MMHG | HEIGHT: 67 IN | SYSTOLIC BLOOD PRESSURE: 116 MMHG | WEIGHT: 199.96 LBS | TEMPERATURE: 99 F | OXYGEN SATURATION: 96 % | HEART RATE: 100 BPM

## 2024-03-11 DIAGNOSIS — K50.90 CROHN'S DISEASE, UNSPECIFIED, WITHOUT COMPLICATIONS: ICD-10-CM

## 2024-03-11 LAB
ALBUMIN SERPL ELPH-MCNC: 4.7 G/DL — SIGNIFICANT CHANGE UP (ref 3.3–5)
ALP SERPL-CCNC: 94 U/L — SIGNIFICANT CHANGE UP (ref 40–120)
ALT FLD-CCNC: 27 U/L — SIGNIFICANT CHANGE UP (ref 10–45)
ANION GAP SERPL CALC-SCNC: 12 MMOL/L — SIGNIFICANT CHANGE UP (ref 5–17)
AST SERPL-CCNC: 22 U/L — SIGNIFICANT CHANGE UP (ref 10–40)
BILIRUB SERPL-MCNC: 0.3 MG/DL — SIGNIFICANT CHANGE UP (ref 0.2–1.2)
BUN SERPL-MCNC: 17 MG/DL — SIGNIFICANT CHANGE UP (ref 7–23)
CALCIUM SERPL-MCNC: 9.6 MG/DL — SIGNIFICANT CHANGE UP (ref 8.4–10.5)
CHLORIDE SERPL-SCNC: 102 MMOL/L — SIGNIFICANT CHANGE UP (ref 96–108)
CO2 SERPL-SCNC: 23 MMOL/L — SIGNIFICANT CHANGE UP (ref 22–31)
CREAT SERPL-MCNC: 0.85 MG/DL — SIGNIFICANT CHANGE UP (ref 0.5–1.3)
CRP SERPL-MCNC: 6.1 MG/L — HIGH (ref 0–4)
EGFR: 93 ML/MIN/1.73M2 — SIGNIFICANT CHANGE UP
GLUCOSE SERPL-MCNC: 100 MG/DL — HIGH (ref 70–99)
HCT VFR BLD CALC: 40.6 % — SIGNIFICANT CHANGE UP (ref 34.5–45)
HGB BLD-MCNC: 13.5 G/DL — SIGNIFICANT CHANGE UP (ref 11.5–15.5)
MCHC RBC-ENTMCNC: 27.6 PG — SIGNIFICANT CHANGE UP (ref 27–34)
MCHC RBC-ENTMCNC: 33.3 GM/DL — SIGNIFICANT CHANGE UP (ref 32–36)
MCV RBC AUTO: 82.9 FL — SIGNIFICANT CHANGE UP (ref 80–100)
NRBC # BLD: 0 /100 WBCS — SIGNIFICANT CHANGE UP (ref 0–0)
PLATELET # BLD AUTO: 603 K/UL — HIGH (ref 150–400)
POTASSIUM SERPL-MCNC: 4.1 MMOL/L — SIGNIFICANT CHANGE UP (ref 3.5–5.3)
POTASSIUM SERPL-SCNC: 4.1 MMOL/L — SIGNIFICANT CHANGE UP (ref 3.5–5.3)
PROT SERPL-MCNC: 7.8 G/DL — SIGNIFICANT CHANGE UP (ref 6–8.3)
RBC # BLD: 4.9 M/UL — SIGNIFICANT CHANGE UP (ref 3.8–5.2)
RBC # FLD: 13.9 % — SIGNIFICANT CHANGE UP (ref 10.3–14.5)
SODIUM SERPL-SCNC: 137 MMOL/L — SIGNIFICANT CHANGE UP (ref 135–145)
WBC # BLD: 10.86 K/UL — HIGH (ref 3.8–10.5)
WBC # FLD AUTO: 10.86 K/UL — HIGH (ref 3.8–10.5)

## 2024-03-11 PROCEDURE — 96413 CHEMO IV INFUSION 1 HR: CPT

## 2024-03-11 PROCEDURE — 86140 C-REACTIVE PROTEIN: CPT

## 2024-03-11 PROCEDURE — 36415 COLL VENOUS BLD VENIPUNCTURE: CPT

## 2024-03-11 PROCEDURE — 85027 COMPLETE CBC AUTOMATED: CPT

## 2024-03-11 PROCEDURE — 80053 COMPREHEN METABOLIC PANEL: CPT

## 2024-03-11 RX ORDER — VEDOLIZUMAB 108 MG/.68ML
300 INJECTION, SOLUTION SUBCUTANEOUS ONCE
Refills: 0 | Status: COMPLETED | OUTPATIENT
Start: 2024-03-11 | End: 2024-03-11

## 2024-03-11 RX ADMIN — VEDOLIZUMAB 300 MILLIGRAM(S): 108 INJECTION, SOLUTION SUBCUTANEOUS at 14:13

## 2024-03-11 RX ADMIN — VEDOLIZUMAB 500 MILLIGRAM(S): 108 INJECTION, SOLUTION SUBCUTANEOUS at 13:43

## 2024-05-07 ENCOUNTER — OUTPATIENT (OUTPATIENT)
Dept: OUTPATIENT SERVICES | Facility: HOSPITAL | Age: 34
LOS: 1 days | End: 2024-05-07
Payer: MEDICAID

## 2024-05-07 ENCOUNTER — APPOINTMENT (OUTPATIENT)
Dept: INFUSION THERAPY | Facility: CLINIC | Age: 34
End: 2024-05-07

## 2024-05-07 VITALS
OXYGEN SATURATION: 98 % | HEART RATE: 95 BPM | WEIGHT: 199.96 LBS | HEIGHT: 67 IN | TEMPERATURE: 98 F | RESPIRATION RATE: 20 BRPM | DIASTOLIC BLOOD PRESSURE: 78 MMHG | SYSTOLIC BLOOD PRESSURE: 114 MMHG

## 2024-05-07 VITALS
SYSTOLIC BLOOD PRESSURE: 110 MMHG | HEART RATE: 92 BPM | TEMPERATURE: 97 F | RESPIRATION RATE: 16 BRPM | DIASTOLIC BLOOD PRESSURE: 73 MMHG | OXYGEN SATURATION: 100 %

## 2024-05-07 DIAGNOSIS — K50.90 CROHN'S DISEASE, UNSPECIFIED, WITHOUT COMPLICATIONS: ICD-10-CM

## 2024-05-07 LAB
ALBUMIN SERPL ELPH-MCNC: 3.8 G/DL — SIGNIFICANT CHANGE UP (ref 3.3–5)
ALP SERPL-CCNC: 76 U/L — SIGNIFICANT CHANGE UP (ref 40–120)
ALT FLD-CCNC: 25 U/L — SIGNIFICANT CHANGE UP (ref 10–45)
ANION GAP SERPL CALC-SCNC: 4 MMOL/L — LOW (ref 5–17)
AST SERPL-CCNC: 27 U/L — SIGNIFICANT CHANGE UP (ref 10–40)
BILIRUB SERPL-MCNC: 0.7 MG/DL — SIGNIFICANT CHANGE UP (ref 0.2–1.2)
BUN SERPL-MCNC: 14 MG/DL — SIGNIFICANT CHANGE UP (ref 7–23)
CALCIUM SERPL-MCNC: 10.3 MG/DL — SIGNIFICANT CHANGE UP (ref 8.4–10.5)
CHLORIDE SERPL-SCNC: 108 MMOL/L — SIGNIFICANT CHANGE UP (ref 96–108)
CO2 SERPL-SCNC: 29 MMOL/L — SIGNIFICANT CHANGE UP (ref 22–31)
CREAT SERPL-MCNC: 0.8 MG/DL — SIGNIFICANT CHANGE UP (ref 0.5–1.3)
CRP SERPL-MCNC: 5.9 MG/L — HIGH (ref 0–4)
EGFR: 99 ML/MIN/1.73M2 — SIGNIFICANT CHANGE UP
GLUCOSE SERPL-MCNC: 119 MG/DL — HIGH (ref 70–99)
HCT VFR BLD CALC: 38 % — SIGNIFICANT CHANGE UP (ref 34.5–45)
HGB BLD-MCNC: 12.8 G/DL — SIGNIFICANT CHANGE UP (ref 11.5–15.5)
LYMPHOCYTES # BLD AUTO: 2.4 K/UL — SIGNIFICANT CHANGE UP (ref 1–3.3)
LYMPHOCYTES # BLD AUTO: 23.1 % — SIGNIFICANT CHANGE UP (ref 13–44)
MCHC RBC-ENTMCNC: 27.7 PG — SIGNIFICANT CHANGE UP (ref 27–34)
MCHC RBC-ENTMCNC: 33.7 GM/DL — SIGNIFICANT CHANGE UP (ref 32–36)
MCV RBC AUTO: 82.3 FL — SIGNIFICANT CHANGE UP (ref 80–100)
NEUTROPHILS # BLD AUTO: 7.3 K/UL — SIGNIFICANT CHANGE UP (ref 1.8–7.4)
NEUTROPHILS NFR BLD AUTO: 71.1 % — SIGNIFICANT CHANGE UP (ref 43–77)
PLATELET # BLD AUTO: 631 K/UL — HIGH (ref 150–400)
POTASSIUM SERPL-MCNC: 4.5 MMOL/L — SIGNIFICANT CHANGE UP (ref 3.5–5.3)
POTASSIUM SERPL-SCNC: 4.5 MMOL/L — SIGNIFICANT CHANGE UP (ref 3.5–5.3)
PROT SERPL-MCNC: 7.3 G/DL — SIGNIFICANT CHANGE UP (ref 6–8.3)
RBC # BLD: 4.62 M/UL — SIGNIFICANT CHANGE UP (ref 3.8–5.2)
RBC # FLD: 14.3 % — SIGNIFICANT CHANGE UP (ref 10.3–14.5)
SODIUM SERPL-SCNC: 141 MMOL/L — SIGNIFICANT CHANGE UP (ref 135–145)
WBC # BLD: 10.3 K/UL — SIGNIFICANT CHANGE UP (ref 3.8–10.5)
WBC # FLD AUTO: 10.3 K/UL — SIGNIFICANT CHANGE UP (ref 3.8–10.5)

## 2024-05-07 PROCEDURE — 80053 COMPREHEN METABOLIC PANEL: CPT

## 2024-05-07 PROCEDURE — 85025 COMPLETE CBC W/AUTO DIFF WBC: CPT

## 2024-05-07 PROCEDURE — 86140 C-REACTIVE PROTEIN: CPT

## 2024-05-07 PROCEDURE — 36415 COLL VENOUS BLD VENIPUNCTURE: CPT

## 2024-05-07 PROCEDURE — 96413 CHEMO IV INFUSION 1 HR: CPT

## 2024-05-07 RX ADMIN — VEDOLIZUMAB 300 MILLIGRAM(S): 108 INJECTION, SOLUTION SUBCUTANEOUS at 14:46

## 2024-05-07 RX ADMIN — VEDOLIZUMAB 500 MILLIGRAM(S): 108 INJECTION, SOLUTION SUBCUTANEOUS at 14:16

## 2024-05-20 ENCOUNTER — APPOINTMENT (OUTPATIENT)
Dept: ENDOCRINOLOGY | Facility: CLINIC | Age: 34
End: 2024-05-20
Payer: MEDICAID

## 2024-05-20 VITALS
HEART RATE: 98 BPM | BODY MASS INDEX: 32.49 KG/M2 | OXYGEN SATURATION: 96 % | SYSTOLIC BLOOD PRESSURE: 140 MMHG | DIASTOLIC BLOOD PRESSURE: 87 MMHG | HEIGHT: 67 IN | WEIGHT: 207 LBS | TEMPERATURE: 97.2 F

## 2024-05-20 DIAGNOSIS — Z13.1 ENCOUNTER FOR SCREENING FOR DIABETES MELLITUS: ICD-10-CM

## 2024-05-20 PROCEDURE — 99214 OFFICE O/P EST MOD 30 MIN: CPT

## 2024-05-20 RX ORDER — PREDNISONE 10 MG/1
10 TABLET ORAL
Qty: 100 | Refills: 0 | Status: DISCONTINUED | COMMUNITY
Start: 2022-06-28 | End: 2024-05-20

## 2024-05-20 NOTE — ASSESSMENT
[FreeTextEntry1] : Patient is a 35 yo woman here for weight follow up  1. Class 1 Obesity/BMI 32 Patient states struggles with weight gain since 2022 after steroids for management of ulcerative colitis. Periods are regular and she feels well. Reports no history of diabetes or thyroid issues (had labs done and will send them over) -on 24 hour food recall at first consultation visit, there was excess calories including alcohol 8 drinks a week along with increased carbohydrates. Encouraged adding protein to meals and we have come up with SMART goal see below. The patient reports lifestyle changes have been difficult due to work and recent travelling -she would like to pursue medical management -the recommended treatment includes GLP 1 agonist. However, she was told that it is not approved during conception/pregnancy/breastfeeding. She would like to do fertility treatment for cryopreservation. I've asked that she proceed with that first and obtain approval from GI doctors.  Once fertility treatment is done and if no GI contraindications, we can start Zepbound.  The patient will be paying out of pocket for it -check TSH, A1c, lipid profile today  -discussed concept of calorie restriction -patient encouraged to continue with trying to eat healthy, meal preparing, decreasing alcohol -side effects of GLP/GIP/Zepbound including nausea/vomiting/pancreatitis were discussed. Patient has no personal/family history of MEN/MTC  Patient will call once she has had fertility and GI consult.  At that point, will start Zepbound

## 2024-05-20 NOTE — REVIEW OF SYSTEMS
[Fatigue] : no fatigue [Decreased Appetite] : appetite not decreased [Dysphagia] : no dysphagia [Dysphonia] : no dysphonia [Chest Pain] : no chest pain [Slow Heart Rate] : heart rate is not slow [Palpitations] : no palpitations [Fast Heart Rate] : heart rate is not fast [Shortness Of Breath] : no shortness of breath [Nausea] : no nausea [Constipation] : no constipation [Vomiting] : no vomiting [Diarrhea] : no diarrhea [Irregular Menses] : regular menses [Depression] : no depression

## 2024-05-20 NOTE — PHYSICAL EXAM
[Alert] : alert [Well Nourished] : well nourished [No Acute Distress] : no acute distress [EOMI] : extra ocular movement intact [Thyroid Not Enlarged] : the thyroid was not enlarged [No Accessory Muscle Use] : no accessory muscle use [Clear to Auscultation] : lungs were clear to auscultation bilaterally [Normal S1, S2] : normal S1 and S2 [Normal Rate] : heart rate was normal [Normal Bowel Sounds] : normal bowel sounds [Not Tender] : non-tender [Soft] : abdomen soft [Normal Gait] : normal gait [Normal Affect] : the affect was normal [Normal Insight/Judgement] : insight and judgment were intact [Normal Mood] : the mood was normal

## 2024-05-20 NOTE — HISTORY OF PRESENT ILLNESS
[FreeTextEntry1] : Patient is a 33 yo woman here for weight management.  Patient was diagnosed with mild Crohn's Disease in early 2010s. Summer 2022, she got very sick and was diagnosed with ulcerative colitis. The patient was treated with prednisone 60 mg for a while then very slowly tapered off.  On steroids, she had significant weight gain. Weight seems to be harder than ever to lose. Patient came for weight consultation October 2023 at which point we developed SMART goal and discussed caloric reductions.   She's been travelling and not home; routine is difficult.  At first, she was trying to track calories but it got really hard. Instead, she tried to have more protein and drink less alcohol.  States she's very binge-y. 24 hour food recall Breakfast: protein shake with powder, berries, banana, yogurt Lunch: often purchases outside food.  Goes to sweet greens, chopt-salad with chicken but often no meat; sandwich, subway veggie delight Dinner: mom cooks; salad on the side and vegetable stir justice Eats out about 2-3 times a week Alcohol: when she drinks (1-2 times per week) will have 1-3 drinks.   Snacks: sometimes chips, lately trying to have clementines, pretzels, string cheese Periods are regular Mother: very strong but considering exercise and low food intake, has a belly and overweight. Siblings are healthy with no overweight

## 2024-06-27 ENCOUNTER — TRANSCRIPTION ENCOUNTER (OUTPATIENT)
Age: 34
End: 2024-06-27

## 2024-06-28 ENCOUNTER — OUTPATIENT (OUTPATIENT)
Dept: OUTPATIENT SERVICES | Facility: HOSPITAL | Age: 34
LOS: 1 days | End: 2024-06-28
Payer: MEDICAID

## 2024-06-28 ENCOUNTER — APPOINTMENT (OUTPATIENT)
Dept: INFUSION THERAPY | Facility: CLINIC | Age: 34
End: 2024-06-28

## 2024-06-28 VITALS
HEART RATE: 110 BPM | TEMPERATURE: 100 F | RESPIRATION RATE: 17 BRPM | WEIGHT: 205.03 LBS | DIASTOLIC BLOOD PRESSURE: 82 MMHG | OXYGEN SATURATION: 97 % | HEIGHT: 67 IN | SYSTOLIC BLOOD PRESSURE: 114 MMHG

## 2024-06-28 DIAGNOSIS — K51.90 ULCERATIVE COLITIS, UNSPECIFIED, WITHOUT COMPLICATIONS: ICD-10-CM

## 2024-06-28 LAB
ALBUMIN SERPL ELPH-MCNC: 4.7 G/DL — SIGNIFICANT CHANGE UP (ref 3.3–5)
ALP SERPL-CCNC: 107 U/L — SIGNIFICANT CHANGE UP (ref 40–120)
ALT FLD-CCNC: 12 U/L — SIGNIFICANT CHANGE UP (ref 10–45)
ANION GAP SERPL CALC-SCNC: 9 MMOL/L — SIGNIFICANT CHANGE UP (ref 5–17)
AST SERPL-CCNC: 16 U/L — SIGNIFICANT CHANGE UP (ref 10–40)
BILIRUB SERPL-MCNC: 0.3 MG/DL — SIGNIFICANT CHANGE UP (ref 0.2–1.2)
BUN SERPL-MCNC: 16 MG/DL — SIGNIFICANT CHANGE UP (ref 7–23)
CALCIUM SERPL-MCNC: 10.2 MG/DL — SIGNIFICANT CHANGE UP (ref 8.4–10.5)
CHLORIDE SERPL-SCNC: 104 MMOL/L — SIGNIFICANT CHANGE UP (ref 96–108)
CO2 SERPL-SCNC: 27 MMOL/L — SIGNIFICANT CHANGE UP (ref 22–31)
CREAT SERPL-MCNC: 0.89 MG/DL — SIGNIFICANT CHANGE UP (ref 0.5–1.3)
CRP SERPL-MCNC: 7.8 MG/L — HIGH (ref 0–4)
EGFR: 87 ML/MIN/1.73M2 — SIGNIFICANT CHANGE UP
GLUCOSE SERPL-MCNC: 104 MG/DL — HIGH (ref 70–99)
HCT VFR BLD CALC: 43.5 % — SIGNIFICANT CHANGE UP (ref 34.5–45)
HGB BLD-MCNC: 13.9 G/DL — SIGNIFICANT CHANGE UP (ref 11.5–15.5)
MCHC RBC-ENTMCNC: 26.8 PG — LOW (ref 27–34)
MCHC RBC-ENTMCNC: 32 GM/DL — SIGNIFICANT CHANGE UP (ref 32–36)
MCV RBC AUTO: 83.8 FL — SIGNIFICANT CHANGE UP (ref 80–100)
NRBC # BLD: 0 /100 WBCS — SIGNIFICANT CHANGE UP (ref 0–0)
PLATELET # BLD AUTO: 686 K/UL — HIGH (ref 150–400)
POTASSIUM SERPL-MCNC: 3.8 MMOL/L — SIGNIFICANT CHANGE UP (ref 3.5–5.3)
POTASSIUM SERPL-SCNC: 3.8 MMOL/L — SIGNIFICANT CHANGE UP (ref 3.5–5.3)
PROT SERPL-MCNC: 8.1 G/DL — SIGNIFICANT CHANGE UP (ref 6–8.3)
RBC # BLD: 5.19 M/UL — SIGNIFICANT CHANGE UP (ref 3.8–5.2)
RBC # FLD: 13.5 % — SIGNIFICANT CHANGE UP (ref 10.3–14.5)
SODIUM SERPL-SCNC: 140 MMOL/L — SIGNIFICANT CHANGE UP (ref 135–145)
WBC # BLD: 10.83 K/UL — HIGH (ref 3.8–10.5)
WBC # FLD AUTO: 10.83 K/UL — HIGH (ref 3.8–10.5)

## 2024-06-28 PROCEDURE — 80053 COMPREHEN METABOLIC PANEL: CPT

## 2024-06-28 PROCEDURE — 36415 COLL VENOUS BLD VENIPUNCTURE: CPT

## 2024-06-28 PROCEDURE — 86140 C-REACTIVE PROTEIN: CPT

## 2024-06-28 PROCEDURE — 96413 CHEMO IV INFUSION 1 HR: CPT

## 2024-06-28 PROCEDURE — 85027 COMPLETE CBC AUTOMATED: CPT

## 2024-06-28 RX ORDER — VEDOLIZUMAB 108 MG/.68ML
300 INJECTION, SOLUTION SUBCUTANEOUS ONCE
Refills: 0 | Status: COMPLETED | OUTPATIENT
Start: 2024-06-28 | End: 2024-06-28

## 2024-06-28 RX ADMIN — VEDOLIZUMAB 300 MILLIGRAM(S): 108 INJECTION, SOLUTION SUBCUTANEOUS at 17:30

## 2024-06-28 RX ADMIN — VEDOLIZUMAB 500 MILLIGRAM(S): 108 INJECTION, SOLUTION SUBCUTANEOUS at 17:00

## 2024-07-24 ENCOUNTER — TRANSCRIPTION ENCOUNTER (OUTPATIENT)
Age: 34
End: 2024-07-24

## 2024-07-25 ENCOUNTER — TRANSCRIPTION ENCOUNTER (OUTPATIENT)
Age: 34
End: 2024-07-25

## 2024-07-28 PROBLEM — K64.9 HEMORRHOIDS: Status: ACTIVE | Noted: 2024-07-28

## 2024-07-29 ENCOUNTER — TRANSCRIPTION ENCOUNTER (OUTPATIENT)
Age: 34
End: 2024-07-29

## 2024-08-19 ENCOUNTER — APPOINTMENT (OUTPATIENT)
Dept: INFUSION THERAPY | Facility: CLINIC | Age: 34
End: 2024-08-19

## 2024-08-19 ENCOUNTER — OUTPATIENT (OUTPATIENT)
Dept: OUTPATIENT SERVICES | Facility: HOSPITAL | Age: 34
LOS: 1 days | End: 2024-08-19
Payer: MEDICAID

## 2024-08-19 VITALS
HEART RATE: 100 BPM | WEIGHT: 197.98 LBS | OXYGEN SATURATION: 97 % | SYSTOLIC BLOOD PRESSURE: 122 MMHG | RESPIRATION RATE: 18 BRPM | TEMPERATURE: 99 F | HEIGHT: 67 IN | DIASTOLIC BLOOD PRESSURE: 71 MMHG

## 2024-08-19 VITALS
HEART RATE: 84 BPM | SYSTOLIC BLOOD PRESSURE: 112 MMHG | RESPIRATION RATE: 18 BRPM | TEMPERATURE: 98 F | OXYGEN SATURATION: 99 % | DIASTOLIC BLOOD PRESSURE: 70 MMHG

## 2024-08-19 DIAGNOSIS — K51.90 ULCERATIVE COLITIS, UNSPECIFIED, WITHOUT COMPLICATIONS: ICD-10-CM

## 2024-08-19 LAB
ALBUMIN SERPL ELPH-MCNC: 4.6 G/DL — SIGNIFICANT CHANGE UP (ref 3.3–5)
ALP SERPL-CCNC: 91 U/L — SIGNIFICANT CHANGE UP (ref 40–120)
ALT FLD-CCNC: 19 U/L — SIGNIFICANT CHANGE UP (ref 10–45)
ANION GAP SERPL CALC-SCNC: 10 MMOL/L — SIGNIFICANT CHANGE UP (ref 5–17)
AST SERPL-CCNC: 21 U/L — SIGNIFICANT CHANGE UP (ref 10–40)
BILIRUB SERPL-MCNC: 0.4 MG/DL — SIGNIFICANT CHANGE UP (ref 0.2–1.2)
BUN SERPL-MCNC: 12 MG/DL — SIGNIFICANT CHANGE UP (ref 7–23)
CALCIUM SERPL-MCNC: 9.4 MG/DL — SIGNIFICANT CHANGE UP (ref 8.4–10.5)
CHLORIDE SERPL-SCNC: 107 MMOL/L — SIGNIFICANT CHANGE UP (ref 96–108)
CO2 SERPL-SCNC: 25 MMOL/L — SIGNIFICANT CHANGE UP (ref 22–31)
CREAT SERPL-MCNC: 0.85 MG/DL — SIGNIFICANT CHANGE UP (ref 0.5–1.3)
CRP SERPL-MCNC: 5.6 MG/L — HIGH (ref 0–4)
EGFR: 92 ML/MIN/1.73M2 — SIGNIFICANT CHANGE UP
GLUCOSE SERPL-MCNC: 106 MG/DL — HIGH (ref 70–99)
HCT VFR BLD CALC: 40.9 % — SIGNIFICANT CHANGE UP (ref 34.5–45)
HGB BLD-MCNC: 13.3 G/DL — SIGNIFICANT CHANGE UP (ref 11.5–15.5)
MCHC RBC-ENTMCNC: 26.4 PG — LOW (ref 27–34)
MCHC RBC-ENTMCNC: 32.5 GM/DL — SIGNIFICANT CHANGE UP (ref 32–36)
MCV RBC AUTO: 81.3 FL — SIGNIFICANT CHANGE UP (ref 80–100)
NRBC # BLD: 0 /100 WBCS — SIGNIFICANT CHANGE UP (ref 0–0)
PLATELET # BLD AUTO: 585 K/UL — HIGH (ref 150–400)
POTASSIUM SERPL-MCNC: 3.9 MMOL/L — SIGNIFICANT CHANGE UP (ref 3.5–5.3)
POTASSIUM SERPL-SCNC: 3.9 MMOL/L — SIGNIFICANT CHANGE UP (ref 3.5–5.3)
PROT SERPL-MCNC: 7.7 G/DL — SIGNIFICANT CHANGE UP (ref 6–8.3)
RBC # BLD: 5.03 M/UL — SIGNIFICANT CHANGE UP (ref 3.8–5.2)
RBC # FLD: 14.2 % — SIGNIFICANT CHANGE UP (ref 10.3–14.5)
SODIUM SERPL-SCNC: 142 MMOL/L — SIGNIFICANT CHANGE UP (ref 135–145)
WBC # BLD: 11.74 K/UL — HIGH (ref 3.8–10.5)
WBC # FLD AUTO: 11.74 K/UL — HIGH (ref 3.8–10.5)

## 2024-08-19 PROCEDURE — 85027 COMPLETE CBC AUTOMATED: CPT

## 2024-08-19 PROCEDURE — 80053 COMPREHEN METABOLIC PANEL: CPT

## 2024-08-19 PROCEDURE — 96413 CHEMO IV INFUSION 1 HR: CPT

## 2024-08-19 PROCEDURE — 86140 C-REACTIVE PROTEIN: CPT

## 2024-08-19 PROCEDURE — 36415 COLL VENOUS BLD VENIPUNCTURE: CPT

## 2024-08-19 RX ORDER — VEDOLIZUMAB 300 MG/5ML
300 INJECTION, POWDER, LYOPHILIZED, FOR SOLUTION INTRAVENOUS ONCE
Refills: 0 | Status: COMPLETED | OUTPATIENT
Start: 2024-08-19 | End: 2024-08-19

## 2024-08-19 RX ADMIN — VEDOLIZUMAB 500 MILLIGRAM(S): 300 INJECTION, POWDER, LYOPHILIZED, FOR SOLUTION INTRAVENOUS at 13:45

## 2024-08-19 RX ADMIN — VEDOLIZUMAB 300 MILLIGRAM(S): 300 INJECTION, POWDER, LYOPHILIZED, FOR SOLUTION INTRAVENOUS at 14:15

## 2024-09-03 ENCOUNTER — APPOINTMENT (OUTPATIENT)
Dept: INFUSION THERAPY | Facility: CLINIC | Age: 34
End: 2024-09-03

## 2024-09-03 ENCOUNTER — TRANSCRIPTION ENCOUNTER (OUTPATIENT)
Age: 34
End: 2024-09-03

## 2024-09-12 ENCOUNTER — APPOINTMENT (OUTPATIENT)
Dept: ENDOCRINOLOGY | Facility: CLINIC | Age: 34
End: 2024-09-12

## 2024-10-01 ENCOUNTER — TRANSCRIPTION ENCOUNTER (OUTPATIENT)
Age: 34
End: 2024-10-01

## 2024-10-14 ENCOUNTER — APPOINTMENT (OUTPATIENT)
Dept: INFUSION THERAPY | Facility: CLINIC | Age: 34
End: 2024-10-14

## 2024-10-14 ENCOUNTER — OUTPATIENT (OUTPATIENT)
Dept: OUTPATIENT SERVICES | Facility: HOSPITAL | Age: 34
LOS: 1 days | End: 2024-10-14
Payer: MEDICAID

## 2024-10-14 VITALS
HEART RATE: 80 BPM | DIASTOLIC BLOOD PRESSURE: 78 MMHG | WEIGHT: 199.96 LBS | TEMPERATURE: 98 F | RESPIRATION RATE: 18 BRPM | SYSTOLIC BLOOD PRESSURE: 118 MMHG | OXYGEN SATURATION: 97 % | HEIGHT: 67 IN

## 2024-10-14 DIAGNOSIS — K51.90 ULCERATIVE COLITIS, UNSPECIFIED, WITHOUT COMPLICATIONS: ICD-10-CM

## 2024-10-14 PROCEDURE — 96413 CHEMO IV INFUSION 1 HR: CPT

## 2024-10-14 PROCEDURE — 86140 C-REACTIVE PROTEIN: CPT

## 2024-10-14 PROCEDURE — 36415 COLL VENOUS BLD VENIPUNCTURE: CPT

## 2024-10-14 PROCEDURE — 85027 COMPLETE CBC AUTOMATED: CPT

## 2024-10-14 PROCEDURE — 80053 COMPREHEN METABOLIC PANEL: CPT

## 2024-10-14 RX ORDER — VEDOLIZUMAB 300 MG/5ML
300 INJECTION, POWDER, LYOPHILIZED, FOR SOLUTION INTRAVENOUS ONCE
Refills: 0 | Status: COMPLETED | OUTPATIENT
Start: 2024-10-14 | End: 2024-10-14

## 2024-10-14 RX ADMIN — VEDOLIZUMAB 300 MILLIGRAM(S): 300 INJECTION, POWDER, LYOPHILIZED, FOR SOLUTION INTRAVENOUS at 18:05

## 2024-10-14 RX ADMIN — VEDOLIZUMAB 500 MILLIGRAM(S): 300 INJECTION, POWDER, LYOPHILIZED, FOR SOLUTION INTRAVENOUS at 17:35

## 2024-10-15 LAB
ALBUMIN SERPL ELPH-MCNC: 4.4 G/DL — SIGNIFICANT CHANGE UP (ref 3.3–5)
ALP SERPL-CCNC: 93 U/L — SIGNIFICANT CHANGE UP (ref 40–120)
ALT FLD-CCNC: 17 U/L — SIGNIFICANT CHANGE UP (ref 10–45)
ANION GAP SERPL CALC-SCNC: 16 MMOL/L — SIGNIFICANT CHANGE UP (ref 5–17)
AST SERPL-CCNC: 22 U/L — SIGNIFICANT CHANGE UP (ref 10–40)
BILIRUB SERPL-MCNC: 0.2 MG/DL — SIGNIFICANT CHANGE UP (ref 0.2–1.2)
BUN SERPL-MCNC: 17 MG/DL — SIGNIFICANT CHANGE UP (ref 7–23)
CALCIUM SERPL-MCNC: 9.2 MG/DL — SIGNIFICANT CHANGE UP (ref 8.4–10.5)
CHLORIDE SERPL-SCNC: 101 MMOL/L — SIGNIFICANT CHANGE UP (ref 96–108)
CO2 SERPL-SCNC: 25 MMOL/L — SIGNIFICANT CHANGE UP (ref 22–31)
CREAT SERPL-MCNC: 0.88 MG/DL — SIGNIFICANT CHANGE UP (ref 0.5–1.3)
CRP SERPL-MCNC: 5 MG/L — HIGH
EGFR: 88 ML/MIN/1.73M2 — SIGNIFICANT CHANGE UP
GLUCOSE SERPL-MCNC: 83 MG/DL — SIGNIFICANT CHANGE UP (ref 70–99)
HCT VFR BLD CALC: 40.9 % — SIGNIFICANT CHANGE UP (ref 34.5–45)
HGB BLD-MCNC: 13.2 G/DL — SIGNIFICANT CHANGE UP (ref 11.5–15.5)
MCHC RBC-ENTMCNC: 27.4 PG — SIGNIFICANT CHANGE UP (ref 27–34)
MCHC RBC-ENTMCNC: 32.3 GM/DL — SIGNIFICANT CHANGE UP (ref 32–36)
MCV RBC AUTO: 85 FL — SIGNIFICANT CHANGE UP (ref 80–100)
PLATELET # BLD AUTO: 675 K/UL — HIGH (ref 150–400)
POTASSIUM SERPL-MCNC: 3.8 MMOL/L — SIGNIFICANT CHANGE UP (ref 3.5–5.3)
POTASSIUM SERPL-SCNC: 3.8 MMOL/L — SIGNIFICANT CHANGE UP (ref 3.5–5.3)
PROT SERPL-MCNC: 7.7 G/DL — SIGNIFICANT CHANGE UP (ref 6–8.3)
RBC # BLD: 4.81 M/UL — SIGNIFICANT CHANGE UP (ref 3.8–5.2)
RBC # FLD: 14.6 % — HIGH (ref 10.3–14.5)
SODIUM SERPL-SCNC: 142 MMOL/L — SIGNIFICANT CHANGE UP (ref 135–145)
WBC # BLD: 11.6 K/UL — HIGH (ref 3.8–10.5)
WBC # FLD AUTO: 11.6 K/UL — HIGH (ref 3.8–10.5)

## 2024-12-11 ENCOUNTER — APPOINTMENT (OUTPATIENT)
Dept: INFUSION THERAPY | Facility: CLINIC | Age: 34
End: 2024-12-11

## 2024-12-11 ENCOUNTER — OUTPATIENT (OUTPATIENT)
Dept: OUTPATIENT SERVICES | Facility: HOSPITAL | Age: 34
LOS: 1 days | End: 2024-12-11
Payer: MEDICAID

## 2024-12-11 VITALS
DIASTOLIC BLOOD PRESSURE: 70 MMHG | SYSTOLIC BLOOD PRESSURE: 109 MMHG | TEMPERATURE: 98 F | OXYGEN SATURATION: 96 % | HEART RATE: 78 BPM | RESPIRATION RATE: 18 BRPM

## 2024-12-11 VITALS
RESPIRATION RATE: 20 BRPM | WEIGHT: 199.96 LBS | HEIGHT: 67 IN | TEMPERATURE: 99 F | OXYGEN SATURATION: 98 % | HEART RATE: 87 BPM | SYSTOLIC BLOOD PRESSURE: 116 MMHG | DIASTOLIC BLOOD PRESSURE: 81 MMHG

## 2024-12-11 DIAGNOSIS — K51.90 ULCERATIVE COLITIS, UNSPECIFIED, WITHOUT COMPLICATIONS: ICD-10-CM

## 2024-12-11 LAB
ALBUMIN SERPL ELPH-MCNC: 4.6 G/DL — SIGNIFICANT CHANGE UP (ref 3.3–5)
ALP SERPL-CCNC: 90 U/L — SIGNIFICANT CHANGE UP (ref 40–120)
ALT FLD-CCNC: 17 U/L — SIGNIFICANT CHANGE UP (ref 10–45)
ANION GAP SERPL CALC-SCNC: 10 MMOL/L — SIGNIFICANT CHANGE UP (ref 5–17)
AST SERPL-CCNC: 22 U/L — SIGNIFICANT CHANGE UP (ref 10–40)
BILIRUB SERPL-MCNC: 0.3 MG/DL — SIGNIFICANT CHANGE UP (ref 0.2–1.2)
BUN SERPL-MCNC: 22 MG/DL — SIGNIFICANT CHANGE UP (ref 7–23)
CALCIUM SERPL-MCNC: 9.4 MG/DL — SIGNIFICANT CHANGE UP (ref 8.4–10.5)
CHLORIDE SERPL-SCNC: 101 MMOL/L — SIGNIFICANT CHANGE UP (ref 96–108)
CO2 SERPL-SCNC: 29 MMOL/L — SIGNIFICANT CHANGE UP (ref 22–31)
CREAT SERPL-MCNC: 0.82 MG/DL — SIGNIFICANT CHANGE UP (ref 0.5–1.3)
CRP SERPL-MCNC: 5.6 MG/L — HIGH (ref 0–4)
EGFR: 96 ML/MIN/1.73M2 — SIGNIFICANT CHANGE UP
GLUCOSE SERPL-MCNC: 98 MG/DL — SIGNIFICANT CHANGE UP (ref 70–99)
HCT VFR BLD CALC: 39.5 % — SIGNIFICANT CHANGE UP (ref 34.5–45)
HGB BLD-MCNC: 12.5 G/DL — SIGNIFICANT CHANGE UP (ref 11.5–15.5)
MCHC RBC-ENTMCNC: 26.6 PG — LOW (ref 27–34)
MCHC RBC-ENTMCNC: 31.6 G/DL — LOW (ref 32–36)
MCV RBC AUTO: 84 FL — SIGNIFICANT CHANGE UP (ref 80–100)
NRBC # BLD: 0 /100 WBCS — SIGNIFICANT CHANGE UP (ref 0–0)
PLATELET # BLD AUTO: 702 K/UL — HIGH (ref 150–400)
POTASSIUM SERPL-MCNC: 3.8 MMOL/L — SIGNIFICANT CHANGE UP (ref 3.5–5.3)
POTASSIUM SERPL-SCNC: 3.8 MMOL/L — SIGNIFICANT CHANGE UP (ref 3.5–5.3)
PROT SERPL-MCNC: 7.7 G/DL — SIGNIFICANT CHANGE UP (ref 6–8.3)
RBC # BLD: 4.7 M/UL — SIGNIFICANT CHANGE UP (ref 3.8–5.2)
RBC # FLD: 13.9 % — SIGNIFICANT CHANGE UP (ref 10.3–14.5)
SODIUM SERPL-SCNC: 140 MMOL/L — SIGNIFICANT CHANGE UP (ref 135–145)
WBC # BLD: 11.17 K/UL — HIGH (ref 3.8–10.5)
WBC # FLD AUTO: 11.17 K/UL — HIGH (ref 3.8–10.5)

## 2024-12-11 PROCEDURE — 80053 COMPREHEN METABOLIC PANEL: CPT

## 2024-12-11 PROCEDURE — 96413 CHEMO IV INFUSION 1 HR: CPT

## 2024-12-11 PROCEDURE — 86140 C-REACTIVE PROTEIN: CPT

## 2024-12-11 PROCEDURE — 85027 COMPLETE CBC AUTOMATED: CPT

## 2024-12-11 PROCEDURE — 36415 COLL VENOUS BLD VENIPUNCTURE: CPT

## 2024-12-11 RX ORDER — VEDOLIZUMAB 300 MG/5ML
300 INJECTION, POWDER, LYOPHILIZED, FOR SOLUTION INTRAVENOUS ONCE
Refills: 0 | Status: COMPLETED | OUTPATIENT
Start: 2024-12-11 | End: 2024-12-11

## 2024-12-11 RX ADMIN — VEDOLIZUMAB 500 MILLIGRAM(S): 300 INJECTION, POWDER, LYOPHILIZED, FOR SOLUTION INTRAVENOUS at 15:17

## 2024-12-11 RX ADMIN — VEDOLIZUMAB 300 MILLIGRAM(S): 300 INJECTION, POWDER, LYOPHILIZED, FOR SOLUTION INTRAVENOUS at 15:50

## 2025-02-07 ENCOUNTER — APPOINTMENT (OUTPATIENT)
Dept: INFUSION THERAPY | Facility: CLINIC | Age: 35
End: 2025-02-07

## 2025-02-07 ENCOUNTER — OUTPATIENT (OUTPATIENT)
Dept: OUTPATIENT SERVICES | Facility: HOSPITAL | Age: 35
LOS: 1 days | End: 2025-02-07
Payer: MEDICAID

## 2025-02-07 ENCOUNTER — APPOINTMENT (OUTPATIENT)
Dept: OTOLARYNGOLOGY | Facility: CLINIC | Age: 35
End: 2025-02-07
Payer: MEDICAID

## 2025-02-07 ENCOUNTER — NON-APPOINTMENT (OUTPATIENT)
Age: 35
End: 2025-02-07

## 2025-02-07 ENCOUNTER — TRANSCRIPTION ENCOUNTER (OUTPATIENT)
Age: 35
End: 2025-02-07

## 2025-02-07 VITALS
WEIGHT: 210.1 LBS | OXYGEN SATURATION: 97 % | HEIGHT: 68 IN | SYSTOLIC BLOOD PRESSURE: 111 MMHG | DIASTOLIC BLOOD PRESSURE: 65 MMHG | HEART RATE: 90 BPM | RESPIRATION RATE: 17 BRPM | TEMPERATURE: 98 F

## 2025-02-07 VITALS
SYSTOLIC BLOOD PRESSURE: 121 MMHG | DIASTOLIC BLOOD PRESSURE: 81 MMHG | HEIGHT: 67 IN | HEART RATE: 61 BPM | WEIGHT: 200 LBS | BODY MASS INDEX: 31.39 KG/M2

## 2025-02-07 DIAGNOSIS — K51.90 ULCERATIVE COLITIS, UNSPECIFIED, WITHOUT COMPLICATIONS: ICD-10-CM

## 2025-02-07 DIAGNOSIS — H92.01 OTALGIA, RIGHT EAR: ICD-10-CM

## 2025-02-07 DIAGNOSIS — M26.609 UNSPECIFIED TEMPOROMANDIBULAR JOINT DISORDER: ICD-10-CM

## 2025-02-07 DIAGNOSIS — R09.A2 FOREIGN BODY SENSATION, THROAT: ICD-10-CM

## 2025-02-07 LAB
ALBUMIN SERPL ELPH-MCNC: 4.3 G/DL — SIGNIFICANT CHANGE UP (ref 3.3–5)
ALP SERPL-CCNC: 91 U/L — SIGNIFICANT CHANGE UP (ref 40–120)
ALT FLD-CCNC: 23 U/L — SIGNIFICANT CHANGE UP (ref 10–45)
ANION GAP SERPL CALC-SCNC: 10 MMOL/L — SIGNIFICANT CHANGE UP (ref 5–17)
AST SERPL-CCNC: 22 U/L — SIGNIFICANT CHANGE UP (ref 10–40)
BILIRUB SERPL-MCNC: 0.3 MG/DL — SIGNIFICANT CHANGE UP (ref 0.2–1.2)
BUN SERPL-MCNC: 16 MG/DL — SIGNIFICANT CHANGE UP (ref 7–23)
CALCIUM SERPL-MCNC: 9.3 MG/DL — SIGNIFICANT CHANGE UP (ref 8.4–10.5)
CHLORIDE SERPL-SCNC: 100 MMOL/L — SIGNIFICANT CHANGE UP (ref 96–108)
CO2 SERPL-SCNC: 28 MMOL/L — SIGNIFICANT CHANGE UP (ref 22–31)
CREAT SERPL-MCNC: 0.8 MG/DL — SIGNIFICANT CHANGE UP (ref 0.5–1.3)
CRP SERPL-MCNC: 6.4 MG/L — HIGH (ref 0–4)
EGFR: 99 ML/MIN/1.73M2 — SIGNIFICANT CHANGE UP
GLUCOSE SERPL-MCNC: 106 MG/DL — HIGH (ref 70–99)
HCT VFR BLD CALC: 37.4 % — SIGNIFICANT CHANGE UP (ref 34.5–45)
HGB BLD-MCNC: 12.3 G/DL — SIGNIFICANT CHANGE UP (ref 11.5–15.5)
MCHC RBC-ENTMCNC: 27.6 PG — SIGNIFICANT CHANGE UP (ref 27–34)
MCHC RBC-ENTMCNC: 32.9 G/DL — SIGNIFICANT CHANGE UP (ref 32–36)
MCV RBC AUTO: 83.9 FL — SIGNIFICANT CHANGE UP (ref 80–100)
NRBC # BLD: 0 /100 WBCS — SIGNIFICANT CHANGE UP (ref 0–0)
NRBC BLD-RTO: 0 /100 WBCS — SIGNIFICANT CHANGE UP (ref 0–0)
PLATELET # BLD AUTO: 675 K/UL — HIGH (ref 150–400)
POTASSIUM SERPL-MCNC: 3.7 MMOL/L — SIGNIFICANT CHANGE UP (ref 3.5–5.3)
POTASSIUM SERPL-SCNC: 3.7 MMOL/L — SIGNIFICANT CHANGE UP (ref 3.5–5.3)
PROT SERPL-MCNC: 7.3 G/DL — SIGNIFICANT CHANGE UP (ref 6–8.3)
RBC # BLD: 4.46 M/UL — SIGNIFICANT CHANGE UP (ref 3.8–5.2)
RBC # FLD: 14.3 % — SIGNIFICANT CHANGE UP (ref 10.3–14.5)
SODIUM SERPL-SCNC: 138 MMOL/L — SIGNIFICANT CHANGE UP (ref 135–145)
WBC # BLD: 9.93 K/UL — SIGNIFICANT CHANGE UP (ref 3.8–10.5)
WBC # FLD AUTO: 9.93 K/UL — SIGNIFICANT CHANGE UP (ref 3.8–10.5)

## 2025-02-07 PROCEDURE — 99204 OFFICE O/P NEW MOD 45 MIN: CPT | Mod: 25

## 2025-02-07 PROCEDURE — 85027 COMPLETE CBC AUTOMATED: CPT

## 2025-02-07 PROCEDURE — 96413 CHEMO IV INFUSION 1 HR: CPT

## 2025-02-07 PROCEDURE — 36415 COLL VENOUS BLD VENIPUNCTURE: CPT

## 2025-02-07 PROCEDURE — 80053 COMPREHEN METABOLIC PANEL: CPT

## 2025-02-07 PROCEDURE — 92504 EAR MICROSCOPY EXAMINATION: CPT

## 2025-02-07 PROCEDURE — 92511 NASOPHARYNGOSCOPY: CPT

## 2025-02-07 PROCEDURE — 86140 C-REACTIVE PROTEIN: CPT

## 2025-02-07 RX ORDER — VEDOLIZUMAB 108 MG/.68ML
300 INJECTION, SOLUTION SUBCUTANEOUS ONCE
Refills: 0 | Status: COMPLETED | OUTPATIENT
Start: 2025-02-07 | End: 2025-02-07

## 2025-02-07 RX ORDER — IBUPROFEN 600 MG/1
600 TABLET, FILM COATED ORAL 3 TIMES DAILY
Qty: 30 | Refills: 0 | Status: ACTIVE | COMMUNITY
Start: 2025-02-07 | End: 1900-01-01

## 2025-02-07 RX ADMIN — VEDOLIZUMAB 300 MILLIGRAM(S): 108 INJECTION, SOLUTION SUBCUTANEOUS at 15:55

## 2025-02-07 RX ADMIN — VEDOLIZUMAB 500 MILLIGRAM(S): 108 INJECTION, SOLUTION SUBCUTANEOUS at 15:22

## 2025-02-10 ENCOUNTER — TRANSCRIPTION ENCOUNTER (OUTPATIENT)
Age: 35
End: 2025-02-10

## 2025-02-19 ENCOUNTER — APPOINTMENT (OUTPATIENT)
Dept: GASTROENTEROLOGY | Facility: CLINIC | Age: 35
End: 2025-02-19
Payer: MEDICAID

## 2025-02-19 DIAGNOSIS — K52.3 INDETERMINATE COLITIS: ICD-10-CM

## 2025-02-19 DIAGNOSIS — D75.839 THROMBOCYTOSIS, UNSPECIFIED: ICD-10-CM

## 2025-02-19 DIAGNOSIS — D84.9 IMMUNODEFICIENCY, UNSPECIFIED: ICD-10-CM

## 2025-02-19 PROCEDURE — 99215 OFFICE O/P EST HI 40 MIN: CPT | Mod: 95

## 2025-02-24 RX ORDER — VEDOLIZUMAB 108 MG/.68ML
108 INJECTION, SOLUTION SUBCUTANEOUS
Qty: 2 | Refills: 3 | Status: ACTIVE | COMMUNITY
Start: 2025-02-24 | End: 1900-01-01

## 2025-03-13 ENCOUNTER — TRANSCRIPTION ENCOUNTER (OUTPATIENT)
Age: 35
End: 2025-03-13

## 2025-04-02 ENCOUNTER — APPOINTMENT (OUTPATIENT)
Dept: INFUSION THERAPY | Facility: CLINIC | Age: 35
End: 2025-04-02

## 2025-05-28 ENCOUNTER — APPOINTMENT (OUTPATIENT)
Dept: INFUSION THERAPY | Facility: CLINIC | Age: 35
End: 2025-05-28

## 2025-08-01 ENCOUNTER — TRANSCRIPTION ENCOUNTER (OUTPATIENT)
Age: 35
End: 2025-08-01

## 2025-08-06 ENCOUNTER — APPOINTMENT (OUTPATIENT)
Dept: GASTROENTEROLOGY | Facility: CLINIC | Age: 35
End: 2025-08-06

## 2025-08-06 PROCEDURE — 99214 OFFICE O/P EST MOD 30 MIN: CPT | Mod: 95

## 2025-09-12 ENCOUNTER — TRANSCRIPTION ENCOUNTER (OUTPATIENT)
Age: 35
End: 2025-09-12

## 2025-09-17 ENCOUNTER — APPOINTMENT (OUTPATIENT)
Dept: GASTROENTEROLOGY | Facility: CLINIC | Age: 35
End: 2025-09-17
Payer: MEDICAID

## 2025-09-17 VITALS
HEIGHT: 67 IN | OXYGEN SATURATION: 99 % | DIASTOLIC BLOOD PRESSURE: 72 MMHG | TEMPERATURE: 97.9 F | WEIGHT: 213 LBS | SYSTOLIC BLOOD PRESSURE: 112 MMHG | RESPIRATION RATE: 16 BRPM | BODY MASS INDEX: 33.43 KG/M2 | HEART RATE: 84 BPM

## 2025-09-17 PROCEDURE — 99214 OFFICE O/P EST MOD 30 MIN: CPT

## 2025-09-18 LAB — CRP SERPL-MCNC: 4 MG/L
